# Patient Record
Sex: MALE | Race: WHITE | NOT HISPANIC OR LATINO | Employment: OTHER | URBAN - METROPOLITAN AREA
[De-identification: names, ages, dates, MRNs, and addresses within clinical notes are randomized per-mention and may not be internally consistent; named-entity substitution may affect disease eponyms.]

---

## 2017-01-25 ENCOUNTER — TELEPHONE (OUTPATIENT)
Dept: UROLOGY | Facility: CLINIC | Age: 67
End: 2017-01-25

## 2017-01-30 ENCOUNTER — OFFICE VISIT (OUTPATIENT)
Dept: UROLOGY | Facility: CLINIC | Age: 67
End: 2017-01-30
Payer: MEDICARE

## 2017-01-30 VITALS
BODY MASS INDEX: 40.73 KG/M2 | DIASTOLIC BLOOD PRESSURE: 77 MMHG | WEIGHT: 275 LBS | SYSTOLIC BLOOD PRESSURE: 140 MMHG | HEIGHT: 69 IN | HEART RATE: 61 BPM | TEMPERATURE: 98 F

## 2017-01-30 DIAGNOSIS — R35.81 NOCTURNAL POLYURIA: Primary | ICD-10-CM

## 2017-01-30 PROCEDURE — 81002 URINALYSIS NONAUTO W/O SCOPE: CPT | Mod: PBBFAC,PO | Performed by: UROLOGY

## 2017-01-30 PROCEDURE — 99213 OFFICE O/P EST LOW 20 MIN: CPT | Mod: S$PBB,,, | Performed by: UROLOGY

## 2017-01-30 PROCEDURE — 99999 PR PBB SHADOW E&M-EST. PATIENT-LVL III: CPT | Mod: PBBFAC,,, | Performed by: UROLOGY

## 2017-01-30 PROCEDURE — 99213 OFFICE O/P EST LOW 20 MIN: CPT | Mod: PBBFAC,PO | Performed by: UROLOGY

## 2017-01-30 RX ORDER — CARVEDILOL 12.5 MG/1
12.5 TABLET ORAL 2 TIMES DAILY
COMMUNITY
End: 2018-09-24 | Stop reason: SDUPTHER

## 2017-01-30 RX ORDER — FUROSEMIDE 40 MG/1
40 TABLET ORAL 2 TIMES DAILY
COMMUNITY
End: 2018-06-04

## 2017-01-30 RX ORDER — AMLODIPINE BESYLATE 5 MG/1
5 TABLET ORAL DAILY
COMMUNITY
End: 2018-06-04 | Stop reason: SDUPTHER

## 2017-01-30 RX ORDER — DUTASTERIDE 0.5 MG/1
0.5 CAPSULE, LIQUID FILLED ORAL NIGHTLY
COMMUNITY
End: 2017-01-30 | Stop reason: ALTCHOICE

## 2017-01-30 NOTE — MR AVS SNAPSHOT
[facility] [place] - Urology   [address]  [place] [address]  Phone: [phone]                  Srinivasa Tovarjaden   2017 3:00 PM   Office Visit    Description:  Male : 1950   Provider:  Lupillo Orlando MD   Department:  [facility] [place] - Urology           Reason for Visit     Annual Exam     Nocturia                To Do List           Goals (5 Years of Data)     None      [facility] On [facility]     [facility] On Call Nurse Care Line -  Assistance  Registered nurses in the [facility] On Call Center provide clinical advisement, health education, appointment booking, and other advisory services.  Call for this free service at [phone].             Medications           Message regarding Medications     Verify the changes and/or additions to your medication regime listed below are the same as discussed with your clinician today.  If any of these changes or additions are incorrect, please notify your healthcare provider.        STOP taking these medications     atenolol (TENORMIN) 25 MG tablet Take 25 mg by mouth 2 (two) times daily.     azelastine (ASTELIN) 137 mcg nasal spray 1 spray by Nasal route 2 (two) times daily as needed for Rhinitis.    hydrochlorothiazide (HYDRODIURIL) 25 MG tablet Take 25 mg by mouth once daily.     NAPROXEN SODIUM (ALEVE ORAL) Take 1 tablet by mouth daily as needed.           Verify that the below list of medications is an accurate representation of the medications you are currently taking.  If none reported, the list may be blank. If incorrect, please contact your healthcare provider. Carry this list with you in case of emergency.           Current Medications     amlodipine (NORVASC) 5 MG tablet Take 5 mg by mouth once daily.    aspirin (ECOTRIN) 81 MG EC tablet Take 81 mg by mouth once daily.    carvedilol (COREG) 12.5 MG tablet Take 12.5 mg by mouth 2 (two) times daily.    clemastine (TAVIST) 2.68 mg Tab 2.68 mg 2 (two) times daily.     cloNIDine 0.3 mg/24  "hr td ptwk (CATAPRES) 0.3 mg/24 hr Place 1 patch onto the skin every 7 days.     dutasteride (AVODART) 0.5 mg capsule Take 0.5 mg by mouth every evening.    finasteride (PROSCAR) 5 mg tablet TAKE 1 TABLET BY MOUTH ONCE DAILY    furosemide (LASIX) 40 MG tablet Take 40 mg by mouth 2 (two) times daily.    hydrALAZINE (APRESOLINE) 50 MG tablet Take 25 mg by mouth 4 (four) times daily.     losartan (COZAAR) 50 MG tablet Take 50 mg by mouth 2 (two) times daily.     potassium bicarbonate (KLOR-CON/EF) disintegrating tablet Take 25 mEq by mouth once daily.           Clinical Reference Information           Vital Signs - Last Recorded  Most recent update: 1/30/2017  2:57 PM by Chacha Menchaca MA    BP Pulse Temp Ht Wt BMI    (!) 140/77 (BP Location: Left arm, Patient Position: Sitting, BP Method: Automatic) 61 98.1 °F (36.7 °C) (Oral) 5' 9" (1.753 m) 124.7 kg (275 lb) 40.61 kg/m2      Blood Pressure          Most Recent Value    BP  (!)  140/77      Allergies as of 1/30/2017     No Known Allergies      Immunizations Administered on Date of Encounter - 1/30/2017     None      "

## 2017-01-30 NOTE — PROGRESS NOTES
OFFICE NOTE    CHIEF COMPLAINT:  Nocturnal polyuria.    Mr. 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    The patient referred that he is urinating with a good flow, good control, has a   good steady flow and he feels that he empties the bladder satisfactory.  Despite   of this, the patient still has urinary frequency and nocturia every 30 to 60   minutes and every time that he urinates, he urinates approximately 2 to 300 mL.    This has been going on for years and he refers that the previous procedures   have not helped him in the amount of urine that he urinates.    Today, we measured the postvoid residual urine and this was found to be at 11   mL.    My impression is that this patient is having nocturnal polyuria most likely   diabetes insipidus and I suggest that we obtained a consultation with   Endocrinology to determine if that is the case.  The specific gravity of Mr. White today is about 1005.  I would like to obtain an opinion of an   endocrinologist to see if this is secondary to an antidiuretic hormone   dysfunction or not.  He agreed to proceed with that.  I suggest him that he   should start a diary with the date, time and amount urinated.  In that way, when   he consults the endocrinologists, he will have all the information handy.  From   the urological point of view, I think we do not have anything else to offer to   Mr. White and I would like to see what the endocrinologists have to say about   his condition    I spent approximately 40 minutes with Mr. White today and all the time was   spent on counseling and he left the office in satisfactory condition.      EOR/PN  dd: 01/30/2017 17:14:25 (CST)  td: 01/31/2017 02:10:01 (CST)  Doc ID   #[id number]  Job ID #[id number]    CC:

## 2017-01-31 LAB
BILIRUB SERPL-MCNC: ABNORMAL MG/DL
BLOOD URINE, POC: ABNORMAL
COLOR, POC UA: ABNORMAL
GLUCOSE UR QL STRIP: ABNORMAL
KETONES UR QL STRIP: ABNORMAL
LEUKOCYTE ESTERASE URINE, POC: ABNORMAL
NITRITE, POC UA: ABNORMAL
PH, POC UA: 6
PROTEIN, POC: ABNORMAL
SPECIFIC GRAVITY, POC UA: 1
UROBILINOGEN, POC UA: ABNORMAL

## 2017-02-03 ENCOUNTER — TELEPHONE (OUTPATIENT)
Dept: ENDOCRINOLOGY | Facility: CLINIC | Age: 67
End: 2017-02-03

## 2017-02-03 NOTE — TELEPHONE ENCOUNTER
----- Message from Daryn Chanel sent at 2/2/2017  3:32 PM CST -----  Contact: pt  Pt is calling for an appt to est care and get a check up  Call Back#[phone]  Thanks

## 2017-02-03 NOTE — TELEPHONE ENCOUNTER
----- Message from Daryn Chanel sent at 2/2/2017  3:32 PM CST -----  Contact: pt  Pt is calling for an appt to est care and get a check up  Call Back#[phone]  Thanks

## 2018-01-05 ENCOUNTER — LAB VISIT (OUTPATIENT)
Dept: LAB | Facility: HOSPITAL | Age: 68
End: 2018-01-05
Attending: INTERNAL MEDICINE
Payer: MEDICARE

## 2018-01-05 ENCOUNTER — OFFICE VISIT (OUTPATIENT)
Dept: ENDOCRINOLOGY | Facility: CLINIC | Age: 68
End: 2018-01-05
Payer: MEDICARE

## 2018-01-05 VITALS
DIASTOLIC BLOOD PRESSURE: 69 MMHG | HEIGHT: 69 IN | BODY MASS INDEX: 38.69 KG/M2 | WEIGHT: 261.25 LBS | RESPIRATION RATE: 18 BRPM | SYSTOLIC BLOOD PRESSURE: 128 MMHG | HEART RATE: 63 BPM

## 2018-01-05 DIAGNOSIS — N40.1 BPH WITH OBSTRUCTION/LOWER URINARY TRACT SYMPTOMS: ICD-10-CM

## 2018-01-05 DIAGNOSIS — E66.9 OBESITY, UNSPECIFIED CLASSIFICATION, UNSPECIFIED OBESITY TYPE, UNSPECIFIED WHETHER SERIOUS COMORBIDITY PRESENT: ICD-10-CM

## 2018-01-05 DIAGNOSIS — R35.89 FREQUENCY OF URINATION AND POLYURIA: ICD-10-CM

## 2018-01-05 DIAGNOSIS — E88.810 DYSMETABOLIC SYNDROME: ICD-10-CM

## 2018-01-05 DIAGNOSIS — N20.0 NEPHROLITHIASIS: ICD-10-CM

## 2018-01-05 DIAGNOSIS — R35.0 FREQUENCY OF URINATION AND POLYURIA: Primary | ICD-10-CM

## 2018-01-05 DIAGNOSIS — E55.9 HYPOVITAMINOSIS D: ICD-10-CM

## 2018-01-05 DIAGNOSIS — R35.0 FREQUENCY OF URINATION AND POLYURIA: ICD-10-CM

## 2018-01-05 DIAGNOSIS — I10 ESSENTIAL HYPERTENSION: ICD-10-CM

## 2018-01-05 DIAGNOSIS — R73.9 HYPERGLYCEMIA: ICD-10-CM

## 2018-01-05 DIAGNOSIS — Z72.820 SLEEP DEPRIVATION: ICD-10-CM

## 2018-01-05 DIAGNOSIS — N13.8 BPH WITH OBSTRUCTION/LOWER URINARY TRACT SYMPTOMS: ICD-10-CM

## 2018-01-05 DIAGNOSIS — R35.89 FREQUENCY OF URINATION AND POLYURIA: Primary | ICD-10-CM

## 2018-01-05 LAB
25(OH)D3+25(OH)D2 SERPL-MCNC: 22 NG/ML
ALBUMIN SERPL BCP-MCNC: 3.5 G/DL
ALP SERPL-CCNC: 70 U/L
ALT SERPL W/O P-5'-P-CCNC: 20 U/L
ANION GAP SERPL CALC-SCNC: 8 MMOL/L
AST SERPL-CCNC: 17 U/L
BILIRUB SERPL-MCNC: 0.6 MG/DL
BNP SERPL-MCNC: 76 PG/ML
BUN SERPL-MCNC: 9 MG/DL
CA-I BLDV-SCNC: 1.2 MMOL/L
CALCIUM SERPL-MCNC: 9.3 MG/DL
CHLORIDE SERPL-SCNC: 101 MMOL/L
CO2 SERPL-SCNC: 34 MMOL/L
CREAT SERPL-MCNC: 1 MG/DL
EST. GFR  (AFRICAN AMERICAN): >60 ML/MIN/1.73 M^2
EST. GFR  (NON AFRICAN AMERICAN): >60 ML/MIN/1.73 M^2
ESTIMATED AVG GLUCOSE: 114 MG/DL
GLUCOSE SERPL-MCNC: 147 MG/DL
HBA1C MFR BLD HPLC: 5.6 %
MAGNESIUM SERPL-MCNC: 1.9 MG/DL
OSMOLALITY SERPL: 303 MOSM/KG
PHOSPHATE SERPL-MCNC: 2.6 MG/DL
POTASSIUM SERPL-SCNC: 3.3 MMOL/L
PROT SERPL-MCNC: 7.3 G/DL
PTH-INTACT SERPL-MCNC: 52 PG/ML
SODIUM SERPL-SCNC: 143 MMOL/L
URATE SERPL-MCNC: 5.4 MG/DL

## 2018-01-05 PROCEDURE — 83930 ASSAY OF BLOOD OSMOLALITY: CPT

## 2018-01-05 PROCEDURE — 99213 OFFICE O/P EST LOW 20 MIN: CPT | Mod: PBBFAC,PO | Performed by: INTERNAL MEDICINE

## 2018-01-05 PROCEDURE — 82330 ASSAY OF CALCIUM: CPT

## 2018-01-05 PROCEDURE — 83036 HEMOGLOBIN GLYCOSYLATED A1C: CPT

## 2018-01-05 PROCEDURE — 99999 PR PBB SHADOW E&M-EST. PATIENT-LVL III: CPT | Mod: PBBFAC,,, | Performed by: INTERNAL MEDICINE

## 2018-01-05 PROCEDURE — 84588 ASSAY OF VASOPRESSIN: CPT

## 2018-01-05 PROCEDURE — 99204 OFFICE O/P NEW MOD 45 MIN: CPT | Mod: S$PBB,,, | Performed by: INTERNAL MEDICINE

## 2018-01-05 PROCEDURE — 84100 ASSAY OF PHOSPHORUS: CPT

## 2018-01-05 PROCEDURE — 80053 COMPREHEN METABOLIC PANEL: CPT

## 2018-01-05 PROCEDURE — 82306 VITAMIN D 25 HYDROXY: CPT

## 2018-01-05 PROCEDURE — 83880 ASSAY OF NATRIURETIC PEPTIDE: CPT

## 2018-01-05 PROCEDURE — 84550 ASSAY OF BLOOD/URIC ACID: CPT

## 2018-01-05 PROCEDURE — 82088 ASSAY OF ALDOSTERONE: CPT

## 2018-01-05 PROCEDURE — 36415 COLL VENOUS BLD VENIPUNCTURE: CPT | Mod: PO

## 2018-01-05 PROCEDURE — 83970 ASSAY OF PARATHORMONE: CPT

## 2018-01-05 PROCEDURE — 83735 ASSAY OF MAGNESIUM: CPT

## 2018-01-05 RX ORDER — MULTIVIT WITH MINERALS/HERBS
1 TABLET ORAL DAILY
COMMUNITY
End: 2018-06-04

## 2018-01-05 RX ORDER — NYSTATIN 100000 [USP'U]/ML
SUSPENSION ORAL
COMMUNITY
Start: 2018-01-04 | End: 2018-06-04

## 2018-01-05 NOTE — PROGRESS NOTES
Subjective:      Patient ID: Srinivasa White is a 67 y.o. male.    Chief Complaint:      67 yr old gentleman seen for initial care visit on account of polyuria    History of Present Illness    Patient is a 67 yr old gentleman seen for initial care visit today on account of polyuria. He does have a background history of stable nephrolithiasis which was non obstructive at his last imagine (USS of retroperitoneum from 07/15) and essential hypertension. He has been having nocturia and daytime frequency. He also has BPH which has been managed with prostatectomy with marked improvement in urinary stream and flow. Patient is however on high dose lasix 40mg BID!!! This was started initially as an antihypertensive ~ 10 yrs ago.  Patient baseline [place] score is 13. Patient has never had sleep study.  Patient indicates that he has nocturia; apparently wakes to urinate ~ every hour..  Patients nephrologist is Dr Rickey Gregg for reasons that are unclear.  He denies any prior history of head injury and he indicates that with the recent change in the timing and total dose of his lasix, his nocturia has significantly reduced as has his total daily urinary output. In addition, with the use of compressive stockings his frequency and volume of urination has also diminshed           Review of Systems   Constitutional: Positive for fatigue (mild and closely related to how much quality sleep he gets.). Negative for activity change, diaphoresis, fever and unexpected weight change.   HENT: Negative for facial swelling, sore throat, trouble swallowing and voice change.    Eyes: Negative for photophobia and visual disturbance.   Respiratory: Negative for cough, shortness of breath and wheezing.    Cardiovascular: Negative for chest pain, palpitations and leg swelling.   Gastrointestinal: Negative for abdominal distention, abdominal pain, constipation, diarrhea, nausea and vomiting.   Endocrine: Positive for polyuria (Chronic). Negative  "for heat intolerance, polydipsia and polyphagia.   Genitourinary: Positive for frequency (reducing since change to lasix dose). Negative for difficulty urinating, dysuria, flank pain, hematuria and urgency.   Musculoskeletal: Negative for arthralgias, back pain, gait problem, joint swelling, myalgias and neck pain.   Skin: Negative for color change, pallor and rash.   Neurological: Negative for dizziness, tremors, seizures, syncope, light-headedness, numbness and headaches.   Hematological: Does not bruise/bleed easily.   Psychiatric/Behavioral: Positive for sleep disturbance (Snoring ++ fragmented sleep due to nocturia and frequency.). Negative for agitation, confusion and dysphoric mood. The patient is not nervous/anxious and is not hyperactive.        Objective: /69   Pulse 63   Resp 18   Ht 5' 9" (1.753 m)   Wt 118.5 kg (261 lb 3.9 oz)   BMI 38.58 kg/m²        Physical Exam   Constitutional: He is oriented to person, place, and time. Vital signs are normal. He appears well-developed and well-nourished.  Non-toxic appearance. No distress.   Pleasant elderly gentleman. Not pale, anicteric and afebrile. Mildly dehydrated. Not in any acute distress.   HENT:   Head: Normocephalic and atraumatic. Head is without right periorbital erythema and without left periorbital erythema. Hair is normal.   Mouth/Throat: No oropharyngeal exudate.   Mallampati grade 3 with very narrow fauces and tonsils close to abutting in the mid line. No nuchal AN.   Eyes: Conjunctivae, EOM and lids are normal. Pupils are equal, round, and reactive to light. No scleral icterus.   Neck: Trachea normal, normal range of motion, full passive range of motion without pain and phonation normal. Neck supple. No JVD present. No tracheal tenderness present. Carotid bruit is not present. No tracheal deviation present. No thyroid mass and no thyromegaly present.   Cardiovascular: Normal rate, regular rhythm, normal heart sounds and normal pulses. "  Exam reveals no gallop.    No murmur heard.  Pulmonary/Chest: Effort normal and breath sounds normal. No respiratory distress. He has no decreased breath sounds. He has no wheezes. He has no rales.   Abdominal: Soft. Bowel sounds are normal. He exhibits no distension and no mass. There is no hepatosplenomegaly. There is no tenderness.   Obese anterior abdominal wall.   Musculoskeletal: Normal range of motion. He exhibits no edema or tenderness.   No pedal edema but has compression stockings on.   Lymphadenopathy:     He has no cervical adenopathy.   Neurological: He is alert and oriented to person, place, and time. He has normal strength and normal reflexes. He displays no tremor and normal reflexes. No cranial nerve deficit or sensory deficit. He exhibits normal muscle tone. Gait normal.   Skin: Skin is warm, dry and intact. No bruising, no ecchymosis, no petechiae and no rash noted. He is not diaphoretic. No cyanosis or erythema. Nails show no clubbing.   Has a few facial and neck skin tags.   Psychiatric: He has a normal mood and affect. His speech is normal and behavior is normal. Judgment and thought content normal. His mood appears not anxious. Cognition and memory are normal. He does not exhibit a depressed mood.   Vitals reviewed.      Lab Review:     No recent labs for review in the [facility] data repository.    Assessment:     1. Frequency of urination and polyuria  Calcium, ionized    Comprehensive metabolic panel    Calcium, urine, random    Phosphorus, urine, random    Chloride, urine, timed    Sodium, urine, random    Sodium, urine, timed    Chloride, urine, random    Brain natriuretic peptide    Arginine vasopressin hormone    Osmolality    Osmolality, urine   2. BPH with obstruction/lower urinary tract symptoms  Chloride, urine, timed    Sodium, urine, timed    Calcium, Timed Urine    Phosphorus, urine, random    Creatinine, urine, timed   3. Essential hypertension  Hemoglobin A1c    Comprehensive  metabolic panel    Vitamin D    PTH, intact    Aldosterone    Renin    Magnesium    Phosphorus    Microalbumin/creatinine urine ratio    Urinalysis   4. Obesity, unspecified classification, unspecified obesity type, unspecified whether serious comorbidity present  Hemoglobin A1c    Vitamin D   5. Dysmetabolic syndrome  Hemoglobin A1c   6. Nephrolithiasis  Uric acid   7. Hypovitaminosis D  Vitamin D   8. Hyperglycemia  Hemoglobin A1c     Regarding syndrome of polyuria; this appears to be driven by long term high dose lasix use which may now be associated with a degree of diuretic nephropathy or nephrogenic DI. Patients clinical presentation is no consistent with central DI.  To obtain labs including 24 hr urine as noted above while on lasix and then to stop lasix and ~ 2 weeks later to repeat 24 hr urine profile. Will compare the volume and electrolyte balance on and off lasix which appears to be the etiologic factor here.  Regarding hypertension; BP well controlled at the moment. No change to present antihypertensives. Patient to continue serial tracking of ambulatory BP trends and if while of lasix this is noticed to increase may then add on an additional antihypertensive. For now to continue rest of anthypertensives as before.  Regarding BPH; ongoing management as per Urology service.  Regarding sleep deprivation; patient will need to obtain sleep study to exclude possible coexisting OSAS.  Regarding Obesity and possible dysmetabolic syndrome; to obtain screening HBA1c to exclude any associated dysglycemia.      Plan:       FFup in ~ 3mths

## 2018-01-06 DIAGNOSIS — E87.6 HYPOKALEMIA: Primary | ICD-10-CM

## 2018-01-06 PROBLEM — E55.9 HYPOVITAMINOSIS D: Status: ACTIVE | Noted: 2018-01-06

## 2018-01-08 LAB — ALDOST SERPL-MCNC: 30.3 NG/DL

## 2018-01-10 ENCOUNTER — LAB VISIT (OUTPATIENT)
Dept: LAB | Facility: HOSPITAL | Age: 68
End: 2018-01-10
Attending: INTERNAL MEDICINE
Payer: MEDICARE

## 2018-01-10 ENCOUNTER — DOCUMENTATION ONLY (OUTPATIENT)
Dept: ENDOCRINOLOGY | Facility: CLINIC | Age: 68
End: 2018-01-10

## 2018-01-10 DIAGNOSIS — R35.0 FREQUENCY OF URINATION AND POLYURIA: ICD-10-CM

## 2018-01-10 DIAGNOSIS — R35.89 FREQUENCY OF URINATION AND POLYURIA: ICD-10-CM

## 2018-01-10 PROBLEM — E26.9 HYPERALDOSTERONISM: Status: ACTIVE | Noted: 2018-01-10

## 2018-01-10 LAB — RENIN PLAS-CCNC: 0.8 NG/ML/H

## 2018-01-10 PROCEDURE — 84300 ASSAY OF URINE SODIUM: CPT

## 2018-01-10 PROCEDURE — 82436 ASSAY OF URINE CHLORIDE: CPT

## 2018-01-10 NOTE — PROGRESS NOTES
Patients initial review shows biochemical features suggestive of primary hyperaldosteronism though his current BP was normal (on antihypertensives). We will plan to repeat this at his follow visit and if the features remain the same I will then arrange for him to have a salt suppression test.

## 2018-01-11 LAB
CHLORIDE 24H UR-SRATE: 8 MMOL/HR
CHLORIDE UR-SCNC: 45 MMOL/L
CHLORIDE, TIMED UR (MMOL/SPEC): 194 MMOL/SPEC
SODIUM 24H UR-SRATE: 9.5 MMOL/HR
SODIUM UR-SCNC: 53 MMOL/L
SODIUM URINE (MMOL/SPEC): 228 MMOL/SPEC
URINE COLLECTION DURATION: 24 HR
URINE COLLECTION DURATION: 24 HR
URINE VOLUME: 4300 ML
URINE VOLUME: 4300 ML
VASOPRESSIN SERPL-MCNC: <0.5 PG/ML (ref 0–6.9)

## 2018-01-25 ENCOUNTER — LAB VISIT (OUTPATIENT)
Dept: LAB | Facility: HOSPITAL | Age: 68
End: 2018-01-25
Attending: INTERNAL MEDICINE
Payer: MEDICARE

## 2018-01-25 DIAGNOSIS — N40.1 BPH WITH OBSTRUCTION/LOWER URINARY TRACT SYMPTOMS: ICD-10-CM

## 2018-01-25 DIAGNOSIS — N13.8 BPH WITH OBSTRUCTION/LOWER URINARY TRACT SYMPTOMS: ICD-10-CM

## 2018-01-25 PROCEDURE — 84300 ASSAY OF URINE SODIUM: CPT

## 2018-01-25 PROCEDURE — 82340 ASSAY OF CALCIUM IN URINE: CPT

## 2018-01-25 PROCEDURE — 82570 ASSAY OF URINE CREATININE: CPT

## 2018-01-25 PROCEDURE — 82436 ASSAY OF URINE CHLORIDE: CPT

## 2018-01-26 LAB
CALCIUM 24H UR-MRATE: 14 MG/HR
CALCIUM UR-MCNC: 11.5 MG/DL
CALCIUM URINE (MG/SPEC): 345 MG/SPEC
CHLORIDE 24H UR-SRATE: 4 MMOL/HR
CHLORIDE UR-SCNC: 31 MMOL/L
CHLORIDE, TIMED UR (MMOL/SPEC): 93 MMOL/SPEC
CREAT 24H UR-MRATE: 75 MG/HR
CREAT UR-MCNC: 60 MG/DL
CREATININE, URINE (MG/SPEC): 1800 MG/SPEC
SODIUM 24H UR-SRATE: 4.6 MMOL/HR
SODIUM UR-SCNC: 37 MMOL/L
SODIUM URINE (MMOL/SPEC): 111 MMOL/SPEC
URINE COLLECTION DURATION: 24 HR
URINE VOLUME: 3000 ML

## 2018-02-19 DIAGNOSIS — E87.6 HYPOKALEMIA: ICD-10-CM

## 2018-02-19 DIAGNOSIS — N18.2 CHRONIC KIDNEY DISEASE, STAGE II (MILD): Primary | ICD-10-CM

## 2018-02-19 DIAGNOSIS — R80.9 PROTEINURIA: ICD-10-CM

## 2018-02-19 DIAGNOSIS — I12.9 HYPERTENSION, RENAL DISEASE, STAGE 1-4 OR UNSPECIFIED CHRONIC KIDNEY DISEASE: ICD-10-CM

## 2018-02-19 DIAGNOSIS — E55.9 VITAMIN D DEFICIENCY: ICD-10-CM

## 2018-02-22 ENCOUNTER — HOSPITAL ENCOUNTER (OUTPATIENT)
Dept: RADIOLOGY | Facility: HOSPITAL | Age: 68
Discharge: HOME | End: 2018-02-22
Attending: INTERNAL MEDICINE
Payer: MEDICARE

## 2018-02-22 DIAGNOSIS — E87.6 HYPOKALEMIA: ICD-10-CM

## 2018-02-22 DIAGNOSIS — N18.2 CHRONIC KIDNEY DISEASE, STAGE II (MILD): ICD-10-CM

## 2018-02-22 DIAGNOSIS — I12.9 HYPERTENSION, RENAL DISEASE, STAGE 1-4 OR UNSPECIFIED CHRONIC KIDNEY DISEASE: ICD-10-CM

## 2018-02-22 DIAGNOSIS — E55.9 VITAMIN D DEFICIENCY: ICD-10-CM

## 2018-02-22 DIAGNOSIS — R80.9 PROTEINURIA: ICD-10-CM

## 2018-02-22 PROCEDURE — 70450 CT HEAD/BRAIN W/O DYE: CPT | Mod: 26,,, | Performed by: RADIOLOGY

## 2018-02-22 PROCEDURE — 70450 CT HEAD/BRAIN W/O DYE: CPT | Mod: TC

## 2018-02-23 ENCOUNTER — DOCUMENTATION ONLY (OUTPATIENT)
Dept: ENDOCRINOLOGY | Facility: CLINIC | Age: 68
End: 2018-02-23

## 2018-02-23 NOTE — PROGRESS NOTES
I have reviewed the ambulatory BP records that the patient sent in for review for the period of 11/17 till date and the mean BP and [place] profile is at the desired goals so no further dose adjustment to his present antihypertensive regimen is needed at the moment.

## 2018-02-26 ENCOUNTER — TELEPHONE (OUTPATIENT)
Dept: ENDOCRINOLOGY | Facility: CLINIC | Age: 68
End: 2018-02-26

## 2018-02-26 NOTE — TELEPHONE ENCOUNTER
----- Message from David Banks MD sent at 2/23/2018 11:00 AM CST -----  Regarding: ambulatory BP records  Kindly inform Mr White that I have reviewed his recent BP records that he sent in. Thank him for his diligence in doing the home BP checks consistently and also keeping written records of the results. Upon review the current profile is essentially at the desired target goals and so no dose changes to his present antihypertensives is indicated at this time.    David Banks MD

## 2018-06-04 ENCOUNTER — OFFICE VISIT (OUTPATIENT)
Dept: ENDOCRINOLOGY | Facility: CLINIC | Age: 68
End: 2018-06-04
Payer: MEDICARE

## 2018-06-04 ENCOUNTER — LAB VISIT (OUTPATIENT)
Dept: LAB | Facility: HOSPITAL | Age: 68
End: 2018-06-04
Attending: INTERNAL MEDICINE
Payer: MEDICARE

## 2018-06-04 VITALS
HEIGHT: 69 IN | WEIGHT: 265.63 LBS | DIASTOLIC BLOOD PRESSURE: 73 MMHG | RESPIRATION RATE: 18 BRPM | SYSTOLIC BLOOD PRESSURE: 149 MMHG | HEART RATE: 53 BPM | BODY MASS INDEX: 39.34 KG/M2

## 2018-06-04 DIAGNOSIS — Z72.820 SLEEP DEPRIVATION: ICD-10-CM

## 2018-06-04 DIAGNOSIS — N13.8 BPH WITH OBSTRUCTION/LOWER URINARY TRACT SYMPTOMS: ICD-10-CM

## 2018-06-04 DIAGNOSIS — E29.1 HYPOGONADISM IN MALE: ICD-10-CM

## 2018-06-04 DIAGNOSIS — N40.0 BENIGN PROSTATIC HYPERPLASIA, UNSPECIFIED WHETHER LOWER URINARY TRACT SYMPTOMS PRESENT: ICD-10-CM

## 2018-06-04 DIAGNOSIS — E55.9 HYPOVITAMINOSIS D: ICD-10-CM

## 2018-06-04 DIAGNOSIS — I10 ESSENTIAL HYPERTENSION: ICD-10-CM

## 2018-06-04 DIAGNOSIS — E66.9 OBESITY, UNSPECIFIED CLASSIFICATION, UNSPECIFIED OBESITY TYPE, UNSPECIFIED WHETHER SERIOUS COMORBIDITY PRESENT: ICD-10-CM

## 2018-06-04 DIAGNOSIS — N20.0 NEPHROLITHIASIS: ICD-10-CM

## 2018-06-04 DIAGNOSIS — N40.1 BPH WITH OBSTRUCTION/LOWER URINARY TRACT SYMPTOMS: ICD-10-CM

## 2018-06-04 DIAGNOSIS — E26.9 HYPERALDOSTERONISM: Primary | ICD-10-CM

## 2018-06-04 DIAGNOSIS — E26.9 HYPERALDOSTERONISM: ICD-10-CM

## 2018-06-04 DIAGNOSIS — E88.810 DYSMETABOLIC SYNDROME: ICD-10-CM

## 2018-06-04 LAB
25(OH)D3+25(OH)D2 SERPL-MCNC: 39 NG/ML
ALBUMIN SERPL BCP-MCNC: 3.8 G/DL
ALP SERPL-CCNC: 70 U/L
ALT SERPL W/O P-5'-P-CCNC: 17 U/L
ANION GAP SERPL CALC-SCNC: 8 MMOL/L
AST SERPL-CCNC: 16 U/L
BILIRUB SERPL-MCNC: 0.5 MG/DL
BUN SERPL-MCNC: 12 MG/DL
CA-I BLDV-SCNC: 1.23 MMOL/L
CALCIUM SERPL-MCNC: 9.4 MG/DL
CHLORIDE SERPL-SCNC: 103 MMOL/L
CO2 SERPL-SCNC: 30 MMOL/L
CREAT SERPL-MCNC: 0.9 MG/DL
DHEA-S SERPL-MCNC: 72.6 UG/DL
EST. GFR  (AFRICAN AMERICAN): >60 ML/MIN/1.73 M^2
EST. GFR  (NON AFRICAN AMERICAN): >60 ML/MIN/1.73 M^2
FSH SERPL-ACNC: 8.1 MIU/ML
GLUCOSE SERPL-MCNC: 84 MG/DL
LH SERPL-ACNC: 3.1 MIU/ML
MAGNESIUM SERPL-MCNC: 2 MG/DL
PHOSPHATE SERPL-MCNC: 3.1 MG/DL
POTASSIUM SERPL-SCNC: 3 MMOL/L
PROT SERPL-MCNC: 7.3 G/DL
SODIUM SERPL-SCNC: 141 MMOL/L

## 2018-06-04 PROCEDURE — 99999 PR PBB SHADOW E&M-EST. PATIENT-LVL III: CPT | Mod: PBBFAC,,, | Performed by: INTERNAL MEDICINE

## 2018-06-04 PROCEDURE — 82626 DEHYDROEPIANDROSTERONE: CPT

## 2018-06-04 PROCEDURE — 82672 ASSAY OF ESTROGEN: CPT

## 2018-06-04 PROCEDURE — 82040 ASSAY OF SERUM ALBUMIN: CPT

## 2018-06-04 PROCEDURE — 84300 ASSAY OF URINE SODIUM: CPT

## 2018-06-04 PROCEDURE — 82627 DEHYDROEPIANDROSTERONE: CPT

## 2018-06-04 PROCEDURE — 82088 ASSAY OF ALDOSTERONE: CPT | Mod: 91

## 2018-06-04 PROCEDURE — 82088 ASSAY OF ALDOSTERONE: CPT

## 2018-06-04 PROCEDURE — 36415 COLL VENOUS BLD VENIPUNCTURE: CPT | Mod: PO

## 2018-06-04 PROCEDURE — 80053 COMPREHEN METABOLIC PANEL: CPT

## 2018-06-04 PROCEDURE — 99213 OFFICE O/P EST LOW 20 MIN: CPT | Mod: PBBFAC,PO | Performed by: INTERNAL MEDICINE

## 2018-06-04 PROCEDURE — 84154 ASSAY OF PSA FREE: CPT

## 2018-06-04 PROCEDURE — 84403 ASSAY OF TOTAL TESTOSTERONE: CPT

## 2018-06-04 PROCEDURE — 99214 OFFICE O/P EST MOD 30 MIN: CPT | Mod: S$PBB,,, | Performed by: INTERNAL MEDICINE

## 2018-06-04 PROCEDURE — 83002 ASSAY OF GONADOTROPIN (LH): CPT

## 2018-06-04 PROCEDURE — 82330 ASSAY OF CALCIUM: CPT

## 2018-06-04 PROCEDURE — 84100 ASSAY OF PHOSPHORUS: CPT

## 2018-06-04 PROCEDURE — 82306 VITAMIN D 25 HYDROXY: CPT

## 2018-06-04 PROCEDURE — 83970 ASSAY OF PARATHORMONE: CPT

## 2018-06-04 PROCEDURE — 83735 ASSAY OF MAGNESIUM: CPT

## 2018-06-04 PROCEDURE — 83001 ASSAY OF GONADOTROPIN (FSH): CPT

## 2018-06-04 PROCEDURE — 82570 ASSAY OF URINE CREATININE: CPT

## 2018-06-04 RX ORDER — AZELASTINE 1 MG/ML
1 SPRAY, METERED NASAL 2 TIMES DAILY
COMMUNITY
End: 2022-01-10 | Stop reason: ALTCHOICE

## 2018-06-04 RX ORDER — AMLODIPINE BESYLATE 5 MG/1
10 TABLET ORAL DAILY
Qty: 90 TABLET | Refills: 3 | Status: SHIPPED | OUTPATIENT
Start: 2018-06-04 | End: 2018-09-20 | Stop reason: SDUPTHER

## 2018-06-04 RX ORDER — BENZONATATE 100 MG/1
CAPSULE ORAL
COMMUNITY
End: 2022-01-23

## 2018-06-04 RX ORDER — GLUCOSAMINE/CHONDRO SU A 500-400 MG
1 TABLET ORAL 2 TIMES DAILY
COMMUNITY

## 2018-06-04 NOTE — PROGRESS NOTES
Subjective:      Patient ID: Srinivasa White is a 67 y.o. male.    Chief Complaint:      67 yr old gentleman seen in [facility] today on accounf of polyuria and biochemical features suggestive of hyperaldosteronism.    History of Present Illness    Patient is a 67 yr old gentleman seen in [facility] visit today on account of polyuria. He does have a background history of stable nephrolithiasis which was non obstructive at his last imagine (USS of retroperitoneum from 07/15) and essential hypertension. He has been having nocturia and daytime frequency. He also has BPH which has been managed with prostatectomy with marked improvement in urinary stream and flow. Patient is however on high dose lasix 40mg BID!!! This was started initially as an antihypertensive ~ 10 yrs ago.  Patient since stopping the lasix has continued to have have polyuria though the overall volume has reduced signiifcantly    Patient baseline Erieville score is 13. Patient has never had sleep study.  Patient indicates that he has nocturia; apparently wakes to urinate ~ every hour..  Patients nephrologist is Dr Rickey Gregg.  He denies any prior history of head injury and he indicates that with the recent change in the timing and total dose of his lasix, his nocturia has significantly reduced as has his total daily urinary output. In addition, with the use of compressive stockings his frequency and volume of urination has also diminshed     Patients initial lab review shows biochemical features suggestive of primary hyperaldosteronism though his current BP was normal (on antihypertensives).  Review of additional outside labs from 02/18 showed extensive rheumatologic screening labs which were all normal other than basic URSZULA screen. Testosterone levels from 03/17 were also reduced (170)    Review of Systems   Constitutional: Positive for fatigue (mild and closely related to how much quality sleep he gets.). Negative for activity change, diaphoresis, fever and  "unexpected weight change.   HENT: Negative for facial swelling, sore throat, trouble swallowing and voice change.    Eyes: Negative for photophobia and visual disturbance.   Respiratory: Negative for cough, shortness of breath and wheezing.    Cardiovascular: Negative for chest pain, palpitations and leg swelling.   Gastrointestinal: Negative for abdominal distention, abdominal pain, constipation, diarrhea, nausea and vomiting.   Endocrine: Positive for polyuria (Chronic). Negative for heat intolerance, polydipsia and polyphagia.   Genitourinary: Positive for frequency (reducing since change to lasix dose). Negative for difficulty urinating, dysuria, flank pain, hematuria and urgency.   Musculoskeletal: Negative for arthralgias, back pain, gait problem, joint swelling and myalgias.   Skin: Negative for color change, pallor and rash.   Neurological: Negative for dizziness, tremors, seizures, syncope, light-headedness, numbness and headaches.   Hematological: Does not bruise/bleed easily.   Psychiatric/Behavioral: Positive for sleep disturbance (Snoring ++ fragmented sleep due to nocturia and frequency.). Negative for agitation, confusion and dysphoric mood. The patient is not nervous/anxious and is not hyperactive.        Objective: BP (!) 149/73   Pulse (!) 53   Resp 18   Ht 5' 9" (1.753 m)   Wt 120.5 kg (265 lb 10.5 oz)   BMI 39.23 kg/m²  Body surface area is 2.42 meters squared.         Physical Exam   Constitutional: He is oriented to person, place, and time. He appears well-developed and well-nourished. No distress.   Pleasant elderly gentleman. Not pale, anicteric, afebrile,  Well hydrated. Not in any acute distress.   HENT:   Head: Normocephalic and atraumatic.   Nose: Nose normal.   Eyes: Conjunctivae and EOM are normal. Pupils are equal, round, and reactive to light. No scleral icterus.   Neck: Normal range of motion. Neck supple. No JVD present.   Cardiovascular: Normal rate, regular rhythm and normal " heart sounds.    Pulmonary/Chest: Effort normal and breath sounds normal. No respiratory distress. He has no wheezes.   Abdominal: There is no tenderness.   Obese anterior abdominal wall.   Musculoskeletal: Normal range of motion. He exhibits no edema.   Neurological: He is alert and oriented to person, place, and time.   Skin: Skin is warm and dry. No rash noted. He is not diaphoretic. No erythema. No pallor.   Psychiatric: He has a normal mood and affect. His behavior is normal. Judgment and thought content normal.   Vitals reviewed.      Lab Review:     Results for CHASITY NOBLE (MRN [id number]) as of 6/4/2018 14:54   Ref. Range 1/25/2018 16:14 2/22/2018 12:50 2/22/2018 13:02   WBC Latest Ref Range: 3.90 - 12.70 K/uL  6.30    RBC Latest Ref Range: 4.60 - 6.20 M/uL  4.94    Hemoglobin Latest Ref Range: 14.0 - 18.0 g/dL  13.8 (L)    Hematocrit Latest Ref Range: 40.0 - 54.0 %  41.4    MCV Latest Ref Range: 82 - 98 fL  84    MCH Latest Ref Range: 27.0 - 31.0 pg  28.0    MCHC Latest Ref Range: 32.0 - 36.0 g/dL  33.4    RDW Latest Ref Range: 11.5 - 14.5 %  14.6 (H)    Platelets Latest Ref Range: 150 - 350 K/uL  212    MPV Latest Ref Range: 9.2 - 12.9 fL  8.0 (L)    Gran% Latest Ref Range: 38.0 - 73.0 %  39.1    Gran # (ANC) Latest Ref Range: 1.8 - 7.7 K/uL  2.5    Lymph% Latest Ref Range: 18.0 - 48.0 %  42.8    Lymph # Latest Ref Range: 1.0 - 4.8 K/uL  2.7    Mono% Latest Ref Range: 4.0 - 15.0 %  12.9    Mono # Latest Ref Range: 0.3 - 1.0 K/uL  0.8    Eosinophil% Latest Ref Range: 0.0 - 8.0 %  4.9    Eos # Latest Ref Range: 0.0 - 0.5 K/uL  0.3    Basophil% Latest Ref Range: 0.0 - 1.9 %  0.3    Baso # Latest Ref Range: 0.00 - 0.20 K/uL  0.00    Sodium Latest Ref Range: 136 - 145 mmol/L  140    Potassium Latest Ref Range: 3.5 - 5.1 mmol/L  3.6    Chloride Latest Ref Range: 95 - 110 mmol/L  103    CO2 Latest Ref Range: 23 - 29 mmol/L  28    Anion Gap Latest Ref Range: 8 - 16 mmol/L  9    BUN, Bld Latest Ref Range: 8 -  23 mg/dL  13    Creatinine Latest Ref Range: 0.5 - 1.4 mg/dL  1.0    eGFR if non African American Latest Ref Range: >60 mL/min/1.73 m^2  >60    eGFR if  Latest Ref Range: >60 mL/min/1.73 m^2  >60    Glucose Latest Ref Range: 70 - 110 mg/dL  104    Calcium Latest Ref Range: 8.7 - 10.5 mg/dL  8.9    Phosphorus Latest Ref Range: 2.7 - 4.5 mg/dL  2.8    Magnesium Latest Ref Range: 1.6 - 2.6 mg/dL  1.9    Albumin Latest Ref Range: 3.5 - 5.2 g/dL  3.3 (L)    Uric Acid Latest Ref Range: 3.4 - 7.0 mg/dL  5.1    Specimen UA Unknown  Urine, Unspecified    Color, UA Latest Ref Range: Yellow, Straw, Tricia   Tricia    pH, UA Latest Ref Range: 5.0 - 8.0   6.0    Specific Gravity, UA Latest Ref Range: 1.005 - 1.030   1.020    Appearance, UA Latest Ref Range: Clear   Clear    Protein, UA Latest Ref Range: Negative   2+ (A)    Glucose, UA Latest Ref Range: Negative   Negative    Ketones, UA Latest Ref Range: Negative   1+ (A)    Occult Blood UA Latest Ref Range: Negative   Negative    Nitrite, UA Latest Ref Range: Negative   Positive (A)    Urobilinogen, UA Latest Ref Range: <2.0 EU/dL  2.0-3.0 (A)    Bilirubin (UA) Latest Ref Range: Negative   1+ (A)    Leukocytes, UA Latest Ref Range: Negative   Trace (A)    RBC, UA Latest Ref Range: 0 - 4 /hpf  1    WBC, UA Latest Ref Range: 0 - 5 /hpf  4    Bacteria, UA Latest Ref Range: None-Occ /hpf  Occasional    Squam Epithel, UA Latest Units: /hpf  2    Hyaline Casts, UA Latest Ref Range: 0-1/lpf /lpf  15 (A)    Granular Casts, UA Latest Ref Range: None /lpf  1 (A)    Amorphous, UA Latest Ref Range: None-Moderate   Occasional    Microscopic Comment Unknown  SEE COMMENT    Prot/Creat Ratio, Ur Latest Ref Range: 0.00 - 0.20   Unable to calculate    Protein, Urine Random Latest Ref Range: 0 - 15 mg/dL  79 (H)    Creatinine, Ur (mg/spec) Latest Units: mg/Spec 1800.0     CT HEAD WITHOUT CONTRAST Unknown   Rpt   Creatinine, Timed Urine Latest Ref Range: 40.0 - 75.0 mg/Hr 75.0      Creatinine, Random Ur Latest Ref Range: 23.0 - 375.0 mg/dL  >450.0 (H)    CREATININE, URINE (SEND OUT) Latest Ref Range: 23.0 - 375.0 mg/dL 60.0     Differential Method Unknown  Automated    Urine Collection Duration Latest Units: Hr 24         Assessment:     1. Hyperaldosteronism  Aldosterone    Renin    Comprehensive metabolic panel    Aldosterone, urine, 24 hour    Creatinine, urine, timed 24 Hours    Sodium, urine, timed 24 Hours   2. Essential hypertension  Aldosterone    Renin    Comprehensive metabolic panel    Aldosterone, urine, 24 hour    Creatinine, urine, timed 24 Hours    Sodium, urine, timed 24 Hours   3. Nephrolithiasis     4. Obesity, unspecified classification, unspecified obesity type, unspecified whether serious comorbidity present     5. Hypovitaminosis D  Magnesium    Phosphorus    Vitamin D    Calcium, ionized    PTH, intact   6. Dysmetabolic syndrome  DHEA    DHEA-sulfate   7. Sleep deprivation     8. BPH with obstruction/lower urinary tract symptoms          Regarding syndrome of polyuria; this appears to be driven by long term high dose lasix use which may now be associated with a degree of diuretic nephropathy or nephrogenic DI. Patients clinical presentation is not consistent with central DI.  This may be the result of nephrogenic DI from long term lasix induced nephropathy or could be related to primary hyperaldo.  Will repeat 24 hr urine collection and rest of detailed labs above.   Regarding hypertension; Mild BP elevation today; to increase amlodipine to 10mg Qd and continue rest of antihypertensives as before.. Patient to continue serial tracking of ambulatory BP trends and if while of lasix this is noticed to increase may then add on an additional antihypertensive. For now to continue rest of anthypertensives as before.  Regarding BPH; ongoing management as per Urology service.  Regarding sleep deprivation; patient will need to obtain sleep study to exclude possible coexisting  OSAS.  Regarding Obesity and possible dysmetabolic syndrome; to obtain screening HBA1c to exclude any associated dysglycemia.  Regarding possible hypogonadism; to recheck androgen profile and if consistent with hypogonadism will commence androgen repletion therapy with topical (patch) testostereone.    Plan:       FFup in ~ 2mths.

## 2018-06-05 DIAGNOSIS — E87.6 HYPOKALEMIA: ICD-10-CM

## 2018-06-05 LAB
PROSTATE SPECIFIC ANTIGEN, TOTAL: 0.62 NG/ML
PSA FREE MFR SERPL: 66.13 %
PSA FREE SERPL-MCNC: 0.41 NG/ML
PTH-INTACT SERPL-MCNC: 56 PG/ML

## 2018-06-06 LAB
ALDOST SERPL-MCNC: 18.1 NG/DL
ESTROGEN SERPL-MCNC: 97 PG/ML

## 2018-06-07 ENCOUNTER — DOCUMENTATION ONLY (OUTPATIENT)
Dept: ENDOCRINOLOGY | Facility: CLINIC | Age: 68
End: 2018-06-07

## 2018-06-07 LAB — RENIN PLAS-CCNC: 0.7 NG/ML/H

## 2018-06-07 NOTE — PROGRESS NOTES
Patients repeat guille/rin ratio remains >20. Will await results of 24 hr urine aldosterone and sodium and will then consider to obtain salt suppression test for confirmation of primary hyperaldo diagnosis.

## 2018-06-08 LAB
ALBUMIN SERPL-MCNC: 4.1 G/DL (ref 3.6–5.1)
ANDROSTANOLONE SERPL-MCNC: 268 PG/ML (ref 112–955)
CREAT 24H UR-MRATE: 85.6 MG/HR
CREAT UR-MCNC: 53 MG/DL
CREATININE, URINE (MG/SPEC): 2053.8 MG/SPEC
DHEA SERPL-MCNC: 0.39 NG/ML (ref 0.63–4.7)
SHBG SERPL-SCNC: 26 NMOL/L (ref 22–77)
SODIUM 24H UR-SRATE: 8.4 MMOL/HR
SODIUM UR-SCNC: 52 MMOL/L
SODIUM URINE (MMOL/SPEC): 202 MMOL/SPEC
TESTOST FREE SERPL-MCNC: 23.9 PG/ML (ref 6–73)
TESTOST SERPL-MCNC: 160 NG/DL (ref 250–1100)
TESTOSTERONE.FREE+WB SERPL-MCNC: 45.1 NG/DL (ref 15–150)
URINE COLLECTION DURATION: 24 HR
URINE COLLECTION DURATION: 24 HR
URINE VOLUME: 3875 ML
URINE VOLUME: 3875 ML

## 2018-06-09 DIAGNOSIS — N25.1: ICD-10-CM

## 2018-06-09 DIAGNOSIS — R35.89 POLYURIA: Primary | ICD-10-CM

## 2018-06-09 RX ORDER — DESMOPRESSIN ACETATE 0.1 MG/1
100 TABLET ORAL 2 TIMES DAILY
Qty: 180 TABLET | Refills: 3 | Status: SHIPPED | OUTPATIENT
Start: 2018-06-09 | End: 2018-09-20 | Stop reason: SDUPTHER

## 2018-06-13 ENCOUNTER — DOCUMENTATION ONLY (OUTPATIENT)
Dept: ENDOCRINOLOGY | Facility: CLINIC | Age: 68
End: 2018-06-13

## 2018-06-13 LAB
ALDOST 24H UR-MRATE: 24.5 UG/D (ref 1.2–28.1)
COLLECT DURATION TIME SPEC: 24 HR
CREAT 24H UR-MRATE: 2131 MG/D (ref 800–2100)
CREAT UR-MCNC: 55 MG/DL
SPECIMEN VOL ?TM UR: 3875 ML

## 2018-06-13 NOTE — PROGRESS NOTES
Patients 24 hr urine for aldosterone is borderline elevated. I will consider obtaining a repeat of this test in the setting or oral salt loading depending on his clinical response to desmopressin.

## 2018-06-14 ENCOUNTER — LAB VISIT (OUTPATIENT)
Dept: LAB | Facility: HOSPITAL | Age: 68
End: 2018-06-14
Attending: INTERNAL MEDICINE
Payer: MEDICARE

## 2018-06-14 DIAGNOSIS — R35.89 POLYURIA: ICD-10-CM

## 2018-06-14 LAB — OSMOLALITY UR: 527 MOSM/KG

## 2018-06-14 PROCEDURE — 83935 ASSAY OF URINE OSMOLALITY: CPT

## 2018-06-22 ENCOUNTER — HOSPITAL ENCOUNTER (OUTPATIENT)
Dept: RADIOLOGY | Facility: HOSPITAL | Age: 68
Discharge: HOME | End: 2018-06-22
Attending: INTERNAL MEDICINE
Payer: MEDICARE

## 2018-06-22 DIAGNOSIS — N25.1: ICD-10-CM

## 2018-06-22 DIAGNOSIS — R35.89 POLYURIA: ICD-10-CM

## 2018-06-22 PROCEDURE — 25500020 PHARM REV CODE 255: Performed by: INTERNAL MEDICINE

## 2018-06-22 PROCEDURE — 70553 MRI BRAIN STEM W/O & W/DYE: CPT | Mod: TC

## 2018-06-22 PROCEDURE — 70553 MRI BRAIN STEM W/O & W/DYE: CPT | Mod: 26,,, | Performed by: RADIOLOGY

## 2018-06-22 PROCEDURE — A9585 GADOBUTROL INJECTION: HCPCS | Performed by: INTERNAL MEDICINE

## 2018-06-22 RX ORDER — GADOBUTROL 604.72 MG/ML
5 INJECTION INTRAVENOUS
Status: COMPLETED | OUTPATIENT
Start: 2018-06-22 | End: 2018-06-22

## 2018-06-22 RX ADMIN — GADOBUTROL 5 ML: 604.72 INJECTION INTRAVENOUS at 11:06

## 2018-09-14 ENCOUNTER — LAB VISIT (OUTPATIENT)
Dept: LAB | Facility: HOSPITAL | Age: 68
End: 2018-09-14
Attending: INTERNAL MEDICINE
Payer: MEDICARE

## 2018-09-14 ENCOUNTER — OFFICE VISIT (OUTPATIENT)
Dept: ENDOCRINOLOGY | Facility: CLINIC | Age: 68
End: 2018-09-14
Payer: MEDICARE

## 2018-09-14 VITALS
SYSTOLIC BLOOD PRESSURE: 131 MMHG | HEIGHT: 69 IN | HEART RATE: 65 BPM | RESPIRATION RATE: 16 BRPM | TEMPERATURE: 99 F | WEIGHT: 265.88 LBS | BODY MASS INDEX: 39.38 KG/M2 | DIASTOLIC BLOOD PRESSURE: 68 MMHG

## 2018-09-14 DIAGNOSIS — I10 ESSENTIAL HYPERTENSION: ICD-10-CM

## 2018-09-14 DIAGNOSIS — N20.0 NEPHROLITHIASIS: ICD-10-CM

## 2018-09-14 DIAGNOSIS — N25.1: ICD-10-CM

## 2018-09-14 DIAGNOSIS — E66.9 OBESITY, UNSPECIFIED CLASSIFICATION, UNSPECIFIED OBESITY TYPE, UNSPECIFIED WHETHER SERIOUS COMORBIDITY PRESENT: ICD-10-CM

## 2018-09-14 DIAGNOSIS — E26.9 HYPERALDOSTERONISM: ICD-10-CM

## 2018-09-14 DIAGNOSIS — N40.1 BPH WITH OBSTRUCTION/LOWER URINARY TRACT SYMPTOMS: ICD-10-CM

## 2018-09-14 DIAGNOSIS — E55.9 HYPOVITAMINOSIS D: ICD-10-CM

## 2018-09-14 DIAGNOSIS — N13.8 BPH WITH OBSTRUCTION/LOWER URINARY TRACT SYMPTOMS: ICD-10-CM

## 2018-09-14 DIAGNOSIS — N25.1: Primary | ICD-10-CM

## 2018-09-14 DIAGNOSIS — R35.89 POLYURIA: ICD-10-CM

## 2018-09-14 DIAGNOSIS — E88.810 DYSMETABOLIC SYNDROME: ICD-10-CM

## 2018-09-14 LAB
ALBUMIN SERPL BCP-MCNC: 3.7 G/DL
ALP SERPL-CCNC: 76 U/L
ALT SERPL W/O P-5'-P-CCNC: 13 U/L
ANION GAP SERPL CALC-SCNC: 8 MMOL/L
AST SERPL-CCNC: 13 U/L
BILIRUB SERPL-MCNC: 0.5 MG/DL
BUN SERPL-MCNC: 12 MG/DL
CALCIUM SERPL-MCNC: 9.2 MG/DL
CHLORIDE SERPL-SCNC: 106 MMOL/L
CO2 SERPL-SCNC: 28 MMOL/L
GLUCOSE SERPL-MCNC: 92 MG/DL
OSMOLALITY SERPL: 296 MOSM/KG
POTASSIUM SERPL-SCNC: 3.7 MMOL/L
PROT SERPL-MCNC: 7.3 G/DL
SODIUM SERPL-SCNC: 142 MMOL/L

## 2018-09-14 PROCEDURE — 83930 ASSAY OF BLOOD OSMOLALITY: CPT

## 2018-09-14 PROCEDURE — 80053 COMPREHEN METABOLIC PANEL: CPT

## 2018-09-14 PROCEDURE — 99214 OFFICE O/P EST MOD 30 MIN: CPT | Mod: PBBFAC,PO | Performed by: INTERNAL MEDICINE

## 2018-09-14 PROCEDURE — 99214 OFFICE O/P EST MOD 30 MIN: CPT | Mod: S$PBB,,, | Performed by: INTERNAL MEDICINE

## 2018-09-14 PROCEDURE — 99999 PR PBB SHADOW E&M-EST. PATIENT-LVL IV: CPT | Mod: PBBFAC,,, | Performed by: INTERNAL MEDICINE

## 2018-09-14 PROCEDURE — 82088 ASSAY OF ALDOSTERONE: CPT

## 2018-09-14 NOTE — PROGRESS NOTES
Subjective:      Patient ID: Srinivasa White is a 68 y.o. male.    Chief Complaint:  nephrogenic diabetes insipidus    68 yr old gentleman seen in [facility] today on account of polyuria presumed to be due to partial nephrogenic DI and biochemical features suggestive of hyperaldosteronism.        History of Present Illness    Patient is a 68 yr old gentleman seen in [facility] visit today on account of polyuria. He does have a background history of stable nephrolithiasis which was non obstructive at his last imagine (USS of retroperitoneum from 07/15) and essential hypertension. He has been having nocturia and daytime frequency. He also has BPH which has been managed with prostatectomy with marked improvement in urinary stream and flow. Patient is however on high dose lasix 40mg BID!!! This was started initially as an antihypertensive ~ 10 yrs ago.  Patient since stopping the lasix has continued to have have polyuria though the overall volume has reduced signiifcantly     Patient baseline Mansfield score is 13. Patient has never had sleep study.  Patient indicates that he has nocturia; apparently wakes to urinate ~ every hour..  Patients nephrologist is Dr Rickey Gregg.  He denies any prior history of head injury and he indicates that with the recent change in the timing and total dose of his lasix, his nocturia has significantly reduced as has his total daily urinary output. In addition, with the use of compressive stockings his frequency and volume of urination has also diminshed      Patients initial lab review shows biochemical features suggestive of primary hyperaldosteronism though his current BP was normal (on antihypertensives).  Review of additional outside labs from 02/18 showed extensive rheumatologic screening labs which were all normal other than basic URSZULA screen. Testosterone levels from 03/17 were also reduced (170).    Patient had a recent fall at home ~ 3 weeks ago which involved a left side injury.  He  "continues to have some headaches ffing the fall.    Patients continues to have significant nocturia but his daily urine amount has overall reduced.         Review of Systems   Constitutional: Positive for fatigue (improved.). Negative for activity change, diaphoresis, fever and unexpected weight change.   HENT: Negative for facial swelling, sore throat, trouble swallowing and voice change.    Eyes: Negative for photophobia and visual disturbance.   Respiratory: Negative for cough, shortness of breath and wheezing.    Cardiovascular: Negative for chest pain, palpitations and leg swelling.   Gastrointestinal: Negative for abdominal distention, abdominal pain, constipation, diarrhea, nausea and vomiting.   Endocrine: Positive for polyuria (Chronic). Negative for heat intolerance, polydipsia and polyphagia.   Genitourinary: Positive for frequency (reducing since change to lasix dose). Negative for difficulty urinating, dysuria, flank pain, hematuria and urgency.   Musculoskeletal: Negative for arthralgias, back pain, gait problem, joint swelling and myalgias.   Skin: Negative for color change, pallor and rash.   Neurological: Negative for dizziness, tremors, seizures, syncope, light-headedness, numbness and headaches.   Hematological: Does not bruise/bleed easily.   Psychiatric/Behavioral: Positive for sleep disturbance (Snoring ++ fragmented sleep due to nocturia and frequency.). Negative for agitation, confusion and dysphoric mood. The patient is not nervous/anxious and is not hyperactive.        Objective: /68 (BP Location: Right arm, Patient Position: Sitting, BP Method: Large (Automatic))   Pulse 65   Temp 98.5 °F (36.9 °C) (Oral)   Resp 16   Ht 5' 9" (1.753 m)   Wt 120.6 kg (265 lb 14 oz)   BMI 39.26 kg/m²  Body surface area is 2.42 meters squared.   18" neck circ, 49" waist circ, 52" hip circ         Physical Exam   Constitutional: He is oriented to person, place, and time. He appears well-developed " and well-nourished. No distress.   Pleasant elderly gentleman. Not pale, anicteric, afebrile,   Mildly dehydrated. Not in any acute distress.   HENT:   Head: Normocephalic and atraumatic.   Nose: Nose normal.   Eyes: Conjunctivae and EOM are normal. Pupils are equal, round, and reactive to light. No scleral icterus.   Neck: Normal range of motion. Neck supple. No JVD present.   Cardiovascular: Normal rate, regular rhythm and normal heart sounds.   Pulmonary/Chest: Effort normal and breath sounds normal. No stridor. No respiratory distress. He has no wheezes.   Abdominal: There is no tenderness.   Obese anterior abdominal wall.   Musculoskeletal: Normal range of motion. He exhibits no edema.   Neurological: He is alert and oriented to person, place, and time.   Skin: Skin is warm and dry. No rash noted. He is not diaphoretic. No erythema. No pallor.   Psychiatric: He has a normal mood and affect. His behavior is normal. Judgment and thought content normal.   Vitals reviewed.      Lab Review:     Results for CHASITY NOBLE (MRN [id number]) as of 9/14/2018 15:55   Ref. Range 6/4/2018 15:38 6/4/2018 15:50 6/14/2018 10:55   Sodium Latest Ref Range: 136 - 145 mmol/L  141    Potassium Latest Ref Range: 3.5 - 5.1 mmol/L  3.0 (L)    Chloride Latest Ref Range: 95 - 110 mmol/L  103    CO2 Latest Ref Range: 23 - 29 mmol/L  30 (H)    Anion Gap Latest Ref Range: 8 - 16 mmol/L  8    BUN, Bld Latest Ref Range: 8 - 23 mg/dL  12    Creatinine Latest Ref Range: 0.5 - 1.4 mg/dL  0.9    eGFR if non African American Latest Ref Range: >60 mL/min/1.73 m^2  >60.0    eGFR if African American Latest Ref Range: >60 mL/min/1.73 m^2  >60.0    Glucose Latest Ref Range: 70 - 110 mg/dL  84    Calcium Latest Ref Range: 8.7 - 10.5 mg/dL  9.4    Calcium, Ion Latest Ref Range: 1.06 - 1.42 mmol/L  1.23    Phosphorus Latest Ref Range: 2.7 - 4.5 mg/dL  3.1    Magnesium Latest Ref Range: 1.6 - 2.6 mg/dL  2.0    Alkaline Phosphatase Latest Ref Range: 55 -  135 U/L  70    Total Protein Latest Ref Range: 6.0 - 8.4 g/dL  7.3    Albumin Latest Ref Range: 3.5 - 5.2 g/dL  3.8    Total Bilirubin Latest Ref Range: 0.1 - 1.0 mg/dL  0.5    AST Latest Ref Range: 10 - 40 U/L  16    ALT Latest Ref Range: 10 - 44 U/L  17    Vit D, 25-Hydroxy Latest Ref Range: 30 - 96 ng/mL  39    Aldosterone Latest Units: ng/dL  18.1    PTH Latest Ref Range: 9.0 - 77.0 pg/mL  56.0    PSA Total Latest Ref Range: 0.00 - 4.00 ng/mL  0.62    PSA, Free Latest Ref Range: 0.01 - 1.50 ng/mL  0.41    PSA, Free Pct Latest Ref Range: Not established %  66.13    DHEA Latest Ref Range: 0.630 - 4.700 ng/mL  0.391 (L)    DHEA-SO4 Latest Ref Range: 228.5 - 283.6 ug/dL  72.6 (L)    Dihydrotestosterone Latest Ref Range: 112 - 955 pg/mL  268    Estrogen Latest Units: pg/mL  97    FSH Latest Ref Range: 0.95 - 11.95 mIU/mL  8.10    LH Latest Ref Range: 0.6 - 12.1 mIU/mL  3.1    Testosterone Latest Ref Range: 250 - 1100 ng/dL  160 (L)    Testosterone, Free Latest Ref Range: 6.0 - 73.0 pg/mL  23.9    Testosterone Testosterone Latest Ref Range: 15.0 - 150.0 ng/dL  45.1    Sex Hormone Binding Globulin Latest Ref Range: 22 - 77 nmol/L  26    Albumin Latest Ref Range: 3.6 - 5.1 g/dL  4.1    Sodium, Timed Ur Latest Ref Range: 2.0 - 9.0 mmol/Hr 8.4     NA, UR mmol/spec Latest Units: mmol/Spec 202     Osmolality, Ur Latest Ref Range: 50 - 1200 mOsm/kg   527   Renin Activity Latest Units: ng/mL/h  0.7    Sodium, Urine Latest Ref Range: 20 - 250 mmol/L 52     Urine Collection Duration Latest Units: Hr 24         Assessment:     1. Acquired nephrogenic diabetes insipidus  Osmolality, urine    Osmolality    Chloride, urine, random    Sodium, urine, random    Comprehensive metabolic panel    CT Head W Wo Contrast   2. Hyperaldosteronism  Aldosterone    Renin   3. Dysmetabolic syndrome     4. Obesity, unspecified classification, unspecified obesity type, unspecified whether serious comorbidity present     5. Hypovitaminosis D     6.  Nephrolithiasis  Creatinine, serum   7. Essential hypertension  Comprehensive metabolic panel    Creatinine, serum   8. BPH with obstruction/lower urinary tract symptoms          Regarding syndrome of polyuria; This persists though somewhat improved since starting desmopressin. This appears to be due to  nephrogenic DI. Patients clinical presentation is not consistent with central DI.  This may be the result of nephrogenic DI from long term lasix induced nephropathy or could be related to primary hyperaldo.  Will repeat 24 hr urine collection and rest of detailed labs above to compare to values prior to starting desmopressin and based on that will decide on need to adjust Desmopressin dose. Will contact patients nephrologist; Dr Gregg to discuss treatment plan once we get his update lab results; office #; [phone].  Regarding hypertension; Presently well controlled; to increase amlodipine to 10mg Qd and continue rest of antihypertensives as before.. Patient to continue serial tracking of ambulatory BP trends and if while of lasix this is noticed to increase may then add on an additional antihypertensive. For now to continue rest of anthypertensives as before.  Regarding BPH; ongoing management as per Urology service.  Regarding sleep deprivation; patient will need to obtain sleep study to exclude possible coexisting OSAS.  Regarding Obesity and possible dysmetabolic syndrome; to obtain screening HBA1c to exclude any associated dysglycemia.  Regarding possible hypogonadism; Most recent androgen panel was normal.  Regarding post traumatic headaches; to obtain brani CT to exclude any traumatic related SOL.        Plan:       FFup in ~ 4mths

## 2018-09-17 LAB — ALDOST SERPL-MCNC: 36.3 NG/DL

## 2018-09-19 ENCOUNTER — TELEPHONE (OUTPATIENT)
Dept: ENDOCRINOLOGY | Facility: CLINIC | Age: 68
End: 2018-09-19

## 2018-09-19 NOTE — TELEPHONE ENCOUNTER
----- Message from Amanda Edmond sent at 9/19/2018 11:51 AM CDT -----  Good morning, I have this pt on my schedule for Friday and we need STAT Creatinine Serum lab orders placed in Epic so we can schedule them for before their scan. Thank you:)

## 2018-09-20 DIAGNOSIS — R35.89 POLYURIA: ICD-10-CM

## 2018-09-20 DIAGNOSIS — N25.1: ICD-10-CM

## 2018-09-20 DIAGNOSIS — E87.6 HYPOKALEMIA: ICD-10-CM

## 2018-09-20 DIAGNOSIS — E26.9 HYPERALDOSTERONISM: ICD-10-CM

## 2018-09-20 DIAGNOSIS — I15.9 SECONDARY HYPERTENSION: Primary | ICD-10-CM

## 2018-09-20 LAB — RENIN PLAS-CCNC: <0.6 NG/ML/H

## 2018-09-20 RX ORDER — DESMOPRESSIN ACETATE 0.1 MG/1
100 TABLET ORAL 2 TIMES DAILY
Qty: 180 TABLET | Refills: 3 | Status: SHIPPED | OUTPATIENT
Start: 2018-09-20 | End: 2022-07-20

## 2018-09-20 RX ORDER — AMLODIPINE BESYLATE 5 MG/1
10 TABLET ORAL DAILY
Qty: 90 TABLET | Refills: 3 | Status: SHIPPED | OUTPATIENT
Start: 2018-09-20 | End: 2019-03-19 | Stop reason: SDUPTHER

## 2018-09-21 ENCOUNTER — HOSPITAL ENCOUNTER (OUTPATIENT)
Dept: RADIOLOGY | Facility: HOSPITAL | Age: 68
Discharge: HOME | End: 2018-09-21
Attending: INTERNAL MEDICINE
Payer: MEDICARE

## 2018-09-21 DIAGNOSIS — N25.1: ICD-10-CM

## 2018-09-21 PROCEDURE — 70450 CT HEAD/BRAIN W/O DYE: CPT | Mod: 26,,, | Performed by: RADIOLOGY

## 2018-09-21 PROCEDURE — 70450 CT HEAD/BRAIN W/O DYE: CPT | Mod: TC

## 2018-09-21 RX ORDER — SODIUM CHLORIDE 9 MG/ML
INJECTION, SOLUTION INTRAVENOUS
Status: DISCONTINUED
Start: 2018-09-21 | End: 2018-09-21 | Stop reason: WASHOUT

## 2018-09-24 RX ORDER — CARVEDILOL 12.5 MG/1
12.5 TABLET ORAL 2 TIMES DAILY
Status: CANCELLED | OUTPATIENT
Start: 2018-09-24

## 2018-09-24 RX ORDER — CARVEDILOL 12.5 MG/1
12.5 TABLET ORAL 2 TIMES DAILY
Qty: 180 TABLET | Refills: 3 | Status: SHIPPED | OUTPATIENT
Start: 2018-09-24 | End: 2019-07-22 | Stop reason: SDUPTHER

## 2018-10-04 ENCOUNTER — TELEPHONE (OUTPATIENT)
Dept: ENDOCRINOLOGY | Facility: CLINIC | Age: 68
End: 2018-10-04

## 2018-10-04 NOTE — TELEPHONE ENCOUNTER
----- Message from Yessica Hernandez sent at 10/4/2018  8:04 AM CDT -----  Contact: self  Patient need to speak to nurse regarding appointment for tomorrow 10/5    Please call to advice [phone] (home)       Patient states he not sure if he was informed to fast

## 2018-10-04 NOTE — TELEPHONE ENCOUNTER
Advised he would need to speak with Radiology for instructions other that 4 hour fast and arrive 2 hours prior for prep

## 2018-10-05 ENCOUNTER — TELEPHONE (OUTPATIENT)
Dept: ENDOCRINOLOGY | Facility: CLINIC | Age: 68
End: 2018-10-05

## 2018-10-05 ENCOUNTER — HOSPITAL ENCOUNTER (OUTPATIENT)
Dept: RADIOLOGY | Facility: HOSPITAL | Age: 68
Discharge: HOME | End: 2018-10-05
Attending: INTERNAL MEDICINE
Payer: MEDICARE

## 2018-10-05 DIAGNOSIS — E26.9 HYPERALDOSTERONISM: Primary | ICD-10-CM

## 2018-10-05 DIAGNOSIS — E26.9 HYPERALDOSTERONISM: ICD-10-CM

## 2018-10-05 DIAGNOSIS — I15.9 SECONDARY HYPERTENSION: ICD-10-CM

## 2018-10-05 PROBLEM — E27.9 ADRENAL NODULE: Status: ACTIVE | Noted: 2018-10-05

## 2018-10-05 PROBLEM — E27.8 ADRENAL NODULE: Status: ACTIVE | Noted: 2018-10-05

## 2018-10-05 PROCEDURE — 74178 CT ABD&PLV WO CNTR FLWD CNTR: CPT | Mod: TC

## 2018-10-05 PROCEDURE — 74178 CT ABD&PLV WO CNTR FLWD CNTR: CPT | Mod: 26,,, | Performed by: RADIOLOGY

## 2018-10-05 PROCEDURE — 25500020 PHARM REV CODE 255

## 2018-10-05 RX ORDER — EPLERENONE 25 MG/1
25 TABLET, FILM COATED ORAL DAILY
Qty: 90 TABLET | Refills: 3 | Status: SHIPPED | OUTPATIENT
Start: 2018-10-05 | End: 2019-07-22 | Stop reason: SDUPTHER

## 2018-10-05 RX ORDER — SODIUM CHLORIDE 9 MG/ML
INJECTION, SOLUTION INTRAVENOUS
Status: DISPENSED
Start: 2018-10-05 | End: 2018-10-05

## 2018-10-05 RX ADMIN — IOHEXOL 30 ML: 350 INJECTION, SOLUTION INTRAVENOUS at 08:10

## 2018-10-05 RX ADMIN — IOHEXOL 100 ML: 350 INJECTION, SOLUTION INTRAVENOUS at 08:10

## 2018-10-05 NOTE — TELEPHONE ENCOUNTER
----- Message from Danielle Monroe sent at 10/5/2018  2:33 PM CDT -----  Please call pt at [phone] to discuss a new blood pressure medication being called in / he would like to verify directions

## 2018-10-05 NOTE — TELEPHONE ENCOUNTER
Gave patient test results from CT. Advised that the new medication was given to him by dr to reduce the effects of the aldosterone excess). This could also improve his excess urine production. Patient verbalized understanding.

## 2018-10-11 ENCOUNTER — APPOINTMENT (OUTPATIENT)
Dept: LAB | Facility: HOSPITAL | Age: 68
End: 2018-10-11
Attending: INTERNAL MEDICINE
Payer: MEDICARE

## 2018-10-16 ENCOUNTER — TELEPHONE (OUTPATIENT)
Dept: ENDOCRINOLOGY | Facility: CLINIC | Age: 68
End: 2018-10-16

## 2018-10-16 NOTE — TELEPHONE ENCOUNTER
Read message as written. Patient has many questions. Wants to know what  Dr means that urine was under 3 liters when measurements on container were over 5000 and he filled it to the rim. Please call.

## 2018-10-16 NOTE — TELEPHONE ENCOUNTER
----- Message from David Banks MD sent at 10/12/2018 12:55 PM CDT -----  Kindly inform Mr White that his urine output continues to improve. Compared to 4 months ago hsi 24 hour urine production is now under 3 liters and is now in the normal range.  Thanks    David Banks MD

## 2019-03-11 ENCOUNTER — DOCUMENTATION ONLY (OUTPATIENT)
Dept: ENDOCRINOLOGY | Facility: CLINIC | Age: 69
End: 2019-03-11

## 2019-03-11 NOTE — PROGRESS NOTES
I have reviewed the lab results for the patient obtained at [facility] ( via [facility]). These labs were obtained 03/04/19.  The details of these results are scanned in the media tab section;   Amylase/ lipase 23 (l)  CRP levels; 0.6 (n)  Total testosterone; 187 (l) prolactin; 18.5 (4-15.2), FSH/LH; 8.3/4.2   25 OH vit D; 27 (l)  TFTs; TSH; 0.009 FT3; 3.4, FT4; 1.41 (all N)  Folate; high  C peptide; 9.1, Insulin; 83.9  WBC count; HCT; 43.8, WBC; 8.05, Plt; 194. Rest of CBC; normal  Chem panel; agap; 18, BUN/cr; 8/0.7, glc; 132. ALP; 74, AST/ALT; 19/14, GGT; 14. Rest of chem panel is normal.   Rest of LFTs essentially normal.  Uric acid; 4.3, CRP; 0.6 (marginally  Inc) PO4; 3.4, mag; 2.1 (n)  Iron; 76,  (n) rest of iron kinetics ; n,   HBA1c; 6.6, cortisol; 7.5   Lipid profile; Tot Chol; 176, trig; 89, HDL; 42, LDL; 116.    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 Will discuss these findings with the patient at his next up visit.

## 2019-03-19 RX ORDER — AMLODIPINE BESYLATE 5 MG/1
TABLET ORAL
Qty: 90 TABLET | Refills: 3 | Status: SHIPPED | OUTPATIENT
Start: 2019-03-19 | End: 2019-07-22 | Stop reason: SDUPTHER

## 2019-07-22 DIAGNOSIS — E26.9 HYPERALDOSTERONISM: ICD-10-CM

## 2019-07-22 DIAGNOSIS — I15.9 SECONDARY HYPERTENSION: ICD-10-CM

## 2019-07-22 RX ORDER — HYDRALAZINE HYDROCHLORIDE 50 MG/1
TABLET, FILM COATED ORAL
Qty: 360 TABLET | Refills: 3 | Status: SHIPPED | OUTPATIENT
Start: 2019-07-22 | End: 2022-01-10 | Stop reason: ALTCHOICE

## 2019-07-22 RX ORDER — AMLODIPINE BESYLATE 5 MG/1
TABLET ORAL
Qty: 90 TABLET | Refills: 3 | Status: SHIPPED | OUTPATIENT
Start: 2019-07-22 | End: 2022-01-10 | Stop reason: ALTCHOICE

## 2019-07-22 RX ORDER — CARVEDILOL 12.5 MG/1
TABLET ORAL
Qty: 180 TABLET | Refills: 3 | Status: SHIPPED | OUTPATIENT
Start: 2019-07-22 | End: 2020-09-06

## 2019-07-22 RX ORDER — EPLERENONE 25 MG/1
TABLET, FILM COATED ORAL
Qty: 90 TABLET | Refills: 3 | Status: SHIPPED | OUTPATIENT
Start: 2019-07-22 | End: 2022-01-10 | Stop reason: ALTCHOICE

## 2019-09-13 RX ORDER — AMLODIPINE BESYLATE 5 MG/1
TABLET ORAL
Qty: 90 TABLET | Refills: 3 | Status: SHIPPED | OUTPATIENT
Start: 2019-09-13 | End: 2022-01-10 | Stop reason: ALTCHOICE

## 2019-11-26 DIAGNOSIS — E87.6 HYPOKALEMIA: ICD-10-CM

## 2020-01-03 ENCOUNTER — TELEPHONE (OUTPATIENT)
Dept: UROLOGY | Facility: CLINIC | Age: 70
End: 2020-01-03

## 2020-01-03 NOTE — TELEPHONE ENCOUNTER
Pt just needs to reschedule from 01/17/2020 due to dr. Orlando not being in office that day. LVM to return call.

## 2020-01-03 NOTE — TELEPHONE ENCOUNTER
----- Message from Melissa Haddad sent at 1/3/2020  9:10 AM CST -----  Contact: Patient  Type:  Patient Returning Call    Who Called:  Patient  Who Left Message for Patient:  Nicky  Does the patient know what this is regarding?:  yeah  Best Call Back Number:  [phone]  Additional Information:

## 2020-01-29 ENCOUNTER — LAB VISIT (OUTPATIENT)
Dept: LAB | Facility: HOSPITAL | Age: 70
End: 2020-01-29
Attending: UROLOGY
Payer: MEDICARE

## 2020-01-29 ENCOUNTER — OFFICE VISIT (OUTPATIENT)
Dept: UROLOGY | Facility: CLINIC | Age: 70
End: 2020-01-29
Payer: MEDICARE

## 2020-01-29 VITALS
SYSTOLIC BLOOD PRESSURE: 141 MMHG | BODY MASS INDEX: 38.44 KG/M2 | TEMPERATURE: 99 F | DIASTOLIC BLOOD PRESSURE: 67 MMHG | WEIGHT: 260.31 LBS | HEART RATE: 59 BPM

## 2020-01-29 DIAGNOSIS — R35.0 FREQUENCY OF URINATION AND POLYURIA: Primary | ICD-10-CM

## 2020-01-29 DIAGNOSIS — R35.0 FREQUENCY OF URINATION AND POLYURIA: ICD-10-CM

## 2020-01-29 DIAGNOSIS — R35.89 FREQUENCY OF URINATION AND POLYURIA: Primary | ICD-10-CM

## 2020-01-29 DIAGNOSIS — R35.89 FREQUENCY OF URINATION AND POLYURIA: ICD-10-CM

## 2020-01-29 LAB
BILIRUB SERPL-MCNC: NEGATIVE MG/DL
BLOOD URINE, POC: NEGATIVE
COLOR, POC UA: ABNORMAL
COMPLEXED PSA SERPL-MCNC: 0.69 NG/ML (ref 0–4)
GLUCOSE UR QL STRIP: NEGATIVE
KETONES UR QL STRIP: NEGATIVE
LEUKOCYTE ESTERASE URINE, POC: NEGATIVE
NITRITE, POC UA: NEGATIVE
PH, POC UA: 6
PROTEIN, POC: ABNORMAL
SPECIFIC GRAVITY, POC UA: 1.02
UROBILINOGEN, POC UA: NEGATIVE

## 2020-01-29 PROCEDURE — 99214 PR OFFICE/OUTPT VISIT, EST, LEVL IV, 30-39 MIN: ICD-10-PCS | Mod: S$PBB,,, | Performed by: UROLOGY

## 2020-01-29 PROCEDURE — 1159F MED LIST DOCD IN RCRD: CPT | Mod: ,,, | Performed by: UROLOGY

## 2020-01-29 PROCEDURE — 36415 COLL VENOUS BLD VENIPUNCTURE: CPT

## 2020-01-29 PROCEDURE — 1159F PR MEDICATION LIST DOCUMENTED IN MEDICAL RECORD: ICD-10-PCS | Mod: ,,, | Performed by: UROLOGY

## 2020-01-29 PROCEDURE — 99999 PR PBB SHADOW E&M-EST. PATIENT-LVL II: ICD-10-PCS | Mod: PBBFAC,,, | Performed by: UROLOGY

## 2020-01-29 PROCEDURE — 99212 OFFICE O/P EST SF 10 MIN: CPT | Mod: PBBFAC | Performed by: UROLOGY

## 2020-01-29 PROCEDURE — 1125F PR PAIN SEVERITY QUANTIFIED, PAIN PRESENT: ICD-10-PCS | Mod: ,,, | Performed by: UROLOGY

## 2020-01-29 PROCEDURE — 99999 PR PBB SHADOW E&M-EST. PATIENT-LVL II: CPT | Mod: PBBFAC,,, | Performed by: UROLOGY

## 2020-01-29 PROCEDURE — 1125F AMNT PAIN NOTED PAIN PRSNT: CPT | Mod: ,,, | Performed by: UROLOGY

## 2020-01-29 PROCEDURE — 84153 ASSAY OF PSA TOTAL: CPT

## 2020-01-29 PROCEDURE — 81002 URINALYSIS NONAUTO W/O SCOPE: CPT | Mod: PBBFAC | Performed by: UROLOGY

## 2020-01-29 PROCEDURE — 99214 OFFICE O/P EST MOD 30 MIN: CPT | Mod: S$PBB,,, | Performed by: UROLOGY

## 2020-01-29 RX ORDER — MAGNESIUM 250 MG
TABLET ORAL 2 TIMES DAILY
COMMUNITY
End: 2022-01-23

## 2020-01-29 RX ORDER — VITAMIN B COMPLEX
1 CAPSULE ORAL DAILY
COMMUNITY

## 2020-01-29 RX ORDER — IRBESARTAN 75 MG/1
75 TABLET ORAL NIGHTLY
COMMUNITY
End: 2022-01-10 | Stop reason: ALTCHOICE

## 2020-01-29 RX ORDER — OMEPRAZOLE 40 MG/1
40 CAPSULE, DELAYED RELEASE ORAL DAILY
COMMUNITY

## 2020-01-29 NOTE — LETTER
January 29, 2020      Renny Douglas MD  [address]  [facility] [place] [address]           [facility]  [address] [place] [address]  Phone: [phone]  Fax: [phone]          Patient: Srinivasa White   MR Number: [id number]   YOB: 1950   Date of Visit: 1/29/2020       Dear Dr. Renny Douglas:    Thank you for referring Srinivasa White to me for evaluation. Attached you will find relevant portions of my assessment and plan of care.    If you have questions, please do not hesitate to call me. I look forward to following Srinivasa White along with you.    Sincerely,    Lupillo Orlando MD    Enclosure  CC:  No Recipients    If you would like to receive this communication electronically, please contact [email or web address] or [phone] to request more information on [facility] [employer] access.    For providers and/or their staff who would like to refer a patient to [facility], please contact us through our one-stop-shop provider referral line, [facility], at [phone].    If you feel you have received this communication in error or would no longer like to receive these types of communications, please e-mail [email or web address]

## 2020-01-29 NOTE — PROGRESS NOTES
Subjective:       Patient ID: Srinivasa White is a 69 y.o. male.    Chief Complaint:   BPH with lower urinary tract symptoms  HPI   Mr. White is a 69-year-old male who has history of diabetes insipida under treatment with Desmopresin.  Is to be noted also that he underwent a green light laser prostatectomy on 10/20/2015 for obstructive symptoms due to BPH.  Last visit with us was in January 2017 and he refers that since then is doing better with his a nocturnal polyuria.  He refers that have an adequate urinary flow and he feels like he can't empty the bladder satisfactory.  His past  history is essentially negative except for the above procedure and symptom relief.  At the present time the patient referred that have nocturia x3 locations time for no dysuria no hematuria the flow is adequate and he feels that he empties the bladder satisfactory.  He also referred that during the day he urinates every hour hour and a half.  Last year he had an episode of urinary tract infection with hematuria that was treated properly with resolution of his hematuria.    The family history is negative for prostate cancer.  The past medical and surgical history the current medications and allergies are well documented in the medical record and all these were reviewed by me during this visit.  He is under no  medications.  Review of Systems   Constitutional: Negative for activity change and appetite change.   HENT: Negative.    Eyes: Negative for discharge.   Respiratory: Negative for cough and shortness of breath.    Cardiovascular: Negative for chest pain and palpitations.   Gastrointestinal: Negative for abdominal distention, abdominal pain, constipation and vomiting.   Genitourinary: Positive for frequency. Negative for discharge, dysuria, flank pain, hematuria, testicular pain and urgency.   Musculoskeletal: Negative for arthralgias.   Skin: Negative for rash.   Neurological: Negative for dizziness.    Psychiatric/Behavioral: The patient is not nervous/anxious.            Objective:      Physical Exam   Constitutional: He appears well-developed and well-nourished.   Morbid obese males 260 Lb. Counseled.   HENT:   Head: Normocephalic.   Eyes: Pupils are equal, round, and reactive to light.   Neck: Normal range of motion.   Cardiovascular: Normal rate.    Pulmonary/Chest: Effort normal.   Abdominal: Soft. He exhibits no distension and no mass. There is no tenderness.   Genitourinary: Rectum normal, prostate normal and penis normal. Rectal exam shows no external hemorrhoid, no mass and no tenderness. Prostate is not enlarged and not tender. Right testis shows no mass and no tenderness. Left testis shows no mass and no tenderness. No discharge found.                 Musculoskeletal: Normal range of motion.   Neurological: He is alert.   Skin: Skin is warm.     Psychiatric: He has a normal mood and affect.       Assessment:       1. Frequency of urination and polyuria        Plan:       Frequency of urination and polyuria  -     POCT URINE DIPSTICK WITHOUT MICROSCOPE  -     Prostate Specific Antigen, Diagnostic; Future; Expected date: 01/29/2020     I offered the patient on anticholinergic type medicine to increase his bladder capacity but he refuses it.  He refers that he is taking too many medications as it is and he will prefer not to take anything at this point.  He is going to be informed of the results of the PSA.  Plan to return to clinic in 1 year

## 2021-02-12 DIAGNOSIS — I10 ESSENTIAL (PRIMARY) HYPERTENSION: Primary | ICD-10-CM

## 2021-02-12 DIAGNOSIS — R60.0 LOCALIZED EDEMA: ICD-10-CM

## 2021-02-12 DIAGNOSIS — I77.810 THORACIC AORTIC ECTASIA: ICD-10-CM

## 2021-02-12 DIAGNOSIS — E11.65 TYPE 2 DIABETES MELLITUS WITH HYPERGLYCEMIA: ICD-10-CM

## 2021-02-12 DIAGNOSIS — R94.31 ABNORMAL ELECTROCARDIOGRAM: ICD-10-CM

## 2021-02-12 DIAGNOSIS — E78.2 MIXED HYPERLIPIDEMIA: ICD-10-CM

## 2021-02-19 ENCOUNTER — HOSPITAL ENCOUNTER (OUTPATIENT)
Dept: RADIOLOGY | Facility: HOSPITAL | Age: 71
Discharge: HOME | End: 2021-02-19
Attending: SPECIALIST
Payer: MEDICARE

## 2021-02-19 DIAGNOSIS — R94.31 ABNORMAL ELECTROCARDIOGRAM: ICD-10-CM

## 2021-02-19 DIAGNOSIS — I10 ESSENTIAL (PRIMARY) HYPERTENSION: ICD-10-CM

## 2021-02-19 DIAGNOSIS — E78.2 MIXED HYPERLIPIDEMIA: ICD-10-CM

## 2021-02-19 DIAGNOSIS — R60.0 LOCALIZED EDEMA: ICD-10-CM

## 2021-02-19 DIAGNOSIS — I77.810 THORACIC AORTIC ECTASIA: ICD-10-CM

## 2021-02-19 DIAGNOSIS — E11.65 TYPE 2 DIABETES MELLITUS WITH HYPERGLYCEMIA: ICD-10-CM

## 2021-02-19 LAB
CREAT SERPL-MCNC: 0.8 MG/DL (ref 0.5–1.4)
SAMPLE: NORMAL

## 2021-02-19 PROCEDURE — 82565 ASSAY OF CREATININE: CPT | Mod: PO

## 2021-02-19 PROCEDURE — 25500020 PHARM REV CODE 255: Mod: PO | Performed by: SPECIALIST

## 2021-02-19 PROCEDURE — 71275 CT ANGIOGRAPHY CHEST: CPT | Mod: TC,PO

## 2021-02-19 RX ADMIN — IOHEXOL 100 ML: 350 INJECTION, SOLUTION INTRAVENOUS at 09:02

## 2021-03-02 ENCOUNTER — OFFICE VISIT (OUTPATIENT)
Dept: UROLOGY | Facility: CLINIC | Age: 71
End: 2021-03-02
Payer: MEDICARE

## 2021-03-02 VITALS
OXYGEN SATURATION: 96 % | DIASTOLIC BLOOD PRESSURE: 71 MMHG | WEIGHT: 270 LBS | TEMPERATURE: 98 F | HEIGHT: 69 IN | HEART RATE: 58 BPM | SYSTOLIC BLOOD PRESSURE: 141 MMHG | RESPIRATION RATE: 16 BRPM | BODY MASS INDEX: 39.99 KG/M2

## 2021-03-02 DIAGNOSIS — I10 ESSENTIAL HYPERTENSION: ICD-10-CM

## 2021-03-02 DIAGNOSIS — E66.9 OBESITY, UNSPECIFIED CLASSIFICATION, UNSPECIFIED OBESITY TYPE, UNSPECIFIED WHETHER SERIOUS COMORBIDITY PRESENT: ICD-10-CM

## 2021-03-02 DIAGNOSIS — E88.810 DYSMETABOLIC SYNDROME: ICD-10-CM

## 2021-03-02 DIAGNOSIS — N20.0 NEPHROLITHIASIS: ICD-10-CM

## 2021-03-02 DIAGNOSIS — N40.0 BPH WITHOUT OBSTRUCTION/LOWER URINARY TRACT SYMPTOMS: Primary | ICD-10-CM

## 2021-03-02 DIAGNOSIS — N25.1: ICD-10-CM

## 2021-03-02 LAB
BILIRUB SERPL-MCNC: NORMAL MG/DL
BLOOD URINE, POC: NORMAL
CLARITY, POC UA: CLEAR
COLOR, POC UA: YELLOW
GLUCOSE UR QL STRIP: NORMAL
KETONES UR QL STRIP: NORMAL
LEUKOCYTE ESTERASE URINE, POC: NORMAL
NITRITE, POC UA: NORMAL
PH, POC UA: 7.5
PROTEIN, POC: NORMAL
SPECIFIC GRAVITY, POC UA: 1.01
UROBILINOGEN, POC UA: 0.2

## 2021-03-02 PROCEDURE — 99213 PR OFFICE/OUTPT VISIT, EST, LEVL III, 20-29 MIN: ICD-10-PCS | Mod: S$PBB,,, | Performed by: STUDENT IN AN ORGANIZED HEALTH CARE EDUCATION/TRAINING PROGRAM

## 2021-03-02 PROCEDURE — 99213 OFFICE O/P EST LOW 20 MIN: CPT | Mod: S$PBB,,, | Performed by: STUDENT IN AN ORGANIZED HEALTH CARE EDUCATION/TRAINING PROGRAM

## 2021-03-02 PROCEDURE — 99213 OFFICE O/P EST LOW 20 MIN: CPT | Mod: PBBFAC | Performed by: STUDENT IN AN ORGANIZED HEALTH CARE EDUCATION/TRAINING PROGRAM

## 2021-03-02 PROCEDURE — 81002 URINALYSIS NONAUTO W/O SCOPE: CPT | Mod: PBBFAC | Performed by: STUDENT IN AN ORGANIZED HEALTH CARE EDUCATION/TRAINING PROGRAM

## 2021-03-02 PROCEDURE — 99999 PR PBB SHADOW E&M-EST. PATIENT-LVL III: ICD-10-PCS | Mod: PBBFAC,,, | Performed by: STUDENT IN AN ORGANIZED HEALTH CARE EDUCATION/TRAINING PROGRAM

## 2021-03-02 PROCEDURE — 99999 PR PBB SHADOW E&M-EST. PATIENT-LVL III: CPT | Mod: PBBFAC,,, | Performed by: STUDENT IN AN ORGANIZED HEALTH CARE EDUCATION/TRAINING PROGRAM

## 2021-03-02 RX ORDER — ASCORBIC ACID, TOCOPHERYL ACID SUCCINATE, THIAMINE, RIBOFLAVIN, NIACINAMIDE, PYRIDOXINE, FOLIC ACID, COBALAMIN, BIOTIN, PANTOTHENIC ACID, ZINC, SELENIUM 100; 1.5; 1.7; 20; 25; 3; 1; 300; 10; 15; 30; 7 MG/1; MG/1; MG/1; MG/1; MG/1; MG/1; MG/1; UG/1; MG/1; MG/1; [IU]/1; UG/1
TABLET, COATED ORAL
COMMUNITY
End: 2023-04-27

## 2021-03-02 RX ORDER — B-COMPLEX WITH VITAMIN C
1 TABLET ORAL DAILY
COMMUNITY
End: 2023-04-27 | Stop reason: ALTCHOICE

## 2021-03-02 RX ORDER — ATORVASTATIN CALCIUM 20 MG/1
20 TABLET, FILM COATED ORAL DAILY
COMMUNITY

## 2021-10-01 RX ORDER — CALCIUM CITRATE MALATE/VIT D3 250 MG-2.5
1 TABLET ORAL
COMMUNITY

## 2021-10-01 RX ORDER — PROPRANOLOL HYDROCHLORIDE 60 MG/1
TABLET ORAL
COMMUNITY
End: 2022-01-10 | Stop reason: ALTCHOICE

## 2021-10-01 RX ORDER — NAPROXEN 500 MG/1
TABLET ORAL
COMMUNITY
End: 2023-04-27

## 2021-10-01 RX ORDER — PANCRELIPASE LIPASE, PANCRELIPASE AMYLASE, AND PANCRELIPASE PROTEASE 21000; 83900; 54700 [USP'U]/1; [USP'U]/1; [USP'U]/1
CAPSULE, DELAYED RELEASE ORAL
COMMUNITY
End: 2023-04-27

## 2021-10-01 RX ORDER — DUTASTERIDE AND TAMSULOSIN HYDROCHLORIDE CAPSULES .5; .4 MG/1; MG/1
CAPSULE ORAL
COMMUNITY
End: 2022-01-10 | Stop reason: ALTCHOICE

## 2021-10-01 RX ORDER — FUROSEMIDE 40 MG/4ML
SOLUTION ORAL
COMMUNITY
End: 2022-01-10 | Stop reason: ALTCHOICE

## 2021-10-04 ENCOUNTER — OFFICE VISIT (OUTPATIENT)
Dept: PULMONOLOGY | Facility: CLINIC | Age: 71
End: 2021-10-04
Payer: MEDICARE

## 2021-10-04 VITALS
BODY MASS INDEX: 38.54 KG/M2 | WEIGHT: 261 LBS | OXYGEN SATURATION: 98 % | DIASTOLIC BLOOD PRESSURE: 60 MMHG | HEART RATE: 56 BPM | SYSTOLIC BLOOD PRESSURE: 144 MMHG

## 2021-10-04 DIAGNOSIS — R06.09 CHRONIC DYSPNEA: ICD-10-CM

## 2021-10-04 DIAGNOSIS — I50.9 CONGESTIVE HEART FAILURE, UNSPECIFIED HF CHRONICITY, UNSPECIFIED HEART FAILURE TYPE: ICD-10-CM

## 2021-10-04 DIAGNOSIS — E66.9 OBESITY, UNSPECIFIED CLASSIFICATION, UNSPECIFIED OBESITY TYPE, UNSPECIFIED WHETHER SERIOUS COMORBIDITY PRESENT: ICD-10-CM

## 2021-10-04 DIAGNOSIS — R06.00 DYSPNEA, UNSPECIFIED TYPE: ICD-10-CM

## 2021-10-04 DIAGNOSIS — J96.11 CHRONIC RESPIRATORY FAILURE WITH HYPOXIA: Primary | ICD-10-CM

## 2021-10-04 PROCEDURE — 99204 PR OFFICE/OUTPT VISIT, NEW, LEVL IV, 45-59 MIN: ICD-10-PCS | Mod: S$GLB,,, | Performed by: INTERNAL MEDICINE

## 2021-10-04 PROCEDURE — 99204 OFFICE O/P NEW MOD 45 MIN: CPT | Mod: S$GLB,,, | Performed by: INTERNAL MEDICINE

## 2021-10-04 RX ORDER — IPRATROPIUM BROMIDE 0.5 MG/2.5ML
500 SOLUTION RESPIRATORY (INHALATION) 4 TIMES DAILY
COMMUNITY
End: 2022-07-20 | Stop reason: ALTCHOICE

## 2021-10-19 ENCOUNTER — HOSPITAL ENCOUNTER (OUTPATIENT)
Dept: PULMONOLOGY | Facility: HOSPITAL | Age: 71
Discharge: HOME | End: 2021-10-19
Attending: INTERNAL MEDICINE
Payer: MEDICARE

## 2021-10-19 ENCOUNTER — HOSPITAL ENCOUNTER (OUTPATIENT)
Dept: RADIOLOGY | Facility: HOSPITAL | Age: 71
Discharge: HOME | End: 2021-10-19
Attending: INTERNAL MEDICINE
Payer: MEDICARE

## 2021-10-19 VITALS — BODY MASS INDEX: 38.66 KG/M2 | HEIGHT: 69 IN | WEIGHT: 261 LBS

## 2021-10-19 DIAGNOSIS — J96.11 CHRONIC RESPIRATORY FAILURE WITH HYPOXIA: ICD-10-CM

## 2021-10-19 DIAGNOSIS — R06.00 DYSPNEA, UNSPECIFIED TYPE: ICD-10-CM

## 2021-10-19 PROCEDURE — 71046 X-RAY EXAM CHEST 2 VIEWS: CPT | Mod: TC

## 2021-10-19 PROCEDURE — 94060 EVALUATION OF WHEEZING: CPT

## 2021-10-19 PROCEDURE — 94010 BREATHING CAPACITY TEST: CPT | Mod: XB

## 2021-10-19 PROCEDURE — 94727 GAS DIL/WSHOT DETER LNG VOL: CPT

## 2021-10-19 PROCEDURE — 94618 PULMONARY STRESS TESTING: CPT

## 2021-10-19 PROCEDURE — 94729 DIFFUSING CAPACITY: CPT

## 2021-11-01 ENCOUNTER — OFFICE VISIT (OUTPATIENT)
Dept: PULMONOLOGY | Facility: CLINIC | Age: 71
End: 2021-11-01
Payer: MEDICARE

## 2021-11-01 VITALS
SYSTOLIC BLOOD PRESSURE: 150 MMHG | HEART RATE: 78 BPM | WEIGHT: 243 LBS | DIASTOLIC BLOOD PRESSURE: 82 MMHG | BODY MASS INDEX: 35.88 KG/M2 | OXYGEN SATURATION: 96 %

## 2021-11-01 DIAGNOSIS — R06.00 DYSPNEA, UNSPECIFIED TYPE: ICD-10-CM

## 2021-11-01 DIAGNOSIS — J96.11 CHRONIC RESPIRATORY FAILURE WITH HYPOXIA: Primary | ICD-10-CM

## 2021-11-01 DIAGNOSIS — E66.9 OBESITY, UNSPECIFIED CLASSIFICATION, UNSPECIFIED OBESITY TYPE, UNSPECIFIED WHETHER SERIOUS COMORBIDITY PRESENT: ICD-10-CM

## 2021-11-01 DIAGNOSIS — I50.9 CONGESTIVE HEART FAILURE, UNSPECIFIED HF CHRONICITY, UNSPECIFIED HEART FAILURE TYPE: ICD-10-CM

## 2021-11-01 PROCEDURE — G0008 ADMIN INFLUENZA VIRUS VAC: HCPCS | Mod: S$GLB,,, | Performed by: INTERNAL MEDICINE

## 2021-11-01 PROCEDURE — G0008 FLU VACCINE - QUADRIVALENT - HIGH DOSE (65+) PRESERVATIVE FREE IM: ICD-10-PCS | Mod: S$GLB,,, | Performed by: INTERNAL MEDICINE

## 2021-11-01 PROCEDURE — 90662 IIV NO PRSV INCREASED AG IM: CPT | Mod: S$GLB,,, | Performed by: INTERNAL MEDICINE

## 2021-11-01 PROCEDURE — 99214 PR OFFICE/OUTPT VISIT, EST, LEVL IV, 30-39 MIN: ICD-10-PCS | Mod: 25,S$GLB,, | Performed by: INTERNAL MEDICINE

## 2021-11-01 PROCEDURE — 99214 OFFICE O/P EST MOD 30 MIN: CPT | Mod: 25,S$GLB,, | Performed by: INTERNAL MEDICINE

## 2021-11-01 PROCEDURE — 90662 FLU VACCINE - QUADRIVALENT - HIGH DOSE (65+) PRESERVATIVE FREE IM: ICD-10-PCS | Mod: S$GLB,,, | Performed by: INTERNAL MEDICINE

## 2021-11-01 RX ORDER — AZITHROMYCIN 250 MG/1
TABLET, FILM COATED ORAL
COMMUNITY
Start: 2021-07-19 | End: 2022-01-10 | Stop reason: ALTCHOICE

## 2021-11-01 RX ORDER — CHOLECALCIFEROL (VITAMIN D3) 25 MCG
1000 TABLET ORAL
COMMUNITY
End: 2023-04-27

## 2021-11-01 RX ORDER — MAGNESIUM GLUCONATE 27.5 (500)
500 TABLET ORAL
COMMUNITY
End: 2023-04-27

## 2021-11-01 RX ORDER — IPRATROPIUM BROMIDE AND ALBUTEROL SULFATE 2.5; .5 MG/3ML; MG/3ML
SOLUTION RESPIRATORY (INHALATION) EVERY 4 HOURS PRN
COMMUNITY
Start: 2021-07-20 | End: 2022-07-20 | Stop reason: ALTCHOICE

## 2021-11-01 RX ORDER — TAMSULOSIN HYDROCHLORIDE 0.4 MG/1
1 CAPSULE ORAL NIGHTLY
COMMUNITY
Start: 2021-09-19

## 2021-11-01 RX ORDER — DIAZEPAM 2 MG/1
2 TABLET ORAL DAILY PRN
COMMUNITY
Start: 2021-08-11 | End: 2022-01-10 | Stop reason: ALTCHOICE

## 2021-11-01 RX ORDER — MINERAL OIL
180 ENEMA (ML) RECTAL
COMMUNITY
End: 2022-07-20 | Stop reason: ALTCHOICE

## 2021-11-01 RX ORDER — INDAPAMIDE 2.5 MG/1
2.5 TABLET ORAL DAILY
COMMUNITY
Start: 2021-10-28

## 2021-11-01 RX ORDER — METHYLPREDNISOLONE 4 MG/1
TABLET ORAL
COMMUNITY
Start: 2021-08-03 | End: 2022-01-10 | Stop reason: ALTCHOICE

## 2021-11-01 RX ORDER — CIPROFLOXACIN AND DEXAMETHASONE 3; 1 MG/ML; MG/ML
SUSPENSION/ DROPS AURICULAR (OTIC)
COMMUNITY
Start: 2021-06-03 | End: 2022-01-10 | Stop reason: ALTCHOICE

## 2021-11-01 RX ORDER — SPIRONOLACTONE 25 MG/1
25 TABLET ORAL DAILY
COMMUNITY
Start: 2021-10-28

## 2021-11-01 RX ORDER — CIPROFLOXACIN 500 MG/1
500 TABLET ORAL 2 TIMES DAILY
Status: ON HOLD | COMMUNITY
Start: 2021-10-27 | End: 2022-01-19 | Stop reason: HOSPADM

## 2021-11-01 RX ORDER — AMOXICILLIN AND CLAVULANATE POTASSIUM 875; 125 MG/1; MG/1
1 TABLET, FILM COATED ORAL 2 TIMES DAILY
COMMUNITY
Start: 2021-06-03 | End: 2022-01-10 | Stop reason: ALTCHOICE

## 2021-11-09 DIAGNOSIS — R31.9 HEMATURIA, UNSPECIFIED: Primary | ICD-10-CM

## 2021-11-12 ENCOUNTER — HOSPITAL ENCOUNTER (OUTPATIENT)
Dept: RADIOLOGY | Facility: HOSPITAL | Age: 71
Discharge: HOME | End: 2021-11-12
Attending: FAMILY MEDICINE
Payer: MEDICARE

## 2021-11-12 DIAGNOSIS — R31.9 HEMATURIA, UNSPECIFIED: ICD-10-CM

## 2021-11-12 PROCEDURE — 25500020 PHARM REV CODE 255

## 2021-11-12 PROCEDURE — 74400 UROGRAPHY IV +-KUB TOMOG: CPT | Mod: TC

## 2021-11-12 RX ADMIN — IOHEXOL 100 ML: 300 INJECTION, SOLUTION INTRAVENOUS at 10:11

## 2021-12-31 ENCOUNTER — OFFICE VISIT (OUTPATIENT)
Dept: URGENT CARE | Facility: CLINIC | Age: 71
End: 2021-12-31
Payer: MEDICARE

## 2021-12-31 VITALS
RESPIRATION RATE: 18 BRPM | BODY MASS INDEX: 35.99 KG/M2 | TEMPERATURE: 98 F | HEART RATE: 52 BPM | SYSTOLIC BLOOD PRESSURE: 157 MMHG | OXYGEN SATURATION: 95 % | DIASTOLIC BLOOD PRESSURE: 72 MMHG | HEIGHT: 69 IN | WEIGHT: 243 LBS

## 2021-12-31 DIAGNOSIS — Z20.822 LAB TEST NEGATIVE FOR COVID-19 VIRUS: ICD-10-CM

## 2021-12-31 DIAGNOSIS — Z20.822 CLOSE EXPOSURE TO COVID-19 VIRUS: Primary | ICD-10-CM

## 2021-12-31 LAB
CTP QC/QA: YES
SARS-COV-2 AG RESP QL IA.RAPID: NEGATIVE

## 2021-12-31 PROCEDURE — 99203 OFFICE O/P NEW LOW 30 MIN: CPT | Mod: S$GLB,,, | Performed by: STUDENT IN AN ORGANIZED HEALTH CARE EDUCATION/TRAINING PROGRAM

## 2021-12-31 PROCEDURE — 99203 PR OFFICE/OUTPT VISIT, NEW, LEVL III, 30-44 MIN: ICD-10-PCS | Mod: S$GLB,,, | Performed by: STUDENT IN AN ORGANIZED HEALTH CARE EDUCATION/TRAINING PROGRAM

## 2021-12-31 PROCEDURE — 87811 SARS CORONAVIRUS 2 ANTIGEN POCT, MANUAL READ: ICD-10-PCS | Mod: S$GLB,,, | Performed by: STUDENT IN AN ORGANIZED HEALTH CARE EDUCATION/TRAINING PROGRAM

## 2021-12-31 PROCEDURE — 87811 SARS-COV-2 COVID19 W/OPTIC: CPT | Mod: S$GLB,,, | Performed by: STUDENT IN AN ORGANIZED HEALTH CARE EDUCATION/TRAINING PROGRAM

## 2022-01-09 NOTE — PROGRESS NOTES
[facility] Note    PCP: Josee Blankenship MD    Chief Complaint: BPH    SUBJECTIVE:       History of Present Illness:  Srinivasa White is a 71 y.o. male who presents to clinic for BPH. He is Established  to our clinic.     Patient had episode of gross hematuria approximately 6 months ago for which a IVP was ordered. This showed possible bilateral renal pelvis dilation, however it appears this may be extra renal pelvices and visualization is difficult secondary to his very large stool burden. Kidneys appear to drain well.     He is without complaints today. No recent hematuria. No dysuria.     UA today negative   PVR 0 cc    3/1/21  Previous patient of Dr. Orlando. Underwent laser TURP in 10/2015 and noted improvement in his symptoms following this.   Has some dribbling. Rarely has a weak stream. Feels like he empties well. No significant frequency or urgency. No incontinence.   No hematuria. Mild dysuria with orange juice.   Not on any bladder medications.     Also has hx of diabetes insipida for which he was being treated with Desmopressin by his nephrology however has stopped this. Since he got COVID in October, he has had to sleep in a recliner and since then has not had as much of an issue with nocturia.     Hx of stones many years ago.     UA today: negative for blood, leuks, nitrite   PVR today: 0 cc    Last urine culture: no documented UTIs  Last creatinine: 0.8 (9/21/18)  Last PSA: 0.69 (1/29/20)    Family  hx: no malignancy   Hx of HTN, obesity    Past medical, family, and social history reviewed as documented in chart with pertinent positive medical, family, and social history detailed in HPI.    Review of patient's allergies indicates:   Allergen Reactions    Fvfecqib-qtwcwctsjcb-fvzabhipe      Per pt, causes blood in urine.        Past Medical History:   Diagnosis Date    Arthritis     BPH (benign prostatic hyperplasia)     H/O colonoscopy 2005    Polyps removed     "Hypertension      Past Surgical History:   Procedure Laterality Date    APPENDECTOMY  1974    LASER OF PROSTATE W/ GREEN LIGHT PVP  10/20/15    left leg Schrap medat  1970    TONSILLECTOMY  1958    WISDOM TOOTH EXTRACTION  1964     History reviewed. No pertinent family history.  Social History     Tobacco Use    Smoking status: Former Smoker     Types: Cigarettes    Smokeless tobacco: Never Used   Substance Use Topics    Alcohol use: Yes     Alcohol/week: 0.0 standard drinks     Comment: occasional beer or wine monthly    Drug use: No        Review of Systems   Genitourinary: Positive for nocturia. Negative for difficulty urinating, dysuria, frequency, hematuria, scrotal swelling, testicular pain and urgency.       OBJECTIVE:     Anticoagulation: aspirin 81 mg    Estimated body mass index is 37.02 kg/m² as calculated from the following:    Height as of this encounter: 5' 9" (1.753 m).    Weight as of this encounter: 113.7 kg (250 lb 10.6 oz).    Vital Signs (Most Recent)  Pulse: 61 (01/10/22 1347)  Resp: 18 (01/10/22 1347)  BP: (!) 159/76 (01/10/22 1347)    Physical Exam  Constitutional:       General: He is not in acute distress.     Appearance: Normal appearance. He is obese. He is not ill-appearing.   HENT:      Head: Normocephalic and atraumatic.   Eyes:      General: No scleral icterus.  Cardiovascular:      Rate and Rhythm: Normal rate and regular rhythm.   Pulmonary:      Effort: Pulmonary effort is normal. No respiratory distress.   Abdominal:      General: There is no distension.   Skin:     Coloration: Skin is not jaundiced.   Neurological:      General: No focal deficit present.      Mental Status: He is alert and oriented to person, place, and time.   Psychiatric:         Mood and Affect: Mood normal.         Behavior: Behavior normal.         Thought Content: Thought content normal.         BMP  Lab Results   Component Value Date     09/14/2018    K 3.7 09/14/2018     09/14/2018    " CO2 28 09/14/2018    BUN 12 09/14/2018    CREATININE 0.8 09/21/2018    CALCIUM 9.2 09/14/2018    ANIONGAP 8 09/14/2018    ESTGFRAFRICA >60 09/21/2018    EGFRNONAA >60 09/21/2018       Lab Results   Component Value Date    WBC 6.30 02/22/2018    HGB 13.8 (L) 02/22/2018    HCT 41.4 02/22/2018    MCV 84 02/22/2018     02/22/2018       Lab Results   Component Value Date    PSADIAG 0.69 01/29/2020    PSATOTAL 0.62 06/04/2018    PSAFREE 0.41 06/04/2018       Imaging:  CT abd/pelvis 10/5/18:  1. Left adrenal myelolipoma.  2. Splenic hypodense lesion, possibly a hemangioma but overall indeterminate.  3. Prostatomegaly.  Correlate with PSA.  4. Coronary artery disease.    ASSESSMENT     1. Gross hematuria    2. BPH without obstruction/lower urinary tract symptoms    3. Essential hypertension    4. Obesity, unspecified classification, unspecified obesity type, unspecified whether serious comorbidity present    5. Pre-op testing        PLAN:     - Gross hematuria workup to include CT Urogram, urine cytology and cystoscopy  - Cysto scheduled for 1/19    Lise Parra MD

## 2022-01-09 NOTE — H&P (VIEW-ONLY)
[facility] Note    PCP: Josee Blankenship MD    Chief Complaint: BPH    SUBJECTIVE:       History of Present Illness:  Srinivasa White is a 71 y.o. male who presents to clinic for BPH. He is Established  to our clinic.     Patient had episode of gross hematuria approximately 6 months ago for which a IVP was ordered. This showed possible bilateral renal pelvis dilation, however it appears this may be extra renal pelvices and visualization is difficult secondary to his very large stool burden. Kidneys appear to drain well.     He is without complaints today. No recent hematuria. No dysuria.     UA today negative   PVR 0 cc    3/1/21  Previous patient of Dr. Orlando. Underwent laser TURP in 10/2015 and noted improvement in his symptoms following this.   Has some dribbling. Rarely has a weak stream. Feels like he empties well. No significant frequency or urgency. No incontinence.   No hematuria. Mild dysuria with orange juice.   Not on any bladder medications.     Also has hx of diabetes insipida for which he was being treated with Desmopressin by his nephrology however has stopped this. Since he got COVID in October, he has had to sleep in a recliner and since then has not had as much of an issue with nocturia.     Hx of stones many years ago.     UA today: negative for blood, leuks, nitrite   PVR today: 0 cc    Last urine culture: no documented UTIs  Last creatinine: 0.8 (9/21/18)  Last PSA: 0.69 (1/29/20)    Family  hx: no malignancy   Hx of HTN, obesity    Past medical, family, and social history reviewed as documented in chart with pertinent positive medical, family, and social history detailed in HPI.    Review of patient's allergies indicates:   Allergen Reactions    Ilhzlfxm-ogmedqwinjf-dwvztcfod      Per pt, causes blood in urine.        Past Medical History:   Diagnosis Date    Arthritis     BPH (benign prostatic hyperplasia)     H/O colonoscopy 2005    Polyps removed     "Hypertension      Past Surgical History:   Procedure Laterality Date    APPENDECTOMY  1974    LASER OF PROSTATE W/ GREEN LIGHT PVP  10/20/15    left leg Schrap medat  1970    TONSILLECTOMY  1958    WISDOM TOOTH EXTRACTION  1964     History reviewed. No pertinent family history.  Social History     Tobacco Use    Smoking status: Former Smoker     Types: Cigarettes    Smokeless tobacco: Never Used   Substance Use Topics    Alcohol use: Yes     Alcohol/week: 0.0 standard drinks     Comment: occasional beer or wine monthly    Drug use: No        Review of Systems   Genitourinary: Positive for nocturia. Negative for difficulty urinating, dysuria, frequency, hematuria, scrotal swelling, testicular pain and urgency.       OBJECTIVE:     Anticoagulation: aspirin 81 mg    Estimated body mass index is 37.02 kg/m² as calculated from the following:    Height as of this encounter: 5' 9" (1.753 m).    Weight as of this encounter: 113.7 kg (250 lb 10.6 oz).    Vital Signs (Most Recent)  Pulse: 61 (01/10/22 1347)  Resp: 18 (01/10/22 1347)  BP: (!) 159/76 (01/10/22 1347)    Physical Exam  Constitutional:       General: He is not in acute distress.     Appearance: Normal appearance. He is obese. He is not ill-appearing.   HENT:      Head: Normocephalic and atraumatic.   Eyes:      General: No scleral icterus.  Cardiovascular:      Rate and Rhythm: Normal rate and regular rhythm.   Pulmonary:      Effort: Pulmonary effort is normal. No respiratory distress.   Abdominal:      General: There is no distension.   Skin:     Coloration: Skin is not jaundiced.   Neurological:      General: No focal deficit present.      Mental Status: He is alert and oriented to person, place, and time.   Psychiatric:         Mood and Affect: Mood normal.         Behavior: Behavior normal.         Thought Content: Thought content normal.         BMP  Lab Results   Component Value Date     09/14/2018    K 3.7 09/14/2018     09/14/2018    " CO2 28 09/14/2018    BUN 12 09/14/2018    CREATININE 0.8 09/21/2018    CALCIUM 9.2 09/14/2018    ANIONGAP 8 09/14/2018    ESTGFRAFRICA >60 09/21/2018    EGFRNONAA >60 09/21/2018       Lab Results   Component Value Date    WBC 6.30 02/22/2018    HGB 13.8 (L) 02/22/2018    HCT 41.4 02/22/2018    MCV 84 02/22/2018     02/22/2018       Lab Results   Component Value Date    PSADIAG 0.69 01/29/2020    PSATOTAL 0.62 06/04/2018    PSAFREE 0.41 06/04/2018       Imaging:  CT abd/pelvis 10/5/18:  1. Left adrenal myelolipoma.  2. Splenic hypodense lesion, possibly a hemangioma but overall indeterminate.  3. Prostatomegaly.  Correlate with PSA.  4. Coronary artery disease.    ASSESSMENT     1. Gross hematuria    2. BPH without obstruction/lower urinary tract symptoms    3. Essential hypertension    4. Obesity, unspecified classification, unspecified obesity type, unspecified whether serious comorbidity present    5. Pre-op testing        PLAN:     - Gross hematuria workup to include CT Urogram, urine cytology and cystoscopy  - Cysto scheduled for 1/19    Lise Parra MD

## 2022-01-10 ENCOUNTER — OFFICE VISIT (OUTPATIENT)
Dept: UROLOGY | Facility: CLINIC | Age: 72
End: 2022-01-10
Payer: MEDICARE

## 2022-01-10 VITALS
HEIGHT: 69 IN | RESPIRATION RATE: 18 BRPM | BODY MASS INDEX: 37.13 KG/M2 | SYSTOLIC BLOOD PRESSURE: 159 MMHG | DIASTOLIC BLOOD PRESSURE: 76 MMHG | WEIGHT: 250.69 LBS | HEART RATE: 61 BPM

## 2022-01-10 DIAGNOSIS — N40.0 BPH WITHOUT OBSTRUCTION/LOWER URINARY TRACT SYMPTOMS: ICD-10-CM

## 2022-01-10 DIAGNOSIS — R31.0 GROSS HEMATURIA: Primary | ICD-10-CM

## 2022-01-10 DIAGNOSIS — Z01.818 PRE-OP TESTING: ICD-10-CM

## 2022-01-10 DIAGNOSIS — I10 ESSENTIAL HYPERTENSION: ICD-10-CM

## 2022-01-10 DIAGNOSIS — E66.9 OBESITY, UNSPECIFIED CLASSIFICATION, UNSPECIFIED OBESITY TYPE, UNSPECIFIED WHETHER SERIOUS COMORBIDITY PRESENT: ICD-10-CM

## 2022-01-10 LAB
BILIRUB SERPL-MCNC: NORMAL MG/DL
BLOOD URINE, POC: NORMAL
CLARITY, POC UA: CLEAR
COLOR, POC UA: YELLOW
GLUCOSE UR QL STRIP: NORMAL
KETONES UR QL STRIP: NORMAL
LEUKOCYTE ESTERASE URINE, POC: NORMAL
NITRITE, POC UA: NORMAL
PH, POC UA: 6
POC RESIDUAL URINE VOLUME: 0 ML (ref 0–100)
PROTEIN, POC: NORMAL
SPECIFIC GRAVITY, POC UA: 1.02
UROBILINOGEN, POC UA: 0.2

## 2022-01-10 PROCEDURE — 88112 CYTOPATH CELL ENHANCE TECH: CPT | Mod: 26,,, | Performed by: PATHOLOGY

## 2022-01-10 PROCEDURE — 81002 URINALYSIS NONAUTO W/O SCOPE: CPT | Mod: PBBFAC,PN | Performed by: STUDENT IN AN ORGANIZED HEALTH CARE EDUCATION/TRAINING PROGRAM

## 2022-01-10 PROCEDURE — 99999 PR PBB SHADOW E&M-EST. PATIENT-LVL V: ICD-10-PCS | Mod: PBBFAC,,, | Performed by: STUDENT IN AN ORGANIZED HEALTH CARE EDUCATION/TRAINING PROGRAM

## 2022-01-10 PROCEDURE — 99214 OFFICE O/P EST MOD 30 MIN: CPT | Mod: S$PBB,,, | Performed by: STUDENT IN AN ORGANIZED HEALTH CARE EDUCATION/TRAINING PROGRAM

## 2022-01-10 PROCEDURE — 88112 PR  CYTOPATH, CELL ENHANCE TECH: ICD-10-PCS | Mod: 26,,, | Performed by: PATHOLOGY

## 2022-01-10 PROCEDURE — 88112 CYTOPATH CELL ENHANCE TECH: CPT | Performed by: PATHOLOGY

## 2022-01-10 PROCEDURE — 99215 OFFICE O/P EST HI 40 MIN: CPT | Mod: PBBFAC,PN | Performed by: STUDENT IN AN ORGANIZED HEALTH CARE EDUCATION/TRAINING PROGRAM

## 2022-01-10 PROCEDURE — 99999 PR PBB SHADOW E&M-EST. PATIENT-LVL V: CPT | Mod: PBBFAC,,, | Performed by: STUDENT IN AN ORGANIZED HEALTH CARE EDUCATION/TRAINING PROGRAM

## 2022-01-10 PROCEDURE — 99214 PR OFFICE/OUTPT VISIT, EST, LEVL IV, 30-39 MIN: ICD-10-PCS | Mod: S$PBB,,, | Performed by: STUDENT IN AN ORGANIZED HEALTH CARE EDUCATION/TRAINING PROGRAM

## 2022-01-10 PROCEDURE — 51798 US URINE CAPACITY MEASURE: CPT | Mod: PBBFAC,PN | Performed by: STUDENT IN AN ORGANIZED HEALTH CARE EDUCATION/TRAINING PROGRAM

## 2022-01-10 RX ORDER — IRBESARTAN 300 MG/1
300 TABLET ORAL DAILY
COMMUNITY
Start: 2021-11-21 | End: 2022-01-23

## 2022-01-10 RX ORDER — CARVEDILOL 6.25 MG/1
TABLET ORAL
COMMUNITY
Start: 2022-01-03 | End: 2022-01-23

## 2022-01-12 LAB
FINAL PATHOLOGIC DIAGNOSIS: ABNORMAL
Lab: ABNORMAL

## 2022-01-13 ENCOUNTER — HOSPITAL ENCOUNTER (OUTPATIENT)
Dept: RADIOLOGY | Facility: HOSPITAL | Age: 72
Discharge: HOME | End: 2022-01-13
Attending: STUDENT IN AN ORGANIZED HEALTH CARE EDUCATION/TRAINING PROGRAM
Payer: MEDICARE

## 2022-01-13 DIAGNOSIS — R31.0 GROSS HEMATURIA: ICD-10-CM

## 2022-01-13 PROCEDURE — 74178 CT ABD&PLV WO CNTR FLWD CNTR: CPT | Mod: 26,,, | Performed by: RADIOLOGY

## 2022-01-13 PROCEDURE — 74178 CT UROGRAM ABD PELVIS W WO: ICD-10-PCS | Mod: 26,,, | Performed by: RADIOLOGY

## 2022-01-13 PROCEDURE — 25500020 PHARM REV CODE 255

## 2022-01-13 PROCEDURE — 74178 CT ABD&PLV WO CNTR FLWD CNTR: CPT | Mod: TC

## 2022-01-13 RX ADMIN — IOHEXOL 125 ML: 350 INJECTION, SOLUTION INTRAVENOUS at 12:01

## 2022-01-16 ENCOUNTER — LAB VISIT (OUTPATIENT)
Dept: PRIMARY CARE CLINIC | Facility: CLINIC | Age: 72
End: 2022-01-16
Payer: MEDICARE

## 2022-01-16 DIAGNOSIS — Z01.818 PRE-OP TESTING: ICD-10-CM

## 2022-01-16 PROCEDURE — U0003 INFECTIOUS AGENT DETECTION BY NUCLEIC ACID (DNA OR RNA); SEVERE ACUTE RESPIRATORY SYNDROME CORONAVIRUS 2 (SARS-COV-2) (CORONAVIRUS DISEASE [COVID-19]), AMPLIFIED PROBE TECHNIQUE, MAKING USE OF HIGH THROUGHPUT TECHNOLOGIES AS DESCRIBED BY CMS-2020-01-R: HCPCS | Performed by: STUDENT IN AN ORGANIZED HEALTH CARE EDUCATION/TRAINING PROGRAM

## 2022-01-16 PROCEDURE — U0005 INFEC AGEN DETEC AMPLI PROBE: HCPCS | Performed by: STUDENT IN AN ORGANIZED HEALTH CARE EDUCATION/TRAINING PROGRAM

## 2022-01-17 LAB
SARS-COV-2 RNA RESP QL NAA+PROBE: NOT DETECTED
SARS-COV-2- CYCLE NUMBER: NORMAL

## 2022-01-19 ENCOUNTER — HOSPITAL ENCOUNTER (OUTPATIENT)
Facility: AMBULARY SURGERY CENTER | Age: 72
Discharge: HOME | End: 2022-01-19
Attending: STUDENT IN AN ORGANIZED HEALTH CARE EDUCATION/TRAINING PROGRAM | Admitting: STUDENT IN AN ORGANIZED HEALTH CARE EDUCATION/TRAINING PROGRAM
Payer: MEDICARE

## 2022-01-19 VITALS
TEMPERATURE: 98 F | DIASTOLIC BLOOD PRESSURE: 70 MMHG | WEIGHT: 250.69 LBS | RESPIRATION RATE: 18 BRPM | OXYGEN SATURATION: 97 % | HEART RATE: 56 BPM | HEIGHT: 69 IN | BODY MASS INDEX: 37.13 KG/M2 | SYSTOLIC BLOOD PRESSURE: 141 MMHG

## 2022-01-19 DIAGNOSIS — R31.0 GROSS HEMATURIA: Primary | ICD-10-CM

## 2022-01-19 PROCEDURE — 52000 PR CYSTOURETHROSCOPY: ICD-10-PCS | Mod: ,,, | Performed by: STUDENT IN AN ORGANIZED HEALTH CARE EDUCATION/TRAINING PROGRAM

## 2022-01-19 PROCEDURE — 52000 CYSTOURETHROSCOPY: CPT | Mod: ,,, | Performed by: STUDENT IN AN ORGANIZED HEALTH CARE EDUCATION/TRAINING PROGRAM

## 2022-01-19 PROCEDURE — 52000 CYSTOURETHROSCOPY: CPT | Performed by: STUDENT IN AN ORGANIZED HEALTH CARE EDUCATION/TRAINING PROGRAM

## 2022-01-19 RX ORDER — SULFAMETHOXAZOLE AND TRIMETHOPRIM 800; 160 MG/1; MG/1
1 TABLET ORAL 2 TIMES DAILY
Qty: 2 TABLET | Refills: 0 | Status: SHIPPED | OUTPATIENT
Start: 2022-01-19 | End: 2022-01-20

## 2022-01-19 RX ORDER — LIDOCAINE HYDROCHLORIDE 20 MG/ML
JELLY TOPICAL
Status: DISCONTINUED | OUTPATIENT
Start: 2022-01-19 | End: 2022-01-19 | Stop reason: HOSPADM

## 2022-01-19 RX ORDER — FINASTERIDE 5 MG/1
5 TABLET, FILM COATED ORAL DAILY
Qty: 30 TABLET | Refills: 11 | Status: SHIPPED | OUTPATIENT
Start: 2022-01-19 | End: 2023-04-27

## 2022-01-19 RX ORDER — LIDOCAINE HYDROCHLORIDE 20 MG/ML
JELLY TOPICAL
Status: DISCONTINUED
Start: 2022-01-19 | End: 2022-01-19 | Stop reason: HOSPADM

## 2022-01-19 RX ORDER — WATER 1 ML/ML
IRRIGANT IRRIGATION
Status: DISCONTINUED | OUTPATIENT
Start: 2022-01-19 | End: 2022-01-19 | Stop reason: HOSPADM

## 2022-01-19 NOTE — PLAN OF CARE
Patient able to urinate after several attempts. Patient verbalized importance of drinking lots of fluids.

## 2022-01-19 NOTE — INTERVAL H&P NOTE
The patient has been examined and the H&P has been reviewed:    I concur with the findings and no changes have occurred since H&P was written.    Procedure risks, benefits and alternative options discussed and understood by patient/family.    Patient is appropriate for ASC.      There are no hospital problems to display for this patient.

## 2022-01-19 NOTE — DISCHARGE SUMMARY
[facility]  Discharge Note  Short Stay    Admit Date: 1/19/2022    Discharge Date and Time: 01/19/2022 10:08 AM      Attending Physician: Lise Parra MD     Discharge Provider: Lise Parra    Diagnoses:  Active Hospital Problems    Diagnosis  POA    *Gross hematuria [R31.0]  Yes    Frequency of urination and polyuria [R35.0, R35.89]  Yes    BPH without obstruction/lower urinary tract symptoms [N40.0]  Yes      Resolved Hospital Problems   No resolved problems to display.       Discharged Condition: good    Hospital Course: Patient was admitted for cysto and tolerated the procedure well with no complications. The patient was discharged home in good condition on the same day.       Final Diagnoses: Same as principal problem.    Disposition: Home or Self Care    Follow up/Patient Instructions:    Medications:  Reconciled Home Medications:   Current Discharge Medication List      START taking these medications    Details   finasteride (PROSCAR) 5 mg tablet Take 1 tablet (5 mg total) by mouth once daily.  Qty: 30 tablet, Refills: 11      sulfamethoxazole-trimethoprim 800-160mg (BACTRIM DS) 800-160 mg Tab Take 1 tablet by mouth 2 (two) times daily. for 1 day  Qty: 2 tablet, Refills: 0         CONTINUE these medications which have NOT CHANGED    Details   aspirin (ECOTRIN) 81 MG EC tablet Take 81 mg by mouth once daily.      bran/gum/fib/dee dee/psyl/kelp/pec (FIBER 6 ORAL) Take by mouth.      carvediloL (COREG) 6.25 MG tablet Take by mouth.      fexofenadine (ALLEGRA) 180 MG tablet Take 180 mg by mouth.      indapamide (LOZOL) 2.5 MG Tab       irbesartan (AVAPRO) 300 MG tablet Take 300 mg by mouth once daily.      LACTOBACILLUS ACIDOPHILUS (ACIDOPHILUS ORAL) Take by mouth.      lipase-protease-amylase (PANCREAZE) 21,000-54,700- 83,900 unit CpDR       spironolactone (ALDACTONE) 25 MG tablet       albuterol-ipratropium (DUO-NEB) 2.5 mg-0.5 mg/3 mL nebulizer solution Take by nebulization every 4  (four) hours as needed.      atorvastatin (LIPITOR) 20 MG tablet Take 20 mg by mouth once daily.      b complex vitamins capsule Take 1 capsule by mouth once daily.      B-complex with vitamin C (Z-BEC OR EQUIV) tablet Take 1 tablet by mouth once daily.      benzonatate (TESSALON) 100 MG capsule benzonatate 100 mg capsule      calcium citrate malate-vit D3 (OSCAL) 250 mg-2.5 mcg (100 unit) Tab Take 1 tablet by mouth.      cloNIDine 0.3 mg/24 hr td ptwk (CATAPRES) 0.3 mg/24 hr Place 1 patch onto the skin every 7 days.   Refills: 3      COCONUT OIL ORAL Take by mouth.      desmopressin (DDAVP) 0.1 MG Tab Take 1 tablet (100 mcg total) by mouth 2 (two) times daily.  Qty: 180 tablet, Refills: 3    Associated Diagnoses: Polyuria; Acquired nephrogenic diabetes insipidus      folic acid-B kdgh-F-litny-zinc (DIALYVITE 3000) 3-70-15 mg-mcg-mg Tab Take by mouth.      glucosamine-chondroitin 500-400 mg tablet Take 1 tablet by mouth 3 (three) times daily. 1500mg/MSM 1500mg      ipratropium (ATROVENT) 0.02 % nebulizer solution Take 500 mcg by nebulization 4 (four) times daily. Rescue      K-PHOS ORIGINAL 500 mg TbSO TAKE 1 TABLET BY MOUTH TWICE DAILY  Qty: 180 tablet, Refills: 3    Associated Diagnoses: Hypokalemia      Lactobacillus rhamnosus GG (CULTURELLE) 10 billion cell capsule Take 1 capsule by mouth once daily.      magnesium 250 mg Tab Take by mouth 2 (two) times daily.      magnesium gluconate 27.5 mg magne- sium (500 mg) Tab Take 500 mg by mouth.      naproxen (NAPROSYN) 500 MG tablet       omeprazole (PRILOSEC) 40 MG capsule Take 40 mg by mouth once daily.      polycarbophil (FIBERCON) 625 mg tablet Take by mouth.      tamsulosin (FLOMAX) 0.4 mg Cap Take 1 capsule by mouth every evening.      vitamin D (VITAMIN D3) 1000 units Tab Take 1,000 Units by mouth.         STOP taking these medications       ciprofloxacin HCl (CIPRO) 500 MG tablet Comments:   Reason for Stopping:             Discharge Procedure Orders   Call MD  for:  temperature >100.4     Activity as tolerated      Follow-up Information     Lise Parra MD In 6 months.    Specialty: Urology  Why: assess symptoms  Contact information:  [address] [place] [address] DRIVE  [address]  [facility] [address] [phone]                           Lise Parra MD  Urology Department

## 2022-01-19 NOTE — OP NOTE
[facility] [place]  Operative Note    Date: 01/19/2022    Pre-Op Diagnosis:   - Gross hematuria   - BPH    Patient Active Problem List   Diagnosis    BPH without obstruction/lower urinary tract symptoms    Essential hypertension    Frequency of urination and polyuria    Dysmetabolic syndrome    Obesity    Nephrolithiasis    Sleep deprivation    Hypovitaminosis D    Hyperaldosteronism    Acquired nephrogenic diabetes insipidus    Adrenal nodule    Gross hematuria       Post-Op Diagnosis: same    Procedure(s) Performed:   1.  Cystoscopy     Specimen(s): none     Staff Surgeon: Lise Parra MD    Anesthesia: Local anesthesia topical 2% lidocaine gel    Indications: Srinivasa White is a 71 y.o. male with gross hematuria. On CT Urogram he was noted to have a severely enlarged prostate, otherwise no concerning findings. He has minimal urinary complaints and his PVR in clinic was 0 cc.     Findings:   - very enlarged prostate with difficulty navigating into the bladder   - significant bleeding from the prostate noted   - difficult visualization within the bladder secondary to bleeding, even after draining the bladder, however no obvious tumors were seen  - severe trabeculations noted     Estimated Blood Loss: min    Procedure in Detail:  After risks, benefits and possible complications of cystoscopy were explained, the patient elected to undergo the procedure and informed consent was obtained. All questions were answered in the ebto-operative area. The patient was transferred to the cystoscopy suite and placed in the dorsal lithotomy position and prepped and draped in the usual sterile fashion.  Time out was performed.     A flexible cystoscope was introduced into the bladder per urethra. Findings as above.       The patient tolerated the procedure well and was transferred to recovery in stable condition.    Disposition:  The patient will follow up in 6 months. Start Finasteride and continue  Flomax. He has minimal urinary complaints and is emptying well. If hematuria continues we may need to consider outlet procedure.      Lise Parra MD

## 2022-01-20 NOTE — PROGRESS NOTES
"Pt states that he went to [facility] due to urinary retention yesterday, bladder distention was noted by MD, stern inserted, but most urine coming out around catheter and in "bowels." and very bloody.  Pt did not return home with catheter, but states that he is urinating without difficulty today and much clearer.  Message sent to Dr. Parra; pt aware.  "

## 2022-01-23 ENCOUNTER — OFFICE VISIT (OUTPATIENT)
Dept: URGENT CARE | Facility: CLINIC | Age: 72
End: 2022-01-23
Payer: MEDICARE

## 2022-01-23 VITALS
SYSTOLIC BLOOD PRESSURE: 124 MMHG | OXYGEN SATURATION: 94 % | HEART RATE: 87 BPM | BODY MASS INDEX: 36.48 KG/M2 | DIASTOLIC BLOOD PRESSURE: 64 MMHG | WEIGHT: 247 LBS | TEMPERATURE: 98 F

## 2022-01-23 DIAGNOSIS — Z20.822 LAB TEST NEGATIVE FOR COVID-19 VIRUS: ICD-10-CM

## 2022-01-23 DIAGNOSIS — R09.81 SINUS CONGESTION: ICD-10-CM

## 2022-01-23 DIAGNOSIS — J01.10 ACUTE FRONTAL SINUSITIS, RECURRENCE NOT SPECIFIED: Primary | ICD-10-CM

## 2022-01-23 LAB
CTP QC/QA: YES
SARS-COV-2 AG RESP QL IA.RAPID: NEGATIVE

## 2022-01-23 PROCEDURE — 99213 OFFICE O/P EST LOW 20 MIN: CPT | Mod: S$GLB,,, | Performed by: NURSE PRACTITIONER

## 2022-01-23 PROCEDURE — 99213 PR OFFICE/OUTPT VISIT, EST, LEVL III, 20-29 MIN: ICD-10-PCS | Mod: S$GLB,,, | Performed by: NURSE PRACTITIONER

## 2022-01-23 PROCEDURE — 87811 SARS CORONAVIRUS 2 ANTIGEN POCT, MANUAL READ: ICD-10-PCS | Mod: QW,CR,, | Performed by: NURSE PRACTITIONER

## 2022-01-23 PROCEDURE — 87811 SARS-COV-2 COVID19 W/OPTIC: CPT | Mod: QW,CR,, | Performed by: NURSE PRACTITIONER

## 2022-01-23 RX ORDER — METHYLPREDNISOLONE 4 MG/1
TABLET ORAL
Qty: 21 EACH | Refills: 0 | Status: SHIPPED | OUTPATIENT
Start: 2022-01-23 | End: 2022-02-13

## 2022-01-23 RX ORDER — DOXYCYCLINE HYCLATE 100 MG
100 TABLET ORAL 2 TIMES DAILY
Qty: 20 TABLET | Refills: 0 | Status: SHIPPED | OUTPATIENT
Start: 2022-01-23 | End: 2022-02-02

## 2022-01-23 NOTE — PROGRESS NOTES
Subjective:       Patient ID: Srinivasa White is a 71 y.o. male.    Vitals:  weight is 112 kg (247 lb). His temperature is 98.3 °F (36.8 °C). His blood pressure is 124/64 and his pulse is 87. His oxygen saturation is 94% (abnormal).     Chief Complaint: Sinus Problem    Pt presents to clinic with sinus issues.  Nasal congestion, facial pressure, headache which has been ongoing several days.  Began productive cough last night.  Denies recent fever\.    Sinus Problem  This is a new problem. The current episode started in the past 7 days. The problem is unchanged. There has been no fever. He is experiencing no pain. Associated symptoms include congestion, coughing, headaches, sinus pressure and sneezing. Pertinent negatives include no chills, ear pain, neck pain, shortness of breath or sore throat.       Constitution: Negative for chills and fever.   HENT: Positive for congestion, postnasal drip and sinus pressure. Negative for ear pain, ear discharge, sore throat and trouble swallowing.    Neck: Negative for neck pain, neck stiffness and painful lymph nodes.   Cardiovascular: Negative for chest pain and palpitations.   Eyes: Negative for eye itching and eyelid swelling.   Respiratory: Positive for cough. Negative for shortness of breath.    Gastrointestinal: Negative for nausea and vomiting.   Endocrine: cold intolerance and heat intolerance.   Genitourinary: Negative for dysuria and frequency.   Musculoskeletal: Negative for joint pain and muscle ache.   Skin: Negative for color change and rash.   Allergic/Immunologic: Positive for sneezing. Negative for itching.   Neurological: Positive for headaches. Negative for dizziness and altered mental status.   Hematologic/Lymphatic: Negative for swollen lymph nodes and easy bruising/bleeding. Does not bruise/bleed easily.   Psychiatric/Behavioral: Negative for altered mental status and confusion.       Objective:      Physical Exam   Constitutional: He is oriented to  person, place, and time. He appears well-developed and well-nourished. He is cooperative.  Non-toxic appearance. He does not have a sickly appearance. He does not appear ill. No distress.   HENT:   Head: Normocephalic and atraumatic.   Ears:   Right Ear: Hearing, tympanic membrane, external ear and ear canal normal.   Left Ear: Hearing, tympanic membrane, external ear and ear canal normal.   Nose: Nose normal. No mucosal edema, rhinorrhea or nasal deformity. No epistaxis. Right sinus exhibits no maxillary sinus tenderness and no frontal sinus tenderness. Left sinus exhibits no maxillary sinus tenderness and no frontal sinus tenderness.   Mouth/Throat: Uvula is midline, oropharynx is clear and moist and mucous membranes are normal. No trismus in the jaw. Normal dentition. No uvula swelling. No oropharyngeal exudate, posterior oropharyngeal edema or posterior oropharyngeal erythema.   Eyes: Conjunctivae and lids are normal. No scleral icterus.   Neck: Trachea normal and phonation normal. Neck supple. No edema present. No erythema present. No neck rigidity present.   Cardiovascular: Normal rate, regular rhythm, normal heart sounds, intact distal pulses and normal pulses.   Pulmonary/Chest: Effort normal and breath sounds normal. No respiratory distress. He has no decreased breath sounds. He has no rhonchi.   Abdominal: Normal appearance.   Musculoskeletal: Normal range of motion.         General: No deformity or edema. Normal range of motion.   Neurological: no focal deficit. He is alert and oriented to person, place, and time. He exhibits normal muscle tone. Coordination normal.   Skin: Skin is warm, dry, intact, not diaphoretic and not pale.   Psychiatric: He has a normal mood and affect. His speech is normal and behavior is normal. Mood, judgment and thought content normal. Cognition and memory  Nursing note and vitals reviewed.        Assessment:       1. Acute frontal sinusitis, recurrence not specified    2.  Sinus congestion    3. Lab test negative for COVID-19 virus          Plan:         Acute frontal sinusitis, recurrence not specified    Sinus congestion  -     SARS Coronavirus 2 Antigen, POCT Manual Read    Lab test negative for COVID-19 virus    Other orders  -     doxycycline (VIBRA-TABS) 100 MG tablet; Take 1 tablet (100 mg total) by mouth 2 (two) times daily. for 10 days  Dispense: 20 tablet; Refill: 0  -     methylPREDNISolone (MEDROL DOSEPACK) 4 mg tablet; use as directed  Dispense: 21 each; Refill: 0

## 2022-05-16 DIAGNOSIS — E05.20 TOXIC NODULAR GOITER: Primary | ICD-10-CM

## 2022-07-14 ENCOUNTER — HOSPITAL ENCOUNTER (OUTPATIENT)
Dept: RADIOLOGY | Facility: HOSPITAL | Age: 72
Discharge: HOME | End: 2022-07-14
Attending: INTERNAL MEDICINE
Payer: MEDICARE

## 2022-07-14 DIAGNOSIS — E05.20 TOXIC NODULAR GOITER: ICD-10-CM

## 2022-07-14 PROCEDURE — A9516 IODINE I-123 SOD IODIDE MIC: HCPCS

## 2022-07-18 NOTE — PROGRESS NOTES
[facility] Note    PCP: Josee Blankenship MD    Chief Complaint: BPH    SUBJECTIVE:       History of Present Illness:  Srinivasa White is a 72 y.o. male who presents to clinic for BPH. He is Established  to our clinic.     Previous cysto showed enlargement of the prostate with bleeding noted. He was started on Finasteride and has continued Flomax.   CT and cytology were negative.     No further gross hematuria.   Nocturia x 2. Sleeps in a recliner.   No bothersome complaints during the day. He feels as though he empties well.     1/10/22  Patient had episode of gross hematuria approximately 6 months ago for which a IVP was ordered. This showed possible bilateral renal pelvis dilation, however it appears this may be extra renal pelvices and visualization is difficult secondary to his very large stool burden. Kidneys appear to drain well.     He is without complaints today. No recent hematuria. No dysuria.     UA today negative   PVR 0 cc    3/1/21  Previous patient of Dr. Orlando. Underwent laser TURP in 10/2015 and noted improvement in his symptoms following this.   Has some dribbling. Rarely has a weak stream. Feels like he empties well. No significant frequency or urgency. No incontinence.   No hematuria. Mild dysuria with orange juice.   Not on any bladder medications.     Also has hx of diabetes insipida for which he was being treated with Desmopressin by his nephrology however has stopped this. Since he got COVID in October, he has had to sleep in a recliner and since then has not had as much of an issue with nocturia.     Hx of stones many years ago.     UA today: negative for blood, leuks, nitrite   PVR today: 0 cc    Last urine culture: no documented UTIs  Last creatinine: 0.8 (9/21/18)  Last PSA: 0.69 (1/29/20)    Family  hx: no malignancy   Hx of HTN, obesity    Past medical, family, and social history reviewed as documented in chart with pertinent positive medical, family, and  "social history detailed in HPI.    Review of patient's allergies indicates:   Allergen Reactions    Sejznyrn-hofzgpsogsg-olaciqalv      Per pt, causes blood in urine.        Past Medical History:   Diagnosis Date    Arthritis     BPH (benign prostatic hyperplasia)     H/O colonoscopy 2005    Polyps removed    Hypertension      Past Surgical History:   Procedure Laterality Date    APPENDECTOMY  1974    LASER OF PROSTATE W/ GREEN LIGHT PVP  10/20/15    left leg Schrap medat  1970    TONSILLECTOMY  1958    WISDOM TOOTH EXTRACTION  1964     No family history on file.  Social History     Tobacco Use    Smoking status: Former Smoker     Types: Cigarettes    Smokeless tobacco: Never Used   Substance Use Topics    Alcohol use: Yes     Alcohol/week: 0.0 standard drinks     Comment: occasional beer or wine monthly    Drug use: No        Review of Systems   Genitourinary: Positive for nocturia. Negative for difficulty urinating, dysuria, frequency, hematuria, scrotal swelling, testicular pain and urgency.       OBJECTIVE:     Anticoagulation: aspirin 81 mg    Estimated body mass index is 36.46 kg/m² as calculated from the following:    Height as of this encounter: 5' 9" (1.753 m).    Weight as of this encounter: 112 kg (246 lb 14.6 oz).    Vital Signs (Most Recent)  Pulse: 70 (07/20/22 1303)  Resp: 18 (07/20/22 1303)  BP: 121/64 (07/20/22 1303)    Physical Exam  Constitutional:       General: He is not in acute distress.     Appearance: Normal appearance. He is obese. He is not ill-appearing.   HENT:      Head: Normocephalic and atraumatic.   Eyes:      General: No scleral icterus.  Cardiovascular:      Rate and Rhythm: Normal rate and regular rhythm.   Pulmonary:      Effort: Pulmonary effort is normal. No respiratory distress.   Abdominal:      General: There is no distension.   Skin:     Coloration: Skin is not jaundiced.   Neurological:      General: No focal deficit present.      Mental Status: He is alert " and oriented to person, place, and time.   Psychiatric:         Mood and Affect: Mood normal.         Behavior: Behavior normal.         Thought Content: Thought content normal.         BMP  Lab Results   Component Value Date     09/14/2018    K 3.7 09/14/2018     09/14/2018    CO2 28 09/14/2018    BUN 12 09/14/2018    CREATININE 0.8 01/13/2022    CALCIUM 9.2 09/14/2018    ANIONGAP 8 09/14/2018    ESTGFRAFRICA >60 01/13/2022    EGFRNONAA >60 01/13/2022       Lab Results   Component Value Date    WBC 6.30 02/22/2018    HGB 13.8 (L) 02/22/2018    HCT 41.4 02/22/2018    MCV 84 02/22/2018     02/22/2018       Lab Results   Component Value Date    PSADIAG 0.69 01/29/2020    PSATOTAL 0.62 06/04/2018    PSAFREE 0.41 06/04/2018       Imaging:  CT abd/pelvis 10/5/18:  1. Left adrenal myelolipoma.  2. Splenic hypodense lesion, possibly a hemangioma but overall indeterminate.  3. Prostatomegaly.  Correlate with PSA.  4. Coronary artery disease.    ASSESSMENT     1. BPH without obstruction/lower urinary tract symptoms    2. Essential hypertension    3. Obesity, unspecified classification, unspecified obesity type, unspecified whether serious comorbidity present        PLAN:     - Doing well on current regimen  - No further hematuria   - Only complaint is nocturia. Recommended he hold fluid 2 hours prior to bed.   - Instructed to let me know if hematuria returns or LUTS worsen  - Follow up in 1 year     Lise Parra MD     I spent 30 minutes with the patient. Over 50% of the visit was spent in counseling.

## 2022-07-20 ENCOUNTER — OFFICE VISIT (OUTPATIENT)
Dept: UROLOGY | Facility: CLINIC | Age: 72
End: 2022-07-20
Payer: MEDICARE

## 2022-07-20 VITALS
SYSTOLIC BLOOD PRESSURE: 121 MMHG | DIASTOLIC BLOOD PRESSURE: 64 MMHG | HEART RATE: 70 BPM | HEIGHT: 69 IN | WEIGHT: 246.94 LBS | BODY MASS INDEX: 36.57 KG/M2 | RESPIRATION RATE: 18 BRPM

## 2022-07-20 DIAGNOSIS — E66.9 OBESITY, UNSPECIFIED CLASSIFICATION, UNSPECIFIED OBESITY TYPE, UNSPECIFIED WHETHER SERIOUS COMORBIDITY PRESENT: ICD-10-CM

## 2022-07-20 DIAGNOSIS — I10 ESSENTIAL HYPERTENSION: ICD-10-CM

## 2022-07-20 DIAGNOSIS — N40.0 BPH WITHOUT OBSTRUCTION/LOWER URINARY TRACT SYMPTOMS: Primary | ICD-10-CM

## 2022-07-20 PROCEDURE — 99213 OFFICE O/P EST LOW 20 MIN: CPT | Mod: PBBFAC,PN | Performed by: STUDENT IN AN ORGANIZED HEALTH CARE EDUCATION/TRAINING PROGRAM

## 2022-07-20 PROCEDURE — 99999 PR PBB SHADOW E&M-EST. PATIENT-LVL III: ICD-10-PCS | Mod: PBBFAC,,, | Performed by: STUDENT IN AN ORGANIZED HEALTH CARE EDUCATION/TRAINING PROGRAM

## 2022-07-20 PROCEDURE — 99999 PR PBB SHADOW E&M-EST. PATIENT-LVL III: CPT | Mod: PBBFAC,,, | Performed by: STUDENT IN AN ORGANIZED HEALTH CARE EDUCATION/TRAINING PROGRAM

## 2022-07-20 PROCEDURE — 99214 OFFICE O/P EST MOD 30 MIN: CPT | Mod: S$PBB,,, | Performed by: STUDENT IN AN ORGANIZED HEALTH CARE EDUCATION/TRAINING PROGRAM

## 2022-07-20 PROCEDURE — 99214 PR OFFICE/OUTPT VISIT, EST, LEVL IV, 30-39 MIN: ICD-10-PCS | Mod: S$PBB,,, | Performed by: STUDENT IN AN ORGANIZED HEALTH CARE EDUCATION/TRAINING PROGRAM

## 2022-07-20 RX ORDER — LORATADINE 10 MG/1
10 TABLET ORAL
Status: ON HOLD | COMMUNITY
End: 2023-05-23

## 2022-07-20 RX ORDER — DILTIAZEM HYDROCHLORIDE EXTENDED-RELEASE TABLETS 240 MG/1
240 TABLET, EXTENDED RELEASE ORAL DAILY
Status: ON HOLD | COMMUNITY
Start: 2022-07-13 | End: 2023-05-23

## 2022-07-20 RX ORDER — FELODIPINE 5 MG/1
5 TABLET, EXTENDED RELEASE ORAL DAILY
COMMUNITY
Start: 2022-06-06

## 2022-07-20 RX ORDER — OLMESARTAN MEDOXOMIL 40 MG/1
40 TABLET ORAL DAILY
COMMUNITY
Start: 2022-07-14

## 2022-07-21 DIAGNOSIS — E05.20 THYROTOXICOSIS WITH TOXIC MUTINODULAR THYROID GOITER: Primary | ICD-10-CM

## 2022-07-29 ENCOUNTER — HOSPITAL ENCOUNTER (OUTPATIENT)
Dept: RADIOLOGY | Facility: HOSPITAL | Age: 72
Discharge: HOME | End: 2022-07-29
Attending: INTERNAL MEDICINE
Payer: MEDICARE

## 2022-07-29 DIAGNOSIS — E05.20 THYROTOXICOSIS WITH TOXIC MUTINODULAR THYROID GOITER: ICD-10-CM

## 2022-07-29 PROCEDURE — 76536 US EXAM OF HEAD AND NECK: CPT | Mod: TC,PO

## 2022-11-01 ENCOUNTER — OFFICE VISIT (OUTPATIENT)
Dept: PULMONOLOGY | Facility: CLINIC | Age: 72
End: 2022-11-01
Payer: MEDICARE

## 2022-11-01 VITALS
BODY MASS INDEX: 39.13 KG/M2 | SYSTOLIC BLOOD PRESSURE: 140 MMHG | OXYGEN SATURATION: 96 % | HEART RATE: 73 BPM | WEIGHT: 265 LBS | DIASTOLIC BLOOD PRESSURE: 80 MMHG

## 2022-11-01 DIAGNOSIS — E66.9 OBESITY, UNSPECIFIED CLASSIFICATION, UNSPECIFIED OBESITY TYPE, UNSPECIFIED WHETHER SERIOUS COMORBIDITY PRESENT: Primary | ICD-10-CM

## 2022-11-01 DIAGNOSIS — I10 ESSENTIAL HYPERTENSION: ICD-10-CM

## 2022-11-01 DIAGNOSIS — G47.33 OBSTRUCTIVE SLEEP APNEA SYNDROME: ICD-10-CM

## 2022-11-01 PROCEDURE — G0008 ADMIN INFLUENZA VIRUS VAC: HCPCS | Mod: S$GLB,,, | Performed by: INTERNAL MEDICINE

## 2022-11-01 PROCEDURE — G0008 FLU VACCINE - QUADRIVALENT - HIGH DOSE (65+) PRESERVATIVE FREE IM: ICD-10-PCS | Mod: S$GLB,,, | Performed by: INTERNAL MEDICINE

## 2022-11-01 PROCEDURE — 99214 OFFICE O/P EST MOD 30 MIN: CPT | Mod: S$GLB,,, | Performed by: INTERNAL MEDICINE

## 2022-11-01 PROCEDURE — 90662 IIV NO PRSV INCREASED AG IM: CPT | Mod: S$GLB,,, | Performed by: INTERNAL MEDICINE

## 2022-11-01 PROCEDURE — 99214 PR OFFICE/OUTPT VISIT, EST, LEVL IV, 30-39 MIN: ICD-10-PCS | Mod: S$GLB,,, | Performed by: INTERNAL MEDICINE

## 2022-11-01 PROCEDURE — 90662 FLU VACCINE - QUADRIVALENT - HIGH DOSE (65+) PRESERVATIVE FREE IM: ICD-10-PCS | Mod: S$GLB,,, | Performed by: INTERNAL MEDICINE

## 2022-11-01 NOTE — PROGRESS NOTES
[facility]  Pulmonology  Follow-Up Clinic Visit    Subjective   Reason for Referral:    Srinivasa White is a 72 y.o. male referred by Dr. Zamora for evaluation of shortness of breath.    Chief Complaint   Patient presents with    Follow-up     Follow up chronic respiratory failure ; former dr puri patient ; Wants flu shot       10/4/2021:  Mr. Srinivasa White is a 71 year old gentleman with a history of CHF, HTN and prior COVID-19 who was in his usual state of health until several months ago when he developed exertional dyspnea.  3 months ago he was hospitalized with a CHF exacerbation.  His dypnea improved with IV diuretics and he was discharged home on oxygen.  He was referred her by his cardiologist;s NP.  He smoked 2 packs of cigarettes in the 1960's.  He is unaware of any intrinsic pulmonary disease.    He was in his usual state of health until 8 months ago, when he began experiencing dyspnea on exertion.  His symptoms were gradual in onset and stable since.  Symptoms occur while getting dressed.  Associated symptoms include lower extremity edema and orthopnea, but He denies any wheezing, sputum production, or pleurisy.  His symptoms are exacerbated by any exercise and improved with rest, oxygen and diuretics.   His weight has been stable, on diuretics.  His appetite has been unchanged.  The patient is having no constitutional symptoms, denying fever, chills, anorexia, or weight loss..  Work up so far has included CT chest, CXR and Echo.  Prior treatments include diuretics, which has provided some symptom releif.  He is currently prescribed lasix that he takes when he gains weight.  The patient reports adherence to this regimen.    He uses several pillows at night.   He currently is on oxygen at 2 L/min per nasal cannula..  He smoked 2 packs of cigarettes in his life.   He has (difficulty walking 50 yards on flat ground).  He has been hospitalized twice due to his breathing.    11/1/2021:  In pretty  well since our last visit from a respiratory perspective.  He was seen by his nephrologist who switched him from furosemide to indepamide.  He has been taking this and has noticed that his edema is been better.  He had a 6 minute walk test which she completed without oxygen and did not desaturate.  Overall he is relatively free from respiratory symptoms at this time.  He did recently have a urinary tract infection the presented with some cystitis and flank pain.  He is currently taking ciprofloxacin for a total of 10 days.  His symptoms associated with his urinary tract infection improved over the weekend.    11/1/2022 - Here for follow up, Feels he is doing pretty well overall.  He has weaned himself off of O2.  Prior PFT showed mild restriction and normal DLCO.  He has not been tested for sleep apnea - he does snore but denies observed apneas.  He denies EDS.  He has some chronic nasal congestion.  He had Covid in 2020.       Past Medical History:   Diagnosis Date    Arthritis     BPH (benign prostatic hyperplasia)     H/O colonoscopy 2005    Polyps removed    Hypertension      Past Surgical History:   Procedure Laterality Date    APPENDECTOMY  1974    LASER OF PROSTATE W/ GREEN LIGHT PVP  10/20/15    left leg Schrap medat  1970    TONSILLECTOMY  1958    WISDOM TOOTH EXTRACTION  1964     No family history on file.   Social History     Tobacco Use    Smoking status: Former     Types: Cigarettes    Smokeless tobacco: Never   Substance and Sexual Activity    Alcohol use: Yes     Alcohol/week: 0.0 standard drinks     Comment: occasional beer or wine monthly    Drug use: No    Sexual activity: Not on file      Allergies:   Eemhyhku-tmoxlhixufw-epkahledw     Outpatient Medications as of 11/1/2022   Medication    aspirin (ECOTRIN) 81 MG EC tablet    atorvastatin (LIPITOR) 20 MG tablet    b complex vitamins capsule    B-complex with vitamin C (Z-BEC OR EQUIV) tablet    bran/gum/fib/dee dee/psyl/kelp/pec (FIBER 6 ORAL)     calcium citrate malate-vit D3 (OSCAL) 250 mg-2.5 mcg (100 unit) Tab    cloNIDine 0.3 mg/24 hr td ptwk (CATAPRES) 0.3 mg/24 hr    COCONUT OIL ORAL    diltiaZEM HCl (CARDIZEM LA) 240 mg 24 hr tablet    felodipine (PLENDIL) 5 MG 24 hr tablet    finasteride (PROSCAR) 5 mg tablet    folic acid-B dglo-H-fkbhw-zinc (DIALYVITE 3000) 3-70-15 mg-mcg-mg Tab    glucosamine-chondroitin 500-400 mg tablet    indapamide (LOZOL) 2.5 MG Tab    Lactobacillus rhamnosus GG (CULTURELLE) 10 billion cell capsule    lipase-protease-amylase (PANCREAZE) 21,000-54,700- 83,900 unit CpDR    loratadine (CLARITIN) 10 mg tablet    magnesium gluconate 27.5 mg magne- sium (500 mg) Tab    naproxen (NAPROSYN) 500 MG tablet    olmesartan (BENICAR) 40 MG tablet    omeprazole (PRILOSEC) 40 MG capsule    polycarbophil (FIBERCON) 625 mg tablet    spironolactone (ALDACTONE) 25 MG tablet    tamsulosin (FLOMAX) 0.4 mg Cap    vitamin D (VITAMIN D3) 1000 units Tab    K-PHOS ORIGINAL 500 mg TbSO     No current facility-administered medications on file as of 11/1/2022.      Review of Systems   Constitutional:  Positive for weight loss and fatigue. Negative for fever, chills and night sweats.   HENT:  Negative for postnasal drip, rhinorrhea, sinus pressure and congestion.    Eyes:  Negative for redness and itching.   Respiratory:  Positive for cough, shortness of breath, wheezing, orthopnea and dyspnea on extertion.    Cardiovascular:  Positive for leg swelling. Negative for chest pain and palpitations.   Genitourinary:  Negative for difficulty urinating and hematuria.   Endocrine:  Negative for polydipsia, polyphagia and polyuria.    Musculoskeletal:  Negative for gait problem, joint swelling and myalgias.   Skin:  Negative for rash.   Gastrointestinal:  Negative for nausea, vomiting, abdominal pain and acid reflux.   Neurological:  Negative for syncope, weakness and headaches.   Hematological:  Negative for adenopathy. Does not bruise/bleed easily and no  excessive bruising.   Psychiatric/Behavioral:  Negative for confusion and sleep disturbance. The patient is not nervous/anxious.    All other systems reviewed and are negative.   Previous Reports Reviewed:   ER records, historical medical records, lab reports, nursing home notes, office notes, operative reports, radiology reports, referral letter/letters and x-ray reports   The following portions of the patient's history were reviewed and updated as appropriate: allergies, current medications, past family history, past medical history, past social history, past surgical history and problem list.    Objective:      BP (!) 140/80 (BP Location: Left arm, Patient Position: Sitting, BP Method: Medium (Manual))   Pulse 73   Wt 120.2 kg (265 lb)   SpO2 96%   BMI 39.13 kg/m²   Body mass index is 39.13 kg/m².     Physical Exam   Constitutional: He is oriented to person, place, and time. He appears well-developed and well-nourished. No distress.   HENT:   Head: Normocephalic.   Mouth/Throat: Oropharynx is clear and moist. Mallampati Score: III.   Neck: No thyromegaly present.   Cardiovascular: Normal rate, regular rhythm, normal heart sounds and intact distal pulses.   Pulmonary/Chest: Normal expansion, symmetric chest wall expansion and effort normal. He has no wheezes. He has no rhonchi. He has no rales.   Abdominal: Soft. Bowel sounds are normal. He exhibits no distension. There is no abdominal tenderness.   Musculoskeletal:         General: No tenderness, deformity or edema. Normal range of motion.      Cervical back: Normal range of motion and neck supple.   Lymphadenopathy: No supraclavicular adenopathy is present.     He has no cervical adenopathy.     He has no axillary adenopathy.   Neurological: He is alert and oriented to person, place, and time. No cranial nerve deficit.   Skin: Skin is warm and dry. No rash noted.   Psychiatric: He has a normal mood and affect.   Nursing note and vitals reviewed.   Personal  Review of Relevant Diagnostic Studies:  I have personally reviewed and interpreted the following labs/studies/images.  6MWT:  10/21/2021:  Distance:  Predicted:  443.2 m  Actual:  426.72 m  SpO2:  Rest:  96% on RA  Exercise:  97% on RA.      PFTs:  10/21/2021:  FVC:  2.37 959%)  FEV1:  1.92 (64%)  FEV1/FVC:  81  T.91 (71%)  DLCO:  16.48 (67%)  My impression.  No obstruction.  Mild restriction.  Mild diffusion impairment.    CXR:  10/19/2021:  Blunting of the costophrenic angles, right greater than left are consistent with small pleural effusions    BMP:  2021:  140 / 3.7 / 104 / 28 / 13 / 0.74 / 134  D-dimer:  <200 (normal)    NT-proBNP:  2021:  522    CTA chest:  2021:  My impression:  Small filling defect in a subsegmental artery that looks more like a flow void.  I am unconvinced that it represent a clot.  1.  Small focal peripheral filling defect in a subsegmental artery supplying the lateral segment of the right middle lobe likely reflects a chronic pulmonary embolism.  2.  The thoracic aorta is normal caliber with mild atherosclerosis    Assessment:       1. Obesity, unspecified classification, unspecified obesity type, unspecified whether serious comorbidity present    2. Essential hypertension    3. Obstructive sleep apnea syndrome          Plan:     Continue diuretics at the direction of his nephrologist and cardiologist.  O2 has been stopped  Continue BB and antihypertensive.  Will check home sleep study to check for STEPHANIE          Orders Placed This Visit:  No orders of the defined types were placed in this encounter.        Jorgito Kent MD  [facility] Pulmonary/Critical Care  2022

## 2022-11-07 ENCOUNTER — PROCEDURE VISIT (OUTPATIENT)
Dept: SLEEP MEDICINE | Facility: HOSPITAL | Age: 72
End: 2022-11-07
Attending: INTERNAL MEDICINE
Payer: MEDICARE

## 2022-11-07 DIAGNOSIS — G47.33 OBSTRUCTIVE SLEEP APNEA SYNDROME: ICD-10-CM

## 2022-11-07 DIAGNOSIS — E66.9 OBESITY, UNSPECIFIED CLASSIFICATION, UNSPECIFIED OBESITY TYPE, UNSPECIFIED WHETHER SERIOUS COMORBIDITY PRESENT: ICD-10-CM

## 2022-11-07 DIAGNOSIS — I10 ESSENTIAL HYPERTENSION: ICD-10-CM

## 2022-11-07 PROCEDURE — 95806 SLEEP STUDY UNATT&RESP EFFT: CPT

## 2023-02-28 DIAGNOSIS — E04.2 NONTOXIC MULTINODULAR GOITER: Primary | ICD-10-CM

## 2023-03-15 ENCOUNTER — HOSPITAL ENCOUNTER (OUTPATIENT)
Dept: RADIOLOGY | Facility: HOSPITAL | Age: 73
Discharge: HOME | End: 2023-03-15
Attending: INTERNAL MEDICINE
Payer: MEDICARE

## 2023-03-15 DIAGNOSIS — E04.2 NONTOXIC MULTINODULAR GOITER: ICD-10-CM

## 2023-03-15 PROCEDURE — 76536 US EXAM OF HEAD AND NECK: CPT | Mod: TC,PO

## 2023-04-27 ENCOUNTER — TELEPHONE (OUTPATIENT)
Dept: PULMONOLOGY | Facility: CLINIC | Age: 73
End: 2023-04-27

## 2023-04-27 ENCOUNTER — OFFICE VISIT (OUTPATIENT)
Dept: PULMONOLOGY | Facility: CLINIC | Age: 73
End: 2023-04-27
Payer: MEDICARE

## 2023-04-27 ENCOUNTER — HOSPITAL ENCOUNTER (INPATIENT)
Facility: HOSPITAL | Age: 73
LOS: 1 days | Discharge: HOME | DRG: 176 | End: 2023-04-29
Attending: EMERGENCY MEDICINE | Admitting: HOSPITALIST
Payer: MEDICARE

## 2023-04-27 ENCOUNTER — LAB VISIT (OUTPATIENT)
Dept: LAB | Facility: HOSPITAL | Age: 73
DRG: 176 | End: 2023-04-27
Attending: INTERNAL MEDICINE
Payer: MEDICARE

## 2023-04-27 VITALS
HEART RATE: 71 BPM | OXYGEN SATURATION: 96 % | DIASTOLIC BLOOD PRESSURE: 80 MMHG | BODY MASS INDEX: 40.95 KG/M2 | WEIGHT: 277.31 LBS | SYSTOLIC BLOOD PRESSURE: 132 MMHG

## 2023-04-27 DIAGNOSIS — R06.02 SHORTNESS OF BREATH AT REST: ICD-10-CM

## 2023-04-27 DIAGNOSIS — R06.02 SHORTNESS OF BREATH: ICD-10-CM

## 2023-04-27 DIAGNOSIS — E66.9 OBESITY, UNSPECIFIED CLASSIFICATION, UNSPECIFIED OBESITY TYPE, UNSPECIFIED WHETHER SERIOUS COMORBIDITY PRESENT: ICD-10-CM

## 2023-04-27 DIAGNOSIS — E03.9 HYPOTHYROIDISM, UNSPECIFIED TYPE: Primary | ICD-10-CM

## 2023-04-27 DIAGNOSIS — R07.9 CHEST PAIN, UNSPECIFIED TYPE: Primary | ICD-10-CM

## 2023-04-27 DIAGNOSIS — I26.99 PULMONARY EMBOLUS: Primary | ICD-10-CM

## 2023-04-27 DIAGNOSIS — G47.33 OBSTRUCTIVE SLEEP APNEA SYNDROME: ICD-10-CM

## 2023-04-27 DIAGNOSIS — R06.02 SOB (SHORTNESS OF BREATH): ICD-10-CM

## 2023-04-27 DIAGNOSIS — E03.9 HYPOTHYROIDISM, UNSPECIFIED TYPE: ICD-10-CM

## 2023-04-27 LAB
ALBUMIN SERPL BCP-MCNC: 4 G/DL (ref 3.5–5.2)
ALBUMIN SERPL BCP-MCNC: 4 G/DL (ref 3.5–5.2)
ALP SERPL-CCNC: 81 U/L (ref 55–135)
ALP SERPL-CCNC: 81 U/L (ref 55–135)
ALT SERPL W/O P-5'-P-CCNC: 25 U/L (ref 10–44)
ALT SERPL W/O P-5'-P-CCNC: 26 U/L (ref 10–44)
ANION GAP SERPL CALC-SCNC: 6 MMOL/L (ref 8–16)
ANION GAP SERPL CALC-SCNC: 8 MMOL/L (ref 8–16)
AST SERPL-CCNC: 19 U/L (ref 10–40)
AST SERPL-CCNC: 20 U/L (ref 10–40)
BASOPHILS # BLD AUTO: 0.07 K/UL (ref 0–0.2)
BASOPHILS # BLD AUTO: 0.07 K/UL (ref 0–0.2)
BASOPHILS NFR BLD: 0.6 % (ref 0–1.9)
BASOPHILS NFR BLD: 0.7 % (ref 0–1.9)
BILIRUB SERPL-MCNC: 0.5 MG/DL (ref 0.1–1)
BILIRUB SERPL-MCNC: 0.7 MG/DL (ref 0.1–1)
BNP SERPL-MCNC: 24 PG/ML (ref 0–99)
BNP SERPL-MCNC: 7 PG/ML (ref 0–99)
BUN SERPL-MCNC: 18 MG/DL (ref 8–23)
BUN SERPL-MCNC: 20 MG/DL (ref 8–23)
CALCIUM SERPL-MCNC: 9.3 MG/DL (ref 8.7–10.5)
CALCIUM SERPL-MCNC: 9.4 MG/DL (ref 8.7–10.5)
CHLORIDE SERPL-SCNC: 102 MMOL/L (ref 95–110)
CHLORIDE SERPL-SCNC: 104 MMOL/L (ref 95–110)
CO2 SERPL-SCNC: 28 MMOL/L (ref 23–29)
CO2 SERPL-SCNC: 28 MMOL/L (ref 23–29)
CREAT SERPL-MCNC: 1 MG/DL (ref 0.5–1.4)
CREAT SERPL-MCNC: 1.1 MG/DL (ref 0.5–1.4)
D DIMER PPP IA.FEU-MCNC: 1.14 MG/L FEU
DIFFERENTIAL METHOD: ABNORMAL
DIFFERENTIAL METHOD: ABNORMAL
EOSINOPHIL # BLD AUTO: 0.5 K/UL (ref 0–0.5)
EOSINOPHIL # BLD AUTO: 0.5 K/UL (ref 0–0.5)
EOSINOPHIL NFR BLD: 4.5 % (ref 0–8)
EOSINOPHIL NFR BLD: 5 % (ref 0–8)
ERYTHROCYTE [DISTWIDTH] IN BLOOD BY AUTOMATED COUNT: 13.3 % (ref 11.5–14.5)
ERYTHROCYTE [DISTWIDTH] IN BLOOD BY AUTOMATED COUNT: 13.4 % (ref 11.5–14.5)
EST. GFR  (NO RACE VARIABLE): >60 ML/MIN/1.73 M^2
EST. GFR  (NO RACE VARIABLE): >60 ML/MIN/1.73 M^2
GLUCOSE SERPL-MCNC: 117 MG/DL (ref 70–110)
GLUCOSE SERPL-MCNC: 123 MG/DL (ref 70–110)
HCT VFR BLD AUTO: 39.8 % (ref 40–54)
HCT VFR BLD AUTO: 41.6 % (ref 40–54)
HGB BLD-MCNC: 13.1 G/DL (ref 14–18)
HGB BLD-MCNC: 13.5 G/DL (ref 14–18)
IMM GRANULOCYTES # BLD AUTO: 0.07 K/UL (ref 0–0.04)
IMM GRANULOCYTES # BLD AUTO: 0.1 K/UL (ref 0–0.04)
IMM GRANULOCYTES NFR BLD AUTO: 0.6 % (ref 0–0.5)
IMM GRANULOCYTES NFR BLD AUTO: 0.9 % (ref 0–0.5)
LYMPHOCYTES # BLD AUTO: 2.5 K/UL (ref 1–4.8)
LYMPHOCYTES # BLD AUTO: 3.1 K/UL (ref 1–4.8)
LYMPHOCYTES NFR BLD: 23.1 % (ref 18–48)
LYMPHOCYTES NFR BLD: 28.6 % (ref 18–48)
MCH RBC QN AUTO: 28.3 PG (ref 27–31)
MCH RBC QN AUTO: 28.6 PG (ref 27–31)
MCHC RBC AUTO-ENTMCNC: 32.5 G/DL (ref 32–36)
MCHC RBC AUTO-ENTMCNC: 32.9 G/DL (ref 32–36)
MCV RBC AUTO: 87 FL (ref 82–98)
MCV RBC AUTO: 87 FL (ref 82–98)
MONOCYTES # BLD AUTO: 0.8 K/UL (ref 0.3–1)
MONOCYTES # BLD AUTO: 1 K/UL (ref 0.3–1)
MONOCYTES NFR BLD: 7.8 % (ref 4–15)
MONOCYTES NFR BLD: 9.2 % (ref 4–15)
NEUTROPHILS # BLD AUTO: 6.1 K/UL (ref 1.8–7.7)
NEUTROPHILS # BLD AUTO: 6.8 K/UL (ref 1.8–7.7)
NEUTROPHILS NFR BLD: 57 % (ref 38–73)
NEUTROPHILS NFR BLD: 62 % (ref 38–73)
NRBC BLD-RTO: 0 /100 WBC
NRBC BLD-RTO: 0 /100 WBC
PLATELET # BLD AUTO: 221 K/UL (ref 150–450)
PLATELET # BLD AUTO: 226 K/UL (ref 150–450)
PMV BLD AUTO: 10.3 FL (ref 9.2–12.9)
PMV BLD AUTO: 9.2 FL (ref 9.2–12.9)
POTASSIUM SERPL-SCNC: 4.2 MMOL/L (ref 3.5–5.1)
POTASSIUM SERPL-SCNC: 4.3 MMOL/L (ref 3.5–5.1)
PROT SERPL-MCNC: 7.4 G/DL (ref 6–8.4)
PROT SERPL-MCNC: 7.5 G/DL (ref 6–8.4)
RBC # BLD AUTO: 4.58 M/UL (ref 4.6–6.2)
RBC # BLD AUTO: 4.77 M/UL (ref 4.6–6.2)
SARS-COV-2 RDRP RESP QL NAA+PROBE: NEGATIVE
SODIUM SERPL-SCNC: 138 MMOL/L (ref 136–145)
SODIUM SERPL-SCNC: 138 MMOL/L (ref 136–145)
T4 FREE SERPL-MCNC: 1.1 NG/DL (ref 0.71–1.51)
TROPONIN I SERPL HS-MCNC: 6.8 PG/ML (ref 0–14.9)
TSH SERPL DL<=0.005 MIU/L-ACNC: <0.01 UIU/ML (ref 0.34–5.6)
WBC # BLD AUTO: 10.68 K/UL (ref 3.9–12.7)
WBC # BLD AUTO: 11.01 K/UL (ref 3.9–12.7)

## 2023-04-27 PROCEDURE — U0002 COVID-19 LAB TEST NON-CDC: HCPCS

## 2023-04-27 PROCEDURE — 84484 ASSAY OF TROPONIN QUANT: CPT | Performed by: EMERGENCY MEDICINE

## 2023-04-27 PROCEDURE — 96372 THER/PROPH/DIAG INJ SC/IM: CPT | Performed by: INTERNAL MEDICINE

## 2023-04-27 PROCEDURE — 85025 COMPLETE CBC W/AUTO DIFF WBC: CPT

## 2023-04-27 PROCEDURE — G0378 HOSPITAL OBSERVATION PER HR: HCPCS

## 2023-04-27 PROCEDURE — 84439 ASSAY OF FREE THYROXINE: CPT | Performed by: INTERNAL MEDICINE

## 2023-04-27 PROCEDURE — 93005 ELECTROCARDIOGRAM TRACING: CPT | Performed by: INTERNAL MEDICINE

## 2023-04-27 PROCEDURE — 80053 COMPREHEN METABOLIC PANEL: CPT

## 2023-04-27 PROCEDURE — 63600175 PHARM REV CODE 636 W HCPCS: Performed by: INTERNAL MEDICINE

## 2023-04-27 PROCEDURE — 99214 PR OFFICE/OUTPT VISIT, EST, LEVL IV, 30-39 MIN: ICD-10-PCS | Mod: S$GLB,,, | Performed by: INTERNAL MEDICINE

## 2023-04-27 PROCEDURE — 99285 EMERGENCY DEPT VISIT HI MDM: CPT | Mod: 25

## 2023-04-27 PROCEDURE — 80053 COMPREHEN METABOLIC PANEL: CPT | Performed by: INTERNAL MEDICINE

## 2023-04-27 PROCEDURE — 85379 FIBRIN DEGRADATION QUANT: CPT | Performed by: INTERNAL MEDICINE

## 2023-04-27 PROCEDURE — 25000003 PHARM REV CODE 250: Performed by: INTERNAL MEDICINE

## 2023-04-27 PROCEDURE — 93010 EKG 12-LEAD: ICD-10-PCS | Mod: ,,, | Performed by: INTERNAL MEDICINE

## 2023-04-27 PROCEDURE — 85025 COMPLETE CBC W/AUTO DIFF WBC: CPT | Performed by: INTERNAL MEDICINE

## 2023-04-27 PROCEDURE — 99214 OFFICE O/P EST MOD 30 MIN: CPT | Mod: S$GLB,,, | Performed by: INTERNAL MEDICINE

## 2023-04-27 PROCEDURE — 93010 ELECTROCARDIOGRAM REPORT: CPT | Mod: ,,, | Performed by: INTERNAL MEDICINE

## 2023-04-27 PROCEDURE — 36415 COLL VENOUS BLD VENIPUNCTURE: CPT | Performed by: INTERNAL MEDICINE

## 2023-04-27 PROCEDURE — 83880 ASSAY OF NATRIURETIC PEPTIDE: CPT | Performed by: EMERGENCY MEDICINE

## 2023-04-27 PROCEDURE — 93010 ELECTROCARDIOGRAM REPORT: CPT | Mod: 76,,, | Performed by: INTERNAL MEDICINE

## 2023-04-27 PROCEDURE — 83880 ASSAY OF NATRIURETIC PEPTIDE: CPT | Performed by: INTERNAL MEDICINE

## 2023-04-27 PROCEDURE — 25500020 PHARM REV CODE 255: Performed by: EMERGENCY MEDICINE

## 2023-04-27 PROCEDURE — 84443 ASSAY THYROID STIM HORMONE: CPT | Performed by: INTERNAL MEDICINE

## 2023-04-27 RX ORDER — DILTIAZEM HYDROCHLORIDE 240 MG/1
240 CAPSULE, EXTENDED RELEASE ORAL DAILY
Status: DISCONTINUED | OUTPATIENT
Start: 2023-04-28 | End: 2023-04-29 | Stop reason: HOSPADM

## 2023-04-27 RX ORDER — ALBUTEROL SULFATE 0.83 MG/ML
2.5 SOLUTION RESPIRATORY (INHALATION) EVERY 6 HOURS
Status: DISCONTINUED | OUTPATIENT
Start: 2023-04-28 | End: 2023-04-27

## 2023-04-27 RX ORDER — IBUPROFEN 200 MG
16 TABLET ORAL
Status: DISCONTINUED | OUTPATIENT
Start: 2023-04-27 | End: 2023-04-29 | Stop reason: HOSPADM

## 2023-04-27 RX ORDER — ACETAMINOPHEN 500 MG
2 TABLET ORAL EVERY 8 HOURS PRN
COMMUNITY

## 2023-04-27 RX ORDER — ONDANSETRON 2 MG/ML
4 INJECTION INTRAMUSCULAR; INTRAVENOUS EVERY 6 HOURS PRN
Status: DISCONTINUED | OUTPATIENT
Start: 2023-04-27 | End: 2023-04-29 | Stop reason: HOSPADM

## 2023-04-27 RX ORDER — IPRATROPIUM BROMIDE AND ALBUTEROL SULFATE 2.5; .5 MG/3ML; MG/3ML
3 SOLUTION RESPIRATORY (INHALATION) EVERY 4 HOURS PRN
Status: DISCONTINUED | OUTPATIENT
Start: 2023-04-27 | End: 2023-04-29 | Stop reason: HOSPADM

## 2023-04-27 RX ORDER — AMOXICILLIN 250 MG
1 CAPSULE ORAL 2 TIMES DAILY PRN
Status: DISCONTINUED | OUTPATIENT
Start: 2023-04-27 | End: 2023-04-29 | Stop reason: HOSPADM

## 2023-04-27 RX ORDER — TALC
6 POWDER (GRAM) TOPICAL NIGHTLY PRN
Status: DISCONTINUED | OUTPATIENT
Start: 2023-04-27 | End: 2023-04-29 | Stop reason: HOSPADM

## 2023-04-27 RX ORDER — LEVOCETIRIZINE DIHYDROCHLORIDE 5 MG/1
5 TABLET, FILM COATED ORAL
COMMUNITY

## 2023-04-27 RX ORDER — IBUPROFEN 200 MG
24 TABLET ORAL
Status: DISCONTINUED | OUTPATIENT
Start: 2023-04-27 | End: 2023-04-29 | Stop reason: HOSPADM

## 2023-04-27 RX ORDER — FINASTERIDE 5 MG/1
5 TABLET, FILM COATED ORAL DAILY
Status: DISCONTINUED | OUTPATIENT
Start: 2023-04-28 | End: 2023-04-29 | Stop reason: HOSPADM

## 2023-04-27 RX ORDER — SODIUM CHLORIDE 0.9 % (FLUSH) 0.9 %
10 SYRINGE (ML) INJECTION
Status: DISCONTINUED | OUTPATIENT
Start: 2023-04-27 | End: 2023-04-29 | Stop reason: HOSPADM

## 2023-04-27 RX ORDER — ASPIRIN 81 MG/1
81 TABLET ORAL DAILY
Status: DISCONTINUED | OUTPATIENT
Start: 2023-04-28 | End: 2023-04-29 | Stop reason: HOSPADM

## 2023-04-27 RX ORDER — ACETAMINOPHEN 500 MG
1000 TABLET ORAL EVERY 8 HOURS PRN
Status: DISCONTINUED | OUTPATIENT
Start: 2023-04-27 | End: 2023-04-27

## 2023-04-27 RX ORDER — PANTOPRAZOLE SODIUM 40 MG/1
40 TABLET, DELAYED RELEASE ORAL
Status: DISCONTINUED | OUTPATIENT
Start: 2023-04-28 | End: 2023-04-29 | Stop reason: HOSPADM

## 2023-04-27 RX ORDER — IPRATROPIUM BROMIDE AND ALBUTEROL SULFATE 2.5; .5 MG/3ML; MG/3ML
3 SOLUTION RESPIRATORY (INHALATION) EVERY 6 HOURS
Status: DISCONTINUED | OUTPATIENT
Start: 2023-04-28 | End: 2023-04-29 | Stop reason: HOSPADM

## 2023-04-27 RX ORDER — CICLOPIROX OLAMINE 7.7 MG/G
1 CREAM TOPICAL DAILY PRN
COMMUNITY
Start: 2022-12-12

## 2023-04-27 RX ORDER — GLUCAGON 1 MG
1 KIT INJECTION
Status: DISCONTINUED | OUTPATIENT
Start: 2023-04-27 | End: 2023-04-29 | Stop reason: HOSPADM

## 2023-04-27 RX ORDER — MONTELUKAST SODIUM 10 MG/1
10 TABLET ORAL DAILY
Status: DISCONTINUED | OUTPATIENT
Start: 2023-04-28 | End: 2023-04-29 | Stop reason: HOSPADM

## 2023-04-27 RX ORDER — SPIRONOLACTONE 25 MG/1
25 TABLET ORAL DAILY
Status: DISCONTINUED | OUTPATIENT
Start: 2023-04-28 | End: 2023-04-29 | Stop reason: HOSPADM

## 2023-04-27 RX ORDER — NALOXONE HCL 0.4 MG/ML
0.02 VIAL (ML) INJECTION
Status: DISCONTINUED | OUTPATIENT
Start: 2023-04-27 | End: 2023-04-29 | Stop reason: HOSPADM

## 2023-04-27 RX ORDER — SOD SULF/POT CHLORIDE/MAG SULF 1.479 G
TABLET ORAL
COMMUNITY
Start: 2022-12-22 | End: 2023-04-27 | Stop reason: ALTCHOICE

## 2023-04-27 RX ORDER — IPRATROPIUM BROMIDE 0.5 MG/2.5ML
0.5 SOLUTION RESPIRATORY (INHALATION) EVERY 6 HOURS
Status: DISCONTINUED | OUTPATIENT
Start: 2023-04-28 | End: 2023-04-27

## 2023-04-27 RX ORDER — CLONIDINE HYDROCHLORIDE 0.1 MG/1
TABLET ORAL
COMMUNITY
Start: 2022-12-02 | End: 2023-04-27

## 2023-04-27 RX ORDER — BENZONATATE 100 MG/1
100 CAPSULE ORAL 3 TIMES DAILY PRN
COMMUNITY
Start: 2023-04-19

## 2023-04-27 RX ORDER — SIMETHICONE 80 MG
1 TABLET,CHEWABLE ORAL 4 TIMES DAILY PRN
Status: DISCONTINUED | OUTPATIENT
Start: 2023-04-27 | End: 2023-04-29 | Stop reason: HOSPADM

## 2023-04-27 RX ORDER — BUTYROSPERMUM PARKII(SHEA BUTTER), SIMMONDSIA CHINENSIS (JOJOBA) SEED OIL, ALOE BARBADENSIS LEAF EXTRACT .01; 1; 3.5 G/100G; G/100G; G/100G
1 LIQUID TOPICAL
COMMUNITY
Start: 2022-11-22

## 2023-04-27 RX ORDER — POLYETHYLENE GLYCOL 3350 17 G/17G
17 POWDER, FOR SOLUTION ORAL 2 TIMES DAILY PRN
Status: DISCONTINUED | OUTPATIENT
Start: 2023-04-27 | End: 2023-04-29 | Stop reason: HOSPADM

## 2023-04-27 RX ORDER — CLONIDINE 0.3 MG/24H
1 PATCH, EXTENDED RELEASE TRANSDERMAL
Status: DISCONTINUED | OUTPATIENT
Start: 2023-04-28 | End: 2023-04-29 | Stop reason: HOSPADM

## 2023-04-27 RX ORDER — ENOXAPARIN SODIUM 100 MG/ML
1 INJECTION SUBCUTANEOUS
Status: DISCONTINUED | OUTPATIENT
Start: 2023-04-27 | End: 2023-04-29 | Stop reason: HOSPADM

## 2023-04-27 RX ORDER — LOSARTAN POTASSIUM 50 MG/1
50 TABLET ORAL DAILY
Status: DISCONTINUED | OUTPATIENT
Start: 2023-04-28 | End: 2023-04-29 | Stop reason: HOSPADM

## 2023-04-27 RX ORDER — ACETAMINOPHEN 325 MG/1
650 TABLET ORAL EVERY 8 HOURS PRN
Status: DISCONTINUED | OUTPATIENT
Start: 2023-04-27 | End: 2023-04-29 | Stop reason: HOSPADM

## 2023-04-27 RX ORDER — AZITHROMYCIN 250 MG/1
250 TABLET, FILM COATED ORAL
COMMUNITY
Start: 2023-04-19 | End: 2023-04-27 | Stop reason: ALTCHOICE

## 2023-04-27 RX ORDER — TAMSULOSIN HYDROCHLORIDE 0.4 MG/1
1 CAPSULE ORAL NIGHTLY
Status: DISCONTINUED | OUTPATIENT
Start: 2023-04-27 | End: 2023-04-29 | Stop reason: HOSPADM

## 2023-04-27 RX ORDER — MINERAL OIL
ENEMA (ML) RECTAL
Status: ON HOLD | COMMUNITY
End: 2023-05-23

## 2023-04-27 RX ORDER — ATORVASTATIN CALCIUM 20 MG/1
20 TABLET, FILM COATED ORAL DAILY
Status: DISCONTINUED | OUTPATIENT
Start: 2023-04-28 | End: 2023-04-29 | Stop reason: HOSPADM

## 2023-04-27 RX ADMIN — IOHEXOL 100 ML: 350 INJECTION, SOLUTION INTRAVENOUS at 05:04

## 2023-04-27 RX ADMIN — TAMSULOSIN HYDROCHLORIDE 0.4 MG: 0.4 CAPSULE ORAL at 10:04

## 2023-04-27 RX ADMIN — ENOXAPARIN SODIUM 120 MG: 60 INJECTION SUBCUTANEOUS at 10:04

## 2023-04-27 NOTE — PROGRESS NOTES
[facility]  Pulmonology  Follow-Up Clinic Visit    Subjective   Reason for Referral:    Srinivasa White is a 72 y.o. male referred by Dr. Zamora for evaluation of shortness of breath.    Chief Complaint   Patient presents with    Shortness of Breath     Follow up gets shot of breath often when he excerts himself for the past couple weeks ; Started in October 2020 when he had covid gets a lot of phlem ; Still has phlem coughing up       10/4/2021:  Mr. Srinivasa White is a 71 year old gentleman with a history of CHF, HTN and prior COVID-19 who was in his usual state of health until several months ago when he developed exertional dyspnea.  3 months ago he was hospitalized with a CHF exacerbation.  His dypnea improved with IV diuretics and he was discharged home on oxygen.  He was referred her by his cardiologist;s NP.  He smoked 2 packs of cigarettes in the 1960's.  He is unaware of any intrinsic pulmonary disease.    He was in his usual state of health until 8 months ago, when he began experiencing dyspnea on exertion.  His symptoms were gradual in onset and stable since.  Symptoms occur while getting dressed.  Associated symptoms include lower extremity edema and orthopnea, but He denies any wheezing, sputum production, or pleurisy.  His symptoms are exacerbated by any exercise and improved with rest, oxygen and diuretics.   His weight has been stable, on diuretics.  His appetite has been unchanged.  The patient is having no constitutional symptoms, denying fever, chills, anorexia, or weight loss..  Work up so far has included CT chest, CXR and Echo.  Prior treatments include diuretics, which has provided some symptom releif.  He is currently prescribed lasix that he takes when he gains weight.  The patient reports adherence to this regimen.    He uses several pillows at night.   He currently is on oxygen at 2 L/min per nasal cannula..  He smoked 2 packs of cigarettes in his life.   He has (difficulty  walking 50 yards on flat ground).  He has been hospitalized twice due to his breathing.    11/1/2021:  In pretty well since our last visit from a respiratory perspective.  He was seen by his nephrologist who switched him from furosemide to indepamide.  He has been taking this and has noticed that his edema is been better.  He had a 6 minute walk test which she completed without oxygen and did not desaturate.  Overall he is relatively free from respiratory symptoms at this time.  He did recently have a urinary tract infection the presented with some cystitis and flank pain.  He is currently taking ciprofloxacin for a total of 10 days.  His symptoms associated with his urinary tract infection improved over the weekend.    11/1/2022 - Here for follow up, Feels he is doing pretty well overall.  He has weaned himself off of O2.  Prior PFT showed mild restriction and normal DLCO.  He has not been tested for sleep apnea - he does snore but denies observed apneas.  He denies EDS.  He has some chronic nasal congestion.  He had Covid in 2020.      4/27/2023 - Here for follow up, he reports that over the last 3 weeks has noted increased exertional dyspnea and reports drops in sats to the 70's when walking.  Weight is up 13 # since last visit.  He has some cough with thick white secretions and intermittent wheezing.  No f,c,s, or CP.  His home sleep study showed mild STEPHANIE (DAMIEN - 8) and lowest sat 88%.     Past Medical History:   Diagnosis Date    Arthritis     BPH (benign prostatic hyperplasia)     H/O colonoscopy 2005    Polyps removed    Hypertension      Past Surgical History:   Procedure Laterality Date    APPENDECTOMY  1974    LASER OF PROSTATE W/ GREEN LIGHT PVP  10/20/15    left leg Schrap medat  1970    TONSILLECTOMY  1958    WISDOM TOOTH EXTRACTION  1964     No family history on file.   Social History     Tobacco Use    Smoking status: Former     Types: Cigarettes    Smokeless tobacco: Never   Substance and Sexual  Activity    Alcohol use: Yes     Alcohol/week: 0.0 standard drinks     Comment: occasional beer or wine monthly    Drug use: No    Sexual activity: Not on file      Allergies:   Xlzcghqx-ktohrmxsqfw-deqbwqhet     Outpatient Medications as of 4/27/2023   Medication    aspirin (ECOTRIN) 81 MG EC tablet    atorvastatin (LIPITOR) 20 MG tablet    azithromycin (Z-KAYLEN) 250 MG tablet    b complex vitamins capsule    B-complex with vitamin C (Z-BEC OR EQUIV) tablet    benzonatate (TESSALON) 100 MG capsule    bran/gum/fib/dee dee/psyl/kelp/pec (FIBER 6 ORAL)    calcium citrate malate-vit D3 (OSCAL) 250 mg-2.5 mcg (100 unit) Tab    ciclopirox (LOPROX) 0.77 % Crea    cloNIDine (CATAPRES) 0.1 MG tablet    cloNIDine 0.3 mg/24 hr td ptwk (CATAPRES) 0.3 mg/24 hr    COCONUT OIL ORAL    diltiaZEM HCl (CARDIZEM LA) 240 mg 24 hr tablet    felodipine (PLENDIL) 5 MG 24 hr tablet    folic acid-B hxcv-F-thiad-zinc (DIALYVITE 3000) 3-70-15 mg-mcg-mg Tab    glucosamine-chondroitin 500-400 mg tablet    indapamide (LOZOL) 2.5 MG Tab    Lactobacillus rhamnosus GG (CULTURELLE) 10 billion cell capsule    levocetirizine (XYZAL) 5 MG tablet    lipase-protease-amylase (PANCREAZE) 21,000-54,700- 83,900 unit CpDR    loratadine (CLARITIN) 10 mg tablet    magnesium citrate 100 mg Cap    magnesium gluconate 27.5 mg magne- sium (500 mg) Tab    naproxen (NAPROSYN) 500 MG tablet    olmesartan (BENICAR) 40 MG tablet    omeprazole (PRILOSEC) 40 MG capsule    polycarbophil (FIBERCON) 625 mg tablet    spironolactone (ALDACTONE) 25 MG tablet    SUTAB 1.479-0.188- 0.225 gram tablet    tamsulosin (FLOMAX) 0.4 mg Cap    vitamin D (VITAMIN D3) 1000 units Tab    finasteride (PROSCAR) 5 mg tablet    K-PHOS ORIGINAL 500 mg TbSO     No current facility-administered medications on file as of 4/27/2023.      Review of Systems   Constitutional:  Positive for weight loss and fatigue. Negative for fever, chills and night sweats.   HENT:  Negative for postnasal drip, rhinorrhea,  sinus pressure and congestion.    Eyes:  Negative for redness and itching.   Respiratory:  Positive for cough, shortness of breath, wheezing, orthopnea and dyspnea on extertion.    Cardiovascular:  Positive for leg swelling. Negative for chest pain and palpitations.   Genitourinary:  Negative for difficulty urinating and hematuria.   Endocrine:  Negative for polydipsia, polyphagia and polyuria.    Musculoskeletal:  Negative for gait problem, joint swelling and myalgias.   Skin:  Negative for rash.   Gastrointestinal:  Negative for nausea, vomiting, abdominal pain and acid reflux.   Neurological:  Negative for syncope, weakness and headaches.   Hematological:  Negative for adenopathy. Does not bruise/bleed easily and no excessive bruising.   Psychiatric/Behavioral:  Negative for confusion and sleep disturbance. The patient is not nervous/anxious.    All other systems reviewed and are negative.   Previous Reports Reviewed:   ER records, historical medical records, lab reports, nursing home notes, office notes, operative reports, radiology reports, referral letter/letters and x-ray reports   The following portions of the patient's history were reviewed and updated as appropriate: allergies, current medications, past family history, past medical history, past social history, past surgical history and problem list.    Objective:      /80 (BP Location: Left arm, Patient Position: Sitting)   Pulse 71   Wt 125.8 kg (277 lb 4.8 oz)   SpO2 96%   BMI 40.95 kg/m²   Body mass index is 40.95 kg/m².     Physical Exam   Constitutional: He is oriented to person, place, and time. He appears well-developed and well-nourished. No distress.   HENT:   Head: Normocephalic.   Mouth/Throat: Oropharynx is clear and moist. Mallampati Score: III.   Neck: No thyromegaly present.   Cardiovascular: Normal rate, regular rhythm, normal heart sounds and intact distal pulses.   Pulmonary/Chest: Normal expansion, symmetric chest wall  expansion and effort normal. He has no wheezes. He has no rhonchi. He has no rales.   Abdominal: Soft. Bowel sounds are normal. He exhibits no distension. There is no abdominal tenderness.   Musculoskeletal:         General: No tenderness, deformity or edema. Normal range of motion.      Cervical back: Normal range of motion and neck supple.   Lymphadenopathy: No supraclavicular adenopathy is present.     He has no cervical adenopathy.     He has no axillary adenopathy.   Neurological: He is alert and oriented to person, place, and time. No cranial nerve deficit.   Skin: Skin is warm and dry. No rash noted.   Psychiatric: He has a normal mood and affect.   Nursing note and vitals reviewed.   Personal Review of Relevant Diagnostic Studies:  I have personally reviewed and interpreted the following labs/studies/images.  6MWT:  10/21/2021:  Distance:  Predicted:  443.2 m  Actual:  426.72 m  SpO2:  Rest:  96% on RA  Exercise:  97% on RA.      PFTs:  10/21/2021:  FVC:  2.37 959%)  FEV1:  1.92 (64%)  FEV1/FVC:  81  T.91 (71%)  DLCO:  16.48 (67%)  My impression.  No obstruction.  Mild restriction.  Mild diffusion impairment.  2023  FVC - 82%  FEV1 - 89%  FEV1/FVC - 82%  TLC - 69%  DLCO - 65%  Moderate restriction, mild decrease DLCO  Nonhypoxemic walk test    CXR:  10/19/2021:  Blunting of the costophrenic angles, right greater than left are consistent with small pleural effusions    BMP:  2021:  140 / 3.7 / 104 / 28 / 13 / 0.74 / 134  D-dimer:  <200 (normal)    NT-proBNP:  2021:  522    CTA chest:  2021:  My impression:  Small filling defect in a subsegmental artery that looks more like a flow void.  I am unconvinced that it represent a clot.  1.  Small focal peripheral filling defect in a subsegmental artery supplying the lateral segment of the right middle lobe likely reflects a chronic pulmonary embolism.  2.  The thoracic aorta is normal caliber with mild atherosclerosis    Assessment:       1.  Hypothyroidism, unspecified type    2. Shortness of breath    3. Obesity, unspecified classification, unspecified obesity type, unspecified whether serious comorbidity present            Plan:     Not clear of etiology of dyspnea or the reported drops in sats  He reports drops in his sats  Prior PFT showed mild restriction with normal DLCO  Prior walk test - no desaturation  Check labs  Check d-dimer - may need to repeat CTA          Orders Placed This Visit:  Orders Placed This Encounter   Procedures    Comprehensive Metabolic Panel     Standing Status:   Future     Standing Expiration Date:   6/25/2024    CBC auto differential     Standing Status:   Future     Standing Expiration Date:   6/25/2024    TSH     Standing Status:   Future     Standing Expiration Date:   6/25/2024    D-Dimer, Quantitative     Standing Status:   Future     Standing Expiration Date:   6/25/2024    Brain natriuretic peptide     Standing Status:   Future     Standing Expiration Date:   6/25/2024    Complete PFT w/ bronchodilator     Standing Status:   Future     Standing Expiration Date:   4/27/2024     Order Specific Question:   Release to patient     Answer:   Immediate    Six Minute Walk Test to qualify for Home Oxygen     Standing Status:   Future     Standing Expiration Date:   4/27/2024     Order Specific Question:   Release to patient     Answer:   Immediate           Jorgito Kent MD  [facility] Pulmonary/Critical Care  04/27/2023

## 2023-04-28 ENCOUNTER — CLINICAL SUPPORT (OUTPATIENT)
Dept: CARDIOLOGY | Facility: HOSPITAL | Age: 73
DRG: 176 | End: 2023-04-28
Attending: INTERNAL MEDICINE
Payer: MEDICARE

## 2023-04-28 VITALS — WEIGHT: 277 LBS | HEIGHT: 69 IN | BODY MASS INDEX: 41.03 KG/M2

## 2023-04-28 PROBLEM — E66.01 MORBID OBESITY: Status: ACTIVE | Noted: 2018-01-05

## 2023-04-28 LAB
ANION GAP SERPL CALC-SCNC: 8 MMOL/L (ref 8–16)
AORTIC ROOT ANNULUS: 3.9 CM
AORTIC VALVE CUSP SEPERATION: 2.6 CM
AV INDEX (PROSTH): 0.75
AV MEAN GRADIENT: 5 MMHG
AV PEAK GRADIENT: 9 MMHG
AV VALVE AREA: 2.36 CM2
AV VELOCITY RATIO: 0.71
BASOPHILS # BLD AUTO: 0.05 K/UL (ref 0–0.2)
BASOPHILS NFR BLD: 0.5 % (ref 0–1.9)
BSA FOR ECHO PROCEDURE: 2.47 M2
BUN SERPL-MCNC: 21 MG/DL (ref 8–23)
CALCIUM SERPL-MCNC: 9 MG/DL (ref 8.7–10.5)
CHLORIDE SERPL-SCNC: 104 MMOL/L (ref 95–110)
CO2 SERPL-SCNC: 25 MMOL/L (ref 23–29)
CREAT SERPL-MCNC: 1 MG/DL (ref 0.5–1.4)
CV ECHO LV RWT: 0.38 CM
DIFFERENTIAL METHOD: ABNORMAL
DOP CALC AO PEAK VEL: 1.48 M/S
DOP CALC AO VTI: 28 CM
DOP CALC LVOT AREA: 3.1 CM2
DOP CALC LVOT DIAMETER: 2 CM
DOP CALC LVOT PEAK VEL: 1.05 M/S
DOP CALC LVOT STROKE VOLUME: 65.94 CM3
DOP CALC MV VTI: 32.9 CM
DOP CALCLVOT PEAK VEL VTI: 21 CM
E WAVE DECELERATION TIME: 257 MSEC
E/A RATIO: 0.77
E/E' RATIO: 11.14 M/S
ECHO LV POSTERIOR WALL: 0.98 CM (ref 0.6–1.1)
EJECTION FRACTION: 60 %
EOSINOPHIL # BLD AUTO: 0.5 K/UL (ref 0–0.5)
EOSINOPHIL NFR BLD: 5 % (ref 0–8)
ERYTHROCYTE [DISTWIDTH] IN BLOOD BY AUTOMATED COUNT: 13.3 % (ref 11.5–14.5)
EST. GFR  (NO RACE VARIABLE): >60 ML/MIN/1.73 M^2
FRACTIONAL SHORTENING: 31 % (ref 28–44)
GLUCOSE SERPL-MCNC: 124 MG/DL (ref 70–110)
HCT VFR BLD AUTO: 38.7 % (ref 40–54)
HGB BLD-MCNC: 12.6 G/DL (ref 14–18)
IMM GRANULOCYTES # BLD AUTO: 0.06 K/UL (ref 0–0.04)
IMM GRANULOCYTES NFR BLD AUTO: 0.6 % (ref 0–0.5)
INTERVENTRICULAR SEPTUM: 1.09 CM (ref 0.6–1.1)
LEFT INTERNAL DIMENSION IN SYSTOLE: 3.52 CM (ref 2.1–4)
LEFT VENTRICLE DIASTOLIC VOLUME INDEX: 53.16 ML/M2
LEFT VENTRICLE DIASTOLIC VOLUME: 126 ML
LEFT VENTRICLE MASS INDEX: 84 G/M2
LEFT VENTRICLE SYSTOLIC VOLUME INDEX: 21.8 ML/M2
LEFT VENTRICLE SYSTOLIC VOLUME: 51.6 ML
LEFT VENTRICULAR INTERNAL DIMENSION IN DIASTOLE: 5.13 CM (ref 3.5–6)
LEFT VENTRICULAR MASS: 198.82 G
LV LATERAL E/E' RATIO: 13 M/S
LV SEPTAL E/E' RATIO: 9.75 M/S
LVOT MG: 2 MMHG
LVOT MV: 0.69 CM/S
LYMPHOCYTES # BLD AUTO: 2.7 K/UL (ref 1–4.8)
LYMPHOCYTES NFR BLD: 25.8 % (ref 18–48)
MAGNESIUM SERPL-MCNC: 2.2 MG/DL (ref 1.6–2.6)
MCH RBC QN AUTO: 28.4 PG (ref 27–31)
MCHC RBC AUTO-ENTMCNC: 32.6 G/DL (ref 32–36)
MCV RBC AUTO: 87 FL (ref 82–98)
MONOCYTES # BLD AUTO: 0.8 K/UL (ref 0.3–1)
MONOCYTES NFR BLD: 8 % (ref 4–15)
MV MEAN GRADIENT: 2 MMHG
MV PEAK A VEL: 1.01 M/S
MV PEAK E VEL: 0.78 M/S
MV PEAK GRADIENT: 5 MMHG
MV STENOSIS PRESSURE HALF TIME: 86 MS
MV VALVE AREA BY CONTINUITY EQUATION: 2 CM2
MV VALVE AREA P 1/2 METHOD: 2.56 CM2
NEUTROPHILS # BLD AUTO: 6.3 K/UL (ref 1.8–7.7)
NEUTROPHILS NFR BLD: 60.1 % (ref 38–73)
NRBC BLD-RTO: 0 /100 WBC
PLATELET # BLD AUTO: 202 K/UL (ref 150–450)
PMV BLD AUTO: 9.8 FL (ref 9.2–12.9)
POTASSIUM SERPL-SCNC: 4 MMOL/L (ref 3.5–5.1)
PV MV: 0.67 M/S
PV PEAK VELOCITY: 0.96 CM/S
RBC # BLD AUTO: 4.44 M/UL (ref 4.6–6.2)
RV TISSUE DOPPLER FREE WALL SYSTOLIC VELOCITY 1 (APICAL 4 CHAMBER VIEW): 0.02 CM/S
SODIUM SERPL-SCNC: 137 MMOL/L (ref 136–145)
TDI LATERAL: 0.06 M/S
TDI SEPTAL: 0.08 M/S
TDI: 0.07 M/S
TRICUSPID ANNULAR PLANE SYSTOLIC EXCURSION: 2.57 CM
WBC # BLD AUTO: 10.39 K/UL (ref 3.9–12.7)

## 2023-04-28 PROCEDURE — 93306 ECHO (CUPID ONLY): ICD-10-PCS | Mod: 26,,, | Performed by: INTERNAL MEDICINE

## 2023-04-28 PROCEDURE — 83735 ASSAY OF MAGNESIUM: CPT | Performed by: INTERNAL MEDICINE

## 2023-04-28 PROCEDURE — 93306 TTE W/DOPPLER COMPLETE: CPT

## 2023-04-28 PROCEDURE — 63600175 PHARM REV CODE 636 W HCPCS: Performed by: INTERNAL MEDICINE

## 2023-04-28 PROCEDURE — 93306 TTE W/DOPPLER COMPLETE: CPT | Mod: 26,,, | Performed by: INTERNAL MEDICINE

## 2023-04-28 PROCEDURE — 99900035 HC TECH TIME PER 15 MIN (STAT)

## 2023-04-28 PROCEDURE — 94640 AIRWAY INHALATION TREATMENT: CPT

## 2023-04-28 PROCEDURE — 36415 COLL VENOUS BLD VENIPUNCTURE: CPT | Performed by: INTERNAL MEDICINE

## 2023-04-28 PROCEDURE — 96372 THER/PROPH/DIAG INJ SC/IM: CPT | Performed by: INTERNAL MEDICINE

## 2023-04-28 PROCEDURE — 21400001 HC TELEMETRY ROOM

## 2023-04-28 PROCEDURE — 80048 BASIC METABOLIC PNL TOTAL CA: CPT | Performed by: INTERNAL MEDICINE

## 2023-04-28 PROCEDURE — 25000242 PHARM REV CODE 250 ALT 637 W/ HCPCS: Performed by: INTERNAL MEDICINE

## 2023-04-28 PROCEDURE — 94799 UNLISTED PULMONARY SVC/PX: CPT

## 2023-04-28 PROCEDURE — 99900031 HC PATIENT EDUCATION (STAT)

## 2023-04-28 PROCEDURE — 94761 N-INVAS EAR/PLS OXIMETRY MLT: CPT

## 2023-04-28 PROCEDURE — 25000003 PHARM REV CODE 250: Performed by: INTERNAL MEDICINE

## 2023-04-28 PROCEDURE — 85025 COMPLETE CBC W/AUTO DIFF WBC: CPT | Performed by: INTERNAL MEDICINE

## 2023-04-28 RX ADMIN — PANTOPRAZOLE SODIUM 40 MG: 40 TABLET, DELAYED RELEASE ORAL at 05:04

## 2023-04-28 RX ADMIN — IPRATROPIUM BROMIDE AND ALBUTEROL SULFATE 3 ML: .5; 3 SOLUTION RESPIRATORY (INHALATION) at 07:04

## 2023-04-28 RX ADMIN — ATORVASTATIN CALCIUM 20 MG: 20 TABLET, FILM COATED ORAL at 09:04

## 2023-04-28 RX ADMIN — IPRATROPIUM BROMIDE AND ALBUTEROL SULFATE 3 ML: .5; 3 SOLUTION RESPIRATORY (INHALATION) at 01:04

## 2023-04-28 RX ADMIN — SPIRONOLACTONE 25 MG: 25 TABLET ORAL at 09:04

## 2023-04-28 RX ADMIN — MONTELUKAST 10 MG: 10 TABLET, FILM COATED ORAL at 09:04

## 2023-04-28 RX ADMIN — ENOXAPARIN SODIUM 120 MG: 60 INJECTION SUBCUTANEOUS at 09:04

## 2023-04-28 RX ADMIN — ASPIRIN 81 MG: 81 TABLET, COATED ORAL at 09:04

## 2023-04-28 RX ADMIN — DILTIAZEM HYDROCHLORIDE 240 MG: 240 CAPSULE, EXTENDED RELEASE ORAL at 09:04

## 2023-04-28 RX ADMIN — TAMSULOSIN HYDROCHLORIDE 0.4 MG: 0.4 CAPSULE ORAL at 09:04

## 2023-04-28 NOTE — SUBJECTIVE & OBJECTIVE
Past Medical History:   Diagnosis Date    Arthritis     BPH (benign prostatic hyperplasia)     H/O colonoscopy 2005    Polyps removed    Hypertension        Past Surgical History:   Procedure Laterality Date    APPENDECTOMY  1974    LASER OF PROSTATE W/ GREEN LIGHT PVP  10/20/15    left leg Schrap medat  1970    TONSILLECTOMY  1958    WISDOM TOOTH EXTRACTION  1964       Review of patient's allergies indicates:   Allergen Reactions    Shjstqis-mvlsuqqiwox-blsgqfshg      Per pt, causes blood in urine.        No current facility-administered medications on file prior to encounter.     Current Outpatient Medications on File Prior to Encounter   Medication Sig    aspirin (ECOTRIN) 81 MG EC tablet Take 81 mg by mouth once daily.    atorvastatin (LIPITOR) 20 MG tablet Take 20 mg by mouth once daily.    b complex vitamins capsule Take 1 capsule by mouth once daily.    benzonatate (TESSALON) 100 MG capsule Take 100 mg by mouth 3 (three) times daily as needed.    ciclopirox (LOPROX) 0.77 % Crea Apply 1 application topically daily as needed.    COCONUT OIL ORAL Take 1 capsule by mouth once daily.    diltiaZEM HCl (CARDIZEM LA) 240 mg 24 hr tablet Take 240 mg by mouth once daily.    felodipine (PLENDIL) 5 MG 24 hr tablet Take 5 mg by mouth once daily.    glucosamine-chondroitin 500-400 mg tablet Take 1 tablet by mouth 2 (two) times a day. 1500mg/MSM 1500mg    indapamide (LOZOL) 2.5 MG Tab Take 2.5 mg by mouth once daily.    inulin (FIBER GUMMIES ORAL) Take 1 capsule by mouth once daily.    Lactobacillus rhamnosus GG (CULTURELLE) 10 billion cell capsule Take 1 capsule by mouth once daily.    levocetirizine (XYZAL) 5 MG tablet Take 5 mg by mouth.    magnesium citrate 100 mg Cap Take 1 capsule by mouth.    olmesartan (BENICAR) 40 MG tablet Take 40 mg by mouth once daily.    omeprazole (PRILOSEC) 40 MG capsule Take 40 mg by mouth once daily.    spironolactone (ALDACTONE) 25 MG tablet Take 25 mg by mouth once daily.    tamsulosin  (FLOMAX) 0.4 mg Cap Take 1 capsule by mouth every evening.    [DISCONTINUED] bran/gum/fib/dee dee/psyl/kelp/pec (FIBER 6 ORAL) Take 1 capsule by mouth once daily.    acetaminophen (TYLENOL) 500 MG tablet Take 2 tablets by mouth every 8 (eight) hours as needed.    calcium citrate malate-vit D3 (OSCAL) 250 mg-2.5 mcg (100 unit) Tab Take 1 tablet by mouth.    fexofenadine (ALLEGRA) 180 MG tablet Indications: inflammation of the nose due to an allergy. Take one every morning    K-PHOS ORIGINAL 500 mg TbSO TAKE 1 TABLET BY MOUTH TWICE DAILY (Patient not taking: Reported on 4/27/2023)    loratadine (CLARITIN) 10 mg tablet Take 10 mg by mouth.    [DISCONTINUED] azithromycin (Z-KAYLEN) 250 MG tablet Take 250 mg by mouth.    [DISCONTINUED] B-complex with vitamin C (Z-BEC OR EQUIV) tablet Take 1 tablet by mouth once daily.    [DISCONTINUED] cloNIDine (CATAPRES) 0.1 MG tablet     [DISCONTINUED] cloNIDine 0.3 mg/24 hr td ptwk (CATAPRES) 0.3 mg/24 hr Place 1 patch onto the skin every 7 days.     [DISCONTINUED] finasteride (PROSCAR) 5 mg tablet Take 1 tablet (5 mg total) by mouth once daily.    [DISCONTINUED] folic acid-B lhdw-U-lmvac-zinc (DIALYVITE 3000) 3-70-15 mg-mcg-mg Tab Take by mouth.    [DISCONTINUED] lipase-protease-amylase (PANCREAZE) 21,000-54,700- 83,900 unit CpDR     [DISCONTINUED] magnesium gluconate 27.5 mg magne- sium (500 mg) Tab Take 500 mg by mouth.    [DISCONTINUED] naproxen (NAPROSYN) 500 MG tablet     [DISCONTINUED] polycarbophil (FIBERCON) 625 mg tablet Take by mouth.    [DISCONTINUED] SUTAB 1.479-0.188- 0.225 gram tablet Take by mouth.    [DISCONTINUED] vitamin D (VITAMIN D3) 1000 units Tab Take 1,000 Units by mouth.     Family History    None       Tobacco Use    Smoking status: Former     Types: Cigarettes    Smokeless tobacco: Never   Substance and Sexual Activity    Alcohol use: Yes     Alcohol/week: 0.0 standard drinks     Comment: occasional beer or wine monthly    Drug use: No    Sexual activity: Not on  file     Review of Systems   Constitutional: Negative.    HENT: Negative.     Eyes: Negative.    Respiratory:  Positive for shortness of breath.    Cardiovascular: Negative.    Gastrointestinal: Negative.    Endocrine: Negative.    Genitourinary: Negative.    Musculoskeletal: Negative.    Skin: Negative.    Allergic/Immunologic: Negative.    Neurological: Negative.    Hematological: Negative.    Objective:     Vital Signs (Most Recent):  Temp: 97.8 °F (36.6 °C) (04/27/23 2158)  Pulse: 68 (04/27/23 2158)  Resp: 18 (04/27/23 2158)  BP: (!) 161/80 (04/27/23 2158)  SpO2: 97 % (04/27/23 2158)   Vital Signs (24h Range):  Temp:  [97.8 °F (36.6 °C)-98.4 °F (36.9 °C)] 97.8 °F (36.6 °C)  Pulse:  [60-75] 68  Resp:  [12-21] 18  SpO2:  [95 %-97 %] 97 %  BP: (116-161)/(57-80) 161/80     Weight: 125.8 kg (277 lb 4.8 oz)  Body mass index is 40.95 kg/m².    Physical Exam  Vitals and nursing note reviewed. Exam conducted with a chaperone present.   Constitutional:       Appearance: He is obese.   HENT:      Head: Normocephalic and atraumatic.   Eyes:      Pupils: Pupils are equal, round, and reactive to light.   Cardiovascular:      Rate and Rhythm: Normal rate and regular rhythm.   Pulmonary:      Comments: Moderate air movement  No wheezes or crackles heard  Abdominal:      General: Bowel sounds are normal.      Palpations: Abdomen is soft.   Skin:     General: Skin is warm.      Capillary Refill: Capillary refill takes less than 2 seconds.   Neurological:      General: No focal deficit present.      Mental Status: He is alert.         CRANIAL NERVES     CN III, IV, VI   Pupils are equal, round, and reactive to light.     Significant Labs: All pertinent labs within the past 24 hours have been reviewed.    Significant Imaging: I have reviewed all pertinent imaging results/findings within the past 24 hours.

## 2023-04-28 NOTE — NURSING
Ambulated pt around nursing station x2 independently. SPO2 93-99% on room air during ambulation. Pt denied SOB during ambulation.

## 2023-04-28 NOTE — CONSULTS
"Pulmonary/Critical Care Consult      PATIENT NAME: Srinivasa White  MRN: [id number]  TODAY'S DATE: 2023  2:15 PM  ADMIT DATE: 2023  AGE: 72 y.o. : 1950    CONSULT REQUESTED BY: Gustabo Moses MD    REASON FOR CONSULT:   Shortness of breath    HPI:  The patient is a 72-year-old male followed by Dr. Kent who reports several months of dyspnea on exertion.  The patient states that after his COVID he had "major heart failure  but is on no cardiac meds.  The patient is morbidly obese and has sleep apnea.  He is not being treated for this.  Patient has severe orthopnea and has not been able to lie in bed for months.  Patient has postnasal drip and coughs this up.  The patient has a major substernal goiter but this does not appear to be causing any tracheal compression.    REVIEW OF SYSTEMS  GENERAL: Feeling okay  EYES: Vision is good.  ENT:  He has postnasal drip.  When he lies flat his entire head fills up with mucus he states  HEART: No chest pain or palpitations.  LUNGS:  The patient states he coughs up the mucus from his nose.  The patient denies any wheezing.  The patient is severely orthopneic.  The patient can not lie flat without his airway obstructing.  GI: No Nausea, vomiting, constipation, diarrhea, or reflux.  :  Prior to sleeping in a recliner, the patient was up every hour urinating.  Now he is able to sleep and only has nocturia x1 each night  SKIN: No lesions or rashes.  MUSCULOSKELETAL: No joint pain or myalgias.  NEURO: No headaches or neuropathy.  LYMPH: No edema or adenopathy.  PSYCH: No anxiety or depression.  ENDO: No weight change.    ALLERGIES  Review of patient's allergies indicates:   Allergen Reactions    Xeyrddmi-ilobdbvbckb-kbynjmoxs      Per pt, causes blood in urine.        INPATIENT SCHEDULED MEDICATIONS   albuterol-ipratropium  3 mL Nebulization Q6H    aspirin  81 mg Oral Daily    atorvastatin  20 mg Oral Daily    cloNIDine 0.3 mg/24 hr td ptwk  1 patch " Transdermal Q7 Days    diltiaZEM  240 mg Oral Daily    enoxparin  1 mg/kg Subcutaneous Q12H    finasteride  5 mg Oral Daily    losartan  50 mg Oral Daily    montelukast  10 mg Oral Daily    pantoprazole  40 mg Oral Before breakfast    spironolactone  25 mg Oral Daily    tamsulosin  1 capsule Oral QHS         MEDICAL AND SURGICAL HISTORY  Past Medical History:   Diagnosis Date    Arthritis     BPH (benign prostatic hyperplasia)     H/O colonoscopy 2005    Polyps removed    Hypertension      Past Surgical History:   Procedure Laterality Date    APPENDECTOMY  1974    LASER OF PROSTATE W/ GREEN LIGHT PVP  10/20/15    left leg Schrap medat  1970    TONSILLECTOMY  1958    WISDOM TOOTH EXTRACTION  1964       ALCOHOL, TOBACCO AND DRUG USE  Social History     Tobacco Use   Smoking Status Former    Types: Cigarettes   Smokeless Tobacco Never     Social History     Substance and Sexual Activity   Alcohol Use Yes    Alcohol/week: 0.0 standard drinks    Comment: occasional beer or wine monthly     Social History     Substance and Sexual Activity   Drug Use No       FAMILY HISTORY  History reviewed. No pertinent family history.    VITAL SIGNS (MOST RECENT)  Temp: 97.9 °F (36.6 °C) (04/28/23 1122)  Pulse: 64 (04/28/23 1327)  Resp: 16 (04/28/23 1327)  BP: 135/70 (04/28/23 1122)  SpO2: 97 % (04/28/23 1327)    INTAKE AND OUTPUT (LAST 24 HOURS):  Intake/Output Summary (Last 24 hours) at 4/28/2023 1415  Last data filed at 4/28/2023 0817  Gross per 24 hour   Intake 845 ml   Output 250 ml   Net 595 ml       WEIGHT  Wt Readings from Last 1 Encounters:   04/27/23 125.8 kg (277 lb 4.8 oz)       PHYSICAL EXAM  GENERAL: Older patient in no distress.  HEENT: Pupils equal and reactive. Extraocular movements intact. Nose intact. Pharynx moist.  He is a Mallampati 4  NECK: Supple.  Large  HEART: Regular rate and rhythm. No murmur or gallop auscultated.  LUNGS: Clear to auscultation and percussion. Lung excursion symmetrical. No change in  fremitus. No adventitial noises.  ABDOMEN: Bowel sounds present. Non-tender, no masses palpated.  : Normal anatomy.  EXTREMITIES: Normal muscle tone and joint movement, no cyanosis or clubbing.   LYMPHATICS: No adenopathy palpated, no edema.  The patient has compression stockings on.  SKIN: Dry, intact, no lesions.   NEURO: Cranial nerves II-XII intact. Motor strength 5/5 bilaterally, upper and lower extremities.  PSYCH: Appropriate affect        CBC LAST (LAST 24 HOURS)  Recent Labs   Lab 04/28/23  0354   WBC 10.39   RBC 4.44*   HGB 12.6*   HCT 38.7*   MCV 87   MCH 28.4   MCHC 32.6   RDW 13.3      MPV 9.8   GRAN 60.1  6.3   LYMPH 25.8  2.7   MONO 8.0  0.8   BASO 0.05   NRBC 0       CHEMISTRY LAST (LAST 24 HOURS)  Recent Labs   Lab 04/27/23  1724 04/28/23  0354    137   K 4.2 4.0    104   CO2 28 25   ANIONGAP 6* 8   BUN 20 21   CREATININE 1.1 1.0   * 124*   CALCIUM 9.3 9.0   MG  --  2.2   ALBUMIN 4.0  --    PROT 7.4  --    ALKPHOS 81  --    ALT 26  --    AST 20  --    BILITOT 0.5  --          CARDIAC PROFILE (LAST 24 HOURS)  Recent Labs   Lab 04/27/23 2042   BNP 7   TROPONINIHS 6.8       LAST 7 DAYS MICROBIOLOGY   Microbiology Results (last 7 days)       ** No results found for the last 168 hours. **            MOST RECENT IMAGING  CTA Chest Non-Coronary (PE Studies)  EXAM: CTA CHEST NON CORONARY (PE STUDIES)    HISTORY: Pulmonary embolism (PE) suspected, positive D-dimer    COMPARISON: CTA of the chest dated 2/19/2021    TECHNIQUE: Multiple axial 1 mm thick images were obtained through the chest during rapid IV injection of contrast. Multiple coronal and sagittal MIP reconstructions were produced.    This exam was performed according to our departmental dose-optimization program, which includes automated exposure control, adjustment of the mA and/or kV according to patient size and/or use of iterative reconstruction technique.    Unless otherwise stated, incidental findings do not  require dedicated follow-up imaging.    FINDINGS: The main pulmonary arteries and their lobar and segmental branches were somewhat poorly opacified. No pulmonary emboli identified in the main pulmonary arteries or their lobar or first-order segmental branches. There may be a small embolus within the proximal second order branches of the right lower lobe pulmonary artery seen on images 171-176. Correlation with clinical findings is recommended. The thoracic aorta was also opacified and appears within normal limits except for minimal calcified atherosclerotic plaque. There is extensive coronary artery calcification. The heart is borderline enlarged. There is no mediastinal or hilar or axillary lymphadenopathy. Lung window images demonstrate no parenchymal infiltrates or masses. There are no pleural effusions. Images through the upper abdomen show no obvious abnormalities.    IMPRESSION:   Suboptimal study due to poor opacification of the pulmonary arterial vasculature. No large pulmonary emboli identified in the main or segmental pulmonary arteries.. There is equivocal evidence of a small embolus within second order branches of the right lower lobe pulmonary artery. Extensive coronary calcification and borderline cardiomegaly.    Electronically signed by:  Rickey Agarwal MD  4/27/2023 6:46 PM CDT Workstation: [id number]  X-Ray Chest 1 View  EXAMINATION:    CLINICAL INDICATION:  Male, 72 years old. shortness of breath    COMPARISON:  October 19, 2021    FINDINGS:  The heart and great vessels appear normal. The trachea is displaced slightly to the right suggesting a left mediastinal mass such as thyroid goiter. That is stable as compared to previous. No evidence of hilar mass can be seen.    Both lungs are well expanded and clear.    The bilateral pleural effusion shown on the previous chest x-ray resolved. There is no evidence of pneumothorax.    IMPRESSION:  The trachea is displaced slightly to the right suggesting a  "mediastinal mass such as a substernal thyroid goiter.    No other specific abnormality identified.    Electronically signed by:  Paco Centeno Jr., MD  4/27/2023 5:17 PM CDT Workstation: [id number]      CURRENT VISIT EKG  Results for orders placed or performed during the hospital encounter of 04/27/23   EKG 12-lead    Narrative    Test Reason : R06.02,    Vent. Rate : 067 BPM     Atrial Rate : 067 BPM     P-R Int : 154 ms          QRS Dur : 078 ms      QT Int : 388 ms       P-R-T Axes : 042 072 061 degrees     QTc Int : 409 ms    Normal sinus rhythm  Normal ECG  When compared with ECG of 06-OCT-2015 10:38,  Premature atrial complexes are no longer Present  T wave inversion no longer evident in Inferior leads  Nonspecific T wave abnormality no longer evident in Lateral leads    Referred By: [email or web address]   SELF           Confirmed By:        ECHOCARDIOGRAM RESULTS  No results found for this or any previous visit.    Saturation is 97% on room air    IMPRESSION AND PLAN  Shortness of breath  Morbid obesity  Obstructive sleep apnea, undoubtedly worse than the DAMIEN of 8 that was measured in the past  Substernal thyroid  History of " major heart failure"  Do not believe this patient has had a PE.    Ultrasound legs to rule out DVT which would increase the possibility that these vague opacities in the tertiary pulmonary artery are clots  Await echocardiogram to rule out pulmonary hypertension  Patient needs a repeat sleep study soon  Patient needs weight loss  Patient should follow up with Dr. Kent in the office    Angelica Arellano MD  Date of Service: 04/28/2023  2:15 PM   "

## 2023-04-28 NOTE — ASSESSMENT & PLAN NOTE
Body mass index is 40.95 kg/m². Morbid obesity complicates all aspects of disease management from diagnostic modalities to treatment. Weight loss encouraged and health benefits explained to patient.  Must considers hypoventilation syndrome secondary to obesity

## 2023-04-28 NOTE — CARE UPDATE
04/28/23 0102   Patient Assessment/Suction   Level of Consciousness (AVPU) alert   Respiratory Effort Unlabored   Expansion/Accessory Muscles/Retractions no use of accessory muscles;expansion symmetric;no retractions   All Lung Fields Breath Sounds diminished   PRE-TX-O2   Device (Oxygen Therapy) room air   SpO2 96 %   Pulse Oximetry Type Intermittent   Pulse 61   Resp 17   Aerosol Therapy   $ Aerosol Therapy Charges Aerosol Treatment   Daily Review of Necessity (SVN) completed   Respiratory Treatment Status (SVN) given   Treatment Route (SVN) mask;oxygen   Patient Position (SVN) semi-Spaulding's   Post Treatment Assessment (SVN) patient reports breathing improved   Signs of Intolerance (SVN) none   Breath Sounds Post-Respiratory Treatment   Post-treatment Heart Rate (beats/min) 63   Post-treatment Resp Rate (breaths/min) 17

## 2023-04-28 NOTE — CONSULTS
"[facility]  Adult Nutrition   Consult Note (Nutrition Education)     SUMMARY     Recommendations  Recommendation/Intervention:   1) Patient accepted handout on heart healthy and contact information for questions. Patient follows low sodium diet at home since he got  and is trying to eat more fresh vegetables as well. No questions related to his diet at this time.   2) Menu  to obtain daily meal preferences.   3) RD will continue to monitor.    Goals: Patient >75% of EEN/EPN  Nutrition Goal Status: new    Dietitian Rounds Brief  Patient reports great appetite, no c/o N/V. No food preferences at this time.     Diet order:   Current Diet Order: Cardiac     Evaluation of Received Nutrient/Fluid Intake  Energy Calories Required: meeting needs  Protein Required: meeting needs  Fluid Required: meeting needs  Tolerance: tolerating     % Intake of Estimated Energy Needs: 75 - 100 %  % Meal Intake: 75 - 100 %      Intake/Output Summary (Last 24 hours) at 4/28/2023 1707  Last data filed at 4/28/2023 1230  Gross per 24 hour   Intake 1325 ml   Output 250 ml   Net 1075 ml        Anthropometrics  Temp: 97.8 °F (36.6 °C)  Height Method: Stated  Height: 5' 9" (175.3 cm)  Height (inches): 69 in  Weight Method: Standard Scale  Weight: 125.8 kg (277 lb 4.8 oz)  Weight (lb): 277.3 lb  Ideal Body Weight (IBW), Male: 160 lb  % Ideal Body Weight, Male (lb): 173.31 %  BMI (Calculated): 40.9  BMI Grade: greater than 40 - morbid obesity       Estimated/Assessed Needs  Weight Used For Calorie Calculations: 126 kg (277 lb 12.5 oz)  Energy Calorie Requirements (kcal): 5511-1166 (15-20)  Energy Need Method: Kcal/kg  Protein Requirements: 146-183gm (2-2.5gm/kg IBW)  Weight Used For Protein Calculations: 73 kg (160 lb 15 oz) (IBW)     Estimated Fluid Requirement Method: RDA Method  RDA Method (mL): 1890       Reason for Assessment  Reason For Assessment: consult  Diagnosis:  (Pulmonary embolus)  Relevant Medical " History: CHF, HTN, arthritis    Nutrition/Diet History  Patient Reported Diet/Restrictions/Preferences: low salt  Food Allergies: NKFA  Factors Affecting Nutritional Intake: None identified at this time    Nutrition Risk Screen  Nutrition Risk Screen: no indicators present     MST Score: 0  Have you recently lost weight without trying?: No  Weight loss score: 0  Have you been eating poorly because of a decreased appetite?: No  Appetite score: 0       Weight History:  Wt Readings from Last 10 Encounters:   04/27/23 125.8 kg (277 lb 4.8 oz)   04/28/23 125.6 kg (277 lb)   04/27/23 125.8 kg (277 lb 4.8 oz)   11/01/22 120.2 kg (265 lb)   07/20/22 112 kg (246 lb 14.6 oz)   01/23/22 112 kg (247 lb)   01/12/22 113.7 kg (250 lb 10.6 oz)   01/10/22 113.7 kg (250 lb 10.6 oz)   12/31/21 110.2 kg (243 lb)   11/01/21 110.2 kg (243 lb)        Lab/Procedures/Meds: Pertinent Labs/Meds Reviewed    Medications:Pertinent Medications Reviewed  Scheduled Meds:   albuterol-ipratropium  3 mL Nebulization Q6H    aspirin  81 mg Oral Daily    atorvastatin  20 mg Oral Daily    cloNIDine 0.3 mg/24 hr td ptwk  1 patch Transdermal Q7 Days    diltiaZEM  240 mg Oral Daily    enoxparin  1 mg/kg Subcutaneous Q12H    finasteride  5 mg Oral Daily    losartan  50 mg Oral Daily    montelukast  10 mg Oral Daily    pantoprazole  40 mg Oral Before breakfast    spironolactone  25 mg Oral Daily    tamsulosin  1 capsule Oral QHS     Continuous Infusions:  PRN Meds:.acetaminophen, albuterol-ipratropium, dextrose 50%, dextrose 50%, glucagon (human recombinant), glucose, glucose, melatonin, naloxone, ondansetron, polyethylene glycol, senna-docusate 8.6-50 mg, simethicone, sodium chloride 0.9%    Labs: Pertinent Labs Reviewed  Clinical Chemistry:  Recent Labs   Lab 04/27/23  1724 04/28/23  0354    137   K 4.2 4.0    104   CO2 28 25   * 124*   BUN 20 21   CREATININE 1.1 1.0   CALCIUM 9.3 9.0   PROT 7.4  --    ALBUMIN 4.0  --    BILITOT 0.5  --     ALKPHOS 81  --    AST 20  --    ALT 26  --    ANIONGAP 6* 8   MG  --  2.2     CBC:   Recent Labs   Lab 04/28/23  0354   WBC 10.39   RBC 4.44*   HGB 12.6*   HCT 38.7*      MCV 87   MCH 28.4   MCHC 32.6     Lipid Panel:  No results for input(s): CHOL, HDL, LDLCALC, TRIG, CHOLHDL in the last 168 hours.  Cardiac Profile:  Recent Labs   Lab 04/27/23  0951 04/27/23  2042   BNP 24 7     Inflammatory Labs:  No results for input(s): CRP in the last 168 hours.  Diabetes:  No results for input(s): HGBA1C, POCTGLUCOSE in the last 168 hours.  Thyroid & Parathyroid:  Recent Labs   Lab 04/27/23  0950   TSH <0.010*   FREET4 1.10       Monitor and Evaluation  Food and Nutrient Intake: energy intake  Food and Nutrient Adminstration: diet order  Knowledge/Beliefs/Attitudes: food and nutrition knowledge/skill, beliefs and attitudes  Physical Activity and Function: nutrition-related ADLs and IADLs  Anthropometric Measurements: weight change  Biochemical Data, Medical Tests and Procedures: electrolyte and renal panel, gastrointestinal profile, glucose/endocrine profile, inflammatory profile, lipid profile  Nutrition-Focused Physical Findings: overall appearance     Nutrition Risk  Level of Risk/Frequency of Follow-up: moderate     Nutrition Follow-Up  RD Follow-up?: Yes      Leticia Reyes RD 04/28/2023 5:07 PM

## 2023-04-28 NOTE — ASSESSMENT & PLAN NOTE
Reason for admission  Multiple etiologies to consider  Anticoagulation  Echocardiogram  Consult pulmonary  Consult Cardiology

## 2023-04-28 NOTE — HPI
The patient is a 72-year-old male, who presents emergency room with complaint of shortness of breath.  The patient states that he was sent from doctor's office today for potential blood clot.  Patient states that he is noted increased shortness breath for 4 weeks.  He denies any swelling of extremities or chest pain.  He admits to 3 pillow orthopnea and has noticed over last 4 weeks he has been unable to continue same exercise as before without resting.  The patient has a past medical history congestive heart failure after COVID injection 2 years ago.  There is no echocardiogram available in epic.  Patient has multiple reasons for shortness a breath to include COPD, obesity, sleep apnea and fluid overload.  Patient has a history of gross hematuria and was diagnosed nephrolithiasis.  No evidence of hematuria today.  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    Place patient in hospital for inpatient observation.  Anticoagulate.  Consult Pulmonary.  Consider involving Hematology as needed.  Consult Cardiology further evaluation and cardiac status.    Discussed with ER doctor and patient.

## 2023-04-28 NOTE — PLAN OF CARE
04/28/23 1429   Post-Acute Status   Post-Acute Authorization Medications   Medication Status Set-up complete/Auth obtained     Per [facility] pharmacy, Eliquis prescription in stock for pickup. $75.62/month. CM provided education about online coupon

## 2023-04-28 NOTE — ED PROVIDER NOTES
Encounter Date: 4/27/2023       History     Chief Complaint   Patient presents with    Shortness of Breath     Sent from  For concerns of possible blood clot in lung     HPI    Seen and evaluated.  Presents with a chief complaint of shortness of breath.  Sent from his primary care doctor for concerns of an elevated D-dimer and that these origin of his some shortness of breath could be a pulmonary embolism.  He notes some exertional dyspnea.  He denies any fever chills nausea vomiting or diarrhea.  He is otherwise in his usual state of health.  This is an acute episodic process.  It was prompted by the abnormal laboratory found by his primary care physician.  Symptoms moderate          Review of patient's allergies indicates:   Allergen Reactions    Ucoycage-kqtdhhirkhn-vafprhroh      Per pt, causes blood in urine.      Past Medical History:   Diagnosis Date    Arthritis     BPH (benign prostatic hyperplasia)     H/O colonoscopy 2005    Polyps removed    Hypertension      Past Surgical History:   Procedure Laterality Date    APPENDECTOMY  1974    LASER OF PROSTATE W/ GREEN LIGHT PVP  10/20/15    left leg Schrap medat  1970    TONSILLECTOMY  1958    WISDOM TOOTH EXTRACTION  1964     History reviewed. No pertinent family history.  Social History     Tobacco Use    Smoking status: Former     Types: Cigarettes    Smokeless tobacco: Never   Substance Use Topics    Alcohol use: Yes     Alcohol/week: 0.0 standard drinks     Comment: occasional beer or wine monthly    Drug use: No     Review of Systems   Constitutional:  Negative for fever.   Respiratory:  Positive for shortness of breath.    Cardiovascular:  Negative for chest pain.   Gastrointestinal:  Negative for nausea.     Physical Exam     Initial Vitals [04/27/23 1651]   BP Pulse Resp Temp SpO2   (!) 161/69 75 18 98.4 °F (36.9 °C) 95 %      MAP       --         Physical Exam    Nursing note and vitals reviewed.  Constitutional: He appears well-developed and  well-nourished.   HENT:   Head: Normocephalic and atraumatic.   Eyes: Conjunctivae are normal.   Cardiovascular:  Normal rate and regular rhythm.           Pulmonary/Chest: No respiratory distress.   Abdominal: Abdomen is soft.   Musculoskeletal:         General: Normal range of motion.     Neurological: He is alert and oriented to person, place, and time.   Skin: Skin is warm and dry.   Psychiatric: He has a normal mood and affect. His speech is normal.       ED Course   Procedures  Labs Reviewed   CBC W/ AUTO DIFFERENTIAL - Abnormal; Notable for the following components:       Result Value    RBC 4.58 (*)     Hemoglobin 13.1 (*)     Hematocrit 39.8 (*)     Immature Granulocytes 0.6 (*)     Immature Grans (Abs) 0.07 (*)     All other components within normal limits   COMPREHENSIVE METABOLIC PANEL - Abnormal; Notable for the following components:    Glucose 123 (*)     Anion Gap 6 (*)     All other components within normal limits   SARS-COV-2 RNA AMPLIFICATION, QUAL   TROPONIN I HIGH SENSITIVITY   B-TYPE NATRIURETIC PEPTIDE     EKG Readings: (Independently Interpreted)   Normal sinus rhythm 67 beats per minute no ST elevation no depressions.  Normal axis   ECG Results              EKG 12-lead (In process)  Result time 04/28/23 05:17:04      In process by Interface, Lab In [facility] (04/28/23 05:17:04)                   Narrative:    Test Reason : R06.02,    Vent. Rate : 063 BPM     Atrial Rate : 063 BPM     P-R Int : 184 ms          QRS Dur : 094 ms      QT Int : 404 ms       P-R-T Axes : 059 061 047 degrees     QTc Int : 413 ms    Normal sinus rhythm  Normal ECG  When compared with ECG of 27-APR-2023 17:01,  No significant change was found    Referred By: [email or web address]   SELF           Confirmed By:                                      EKG 12-lead (In process)  Result time 04/28/23 05:17:35      In process by Interface, Lab In [facility] (04/28/23 05:17:35)                   Narrative:    Test Reason : R06.02,    Vent. Rate  : 067 BPM     Atrial Rate : 067 BPM     P-R Int : 154 ms          QRS Dur : 078 ms      QT Int : 388 ms       P-R-T Axes : 042 072 061 degrees     QTc Int : 409 ms    Normal sinus rhythm  Normal ECG  When compared with ECG of 06-OCT-2015 10:38,  Premature atrial complexes are no longer Present  T wave inversion no longer evident in Inferior leads  Nonspecific T wave abnormality no longer evident in Lateral leads    Referred By: [email or web address]   SELF           Confirmed By:                                   Imaging Results              CTA Chest Non-Coronary (PE Studies) (Final result)  Result time 04/27/23 18:46:33      Final result by Charlie Agarwal MD (04/27/23 18:46:33)                   Narrative:      EXAM: CTA CHEST NON CORONARY (PE STUDIES)    HISTORY: Pulmonary embolism (PE) suspected, positive D-dimer    COMPARISON: CTA of the chest dated 2/19/2021    TECHNIQUE: Multiple axial 1 mm thick images were obtained through the chest during rapid IV injection of contrast. Multiple coronal and sagittal MIP reconstructions were produced.    This exam was performed according to our departmental dose-optimization program, which includes automated exposure control, adjustment of the mA and/or kV according to patient size and/or use of iterative reconstruction technique.    Unless otherwise stated, incidental findings do not require dedicated follow-up imaging.    FINDINGS: The main pulmonary arteries and their lobar and segmental branches were somewhat poorly opacified. No pulmonary emboli identified in the main pulmonary arteries or their lobar or first-order segmental branches. There may be a small embolus within the proximal second order branches of the right lower lobe pulmonary artery seen on images 171-176. Correlation with clinical findings is recommended. The thoracic aorta was also opacified and appears within normal limits except for minimal calcified atherosclerotic plaque. There is extensive coronary artery  calcification. The heart is borderline enlarged. There is no mediastinal or hilar or axillary lymphadenopathy. Lung window images demonstrate no parenchymal infiltrates or masses. There are no pleural effusions. Images through the upper abdomen show no obvious abnormalities.    IMPRESSION:   Suboptimal study due to poor opacification of the pulmonary arterial vasculature. No large pulmonary emboli identified in the main or segmental pulmonary arteries.. There is equivocal evidence of a small embolus within second order branches of the right lower lobe pulmonary artery. Extensive coronary calcification and borderline cardiomegaly.    Electronically signed by:  Rickey Agarwal MD  4/27/2023 6:46 PM CDT Workstation: [id number]                                     X-Ray Chest 1 View (Final result)  Result time 04/27/23 17:17:02      Final result by Paco Centeno Jr., MD (04/27/23 17:17:02)                   Narrative:    EXAMINATION:    CLINICAL INDICATION:  Male, 72 years old. shortness of breath    COMPARISON:  October 19, 2021    FINDINGS:  The heart and great vessels appear normal. The trachea is displaced slightly to the right suggesting a left mediastinal mass such as thyroid goiter. That is stable as compared to previous. No evidence of hilar mass can be seen.    Both lungs are well expanded and clear.    The bilateral pleural effusion shown on the previous chest x-ray resolved. There is no evidence of pneumothorax.    IMPRESSION:  The trachea is displaced slightly to the right suggesting a mediastinal mass such as a substernal thyroid goiter.    No other specific abnormality identified.    Electronically signed by:  Paco Centeno Jr., MD  4/27/2023 5:17 PM CDT Workstation: [id number]                                     Medications   aspirin EC tablet 81 mg (81 mg Oral Given 4/28/23 0908)   atorvastatin tablet 20 mg (20 mg Oral Given 4/28/23 0908)   cloNIDine 0.3 mg/24 hr td ptwk 1 patch (1 patch Transdermal  Not Given 4/28/23 0907)   diltiaZEM 24 hr capsule 240 mg (240 mg Oral Given 4/28/23 0908)   finasteride tablet 5 mg (5 mg Oral Not Given 4/28/23 0908)   losartan tablet 50 mg (50 mg Oral Not Given 4/28/23 0908)   pantoprazole EC tablet 40 mg (40 mg Oral Given 4/28/23 0537)   spironolactone tablet 25 mg (25 mg Oral Given 4/28/23 0909)   tamsulosin 24 hr capsule 0.4 mg (0.4 mg Oral Given 4/27/23 2235)   sodium chloride 0.9% flush 10 mL (has no administration in time range)   albuterol-ipratropium 2.5 mg-0.5 mg/3 mL nebulizer solution 3 mL (has no administration in time range)   melatonin tablet 6 mg (has no administration in time range)   ondansetron injection 4 mg (has no administration in time range)   polyethylene glycol packet 17 g (has no administration in time range)   senna-docusate 8.6-50 mg per tablet 1 tablet (has no administration in time range)   acetaminophen tablet 650 mg (has no administration in time range)   simethicone chewable tablet 80 mg (has no administration in time range)   naloxone 0.4 mg/mL injection 0.02 mg (has no administration in time range)   glucose chewable tablet 16 g (has no administration in time range)   glucose chewable tablet 24 g (has no administration in time range)   dextrose 50% injection 12.5 g (has no administration in time range)   dextrose 50% injection 25 g (has no administration in time range)   glucagon (human recombinant) injection 1 mg (has no administration in time range)   enoxaparin injection 120 mg (120 mg Subcutaneous Given 4/28/23 0915)   montelukast tablet 10 mg (10 mg Oral Given 4/28/23 0908)   albuterol-ipratropium 2.5 mg-0.5 mg/3 mL nebulizer solution 3 mL (3 mLs Nebulization Given 4/28/23 6819)   iohexoL (OMNIPAQUE 350) injection 100 mL (100 mLs Intravenous Given 4/27/23 2888)     Medical Decision Making:   Initial Assessment:   Seen and evaluated.  Presents with shortness of breath from his primary care office with an elevated D-dimer.  Given this  information my working diagnosis is rule out pulmonary embolism.  There could also be pneumonic process versus CHF.  Laboratory evaluation performed by his primary care physician advised support diagnosis CHF.  His BNP was not markedly elevated.  Here he does have exertional shortness of breath.  A CT of the chest was performed which showed a possible small pulmonary embolism.  I discussed the case with Hospital Medicine given he is symptomatic, will observe the patient further evaluation treatment.  He was admitted in guarded condition.    Reymundo Sandoval MD MPH  04/28/2023 1:26 PM                              Clinical Impression:   Final diagnoses:  [R06.02] Shortness of breath  [R06.02] SOB (shortness of breath)        ED Disposition Condition    Observation Stable                Reymundo Sandoval Jr., MD  04/28/23 7914

## 2023-04-28 NOTE — PLAN OF CARE
04/28/23 1036   SERRATO Message   Medicare Outpatient and Observation Notification regarding financial responsibility Explained to patient/caregiver;Signed/date by patient/caregiver   Date SERRATO was signed 04/28/23   Time SERRATO was signed 1036

## 2023-04-28 NOTE — CARE UPDATE
04/28/23 0721   Patient Assessment/Suction   Level of Consciousness (AVPU) alert   Respiratory Effort Unlabored;Normal   Expansion/Accessory Muscles/Retractions no retractions;expansion symmetric;no use of accessory muscles   All Lung Fields Breath Sounds Anterior:;Lateral:;diminished   Rhythm/Pattern, Respiratory unlabored;pattern regular;depth regular   PRE-TX-O2   SpO2 97 %   Pulse 71   Resp 18   Aerosol Therapy   $ Aerosol Therapy Charges Aerosol Treatment   Daily Review of Necessity (SVN) completed   Respiratory Treatment Status (SVN) given   Treatment Route (SVN) mask;oxygen   Patient Position (SVN) sitting on edge of bed   Post Treatment Assessment (SVN) breath sounds unchanged   Signs of Intolerance (SVN) none   Education   $ Education Bronchodilator;15 min   Respiratory Evaluation   $ Care Plan Tech Time 15 min   $ Eval/Re-eval Charges Re-evaluation

## 2023-04-28 NOTE — PLAN OF CARE
[facility]  Initial Discharge Assessment       Primary Care Provider: Yusuf Torres Iv, MD    Admission Diagnosis: Shortness of breath [R06.02]    Admission Date: 4/27/2023  Expected Discharge Date:      met with patient at bedside to complete initial assessment. Patient confirmed demographics, PCP, and preferred pharmacy listed below. DME confirmed and listed below. No Home Health. No Outpatient PT. No Dialysis. No Coumadin.    Discharge Barriers Identified: None    Payor: MEDICARE / Plan: MEDICARE PART A & B / Product Type: Government /     Extended Emergency Contact Information  Primary Emergency Contact: Dodie White  Address: [address] [place] [address] [place] [address] [facility]  Home Phone: [phone]  Mobile Phone: [phone]  Relation: Spouse    Discharge Plan A: Home with family  Discharge Plan B: Home with family      [id number] - [place], [place] - [address] AT [facility] [address]  [address]  [place] [address]  Phone: [phone] Fax: [phone]      Initial Assessment (most recent)       Adult Discharge Assessment - 04/28/23 1037          Discharge Assessment    Assessment Type Discharge Planning Assessment     Confirmed/corrected address, phone number and insurance Yes     Confirmed Demographics Correct on Facesheet     Source of Information patient     Does patient/caregiver understand observation status Yes     Communicated SYLVIA with patient/caregiver Date not available/Unable to determine     People in Home spouse     Facility Arrived From: Home     Do you expect to return to your current living situation? Yes     Do you have help at home or someone to help you manage your care at home? Yes     Who are your caregiver(s) and their phone number(s)? Spouse/Dodie [phone]     Prior to hospitilization cognitive status: Alert/Oriented     Current cognitive status: Alert/Oriented     Home Accessibility wheelchair  accessible     Home Layout Able to live on 1st floor     Equipment Currently Used at Home nebulizer     Readmission within 30 days? No     Patient currently being followed by outpatient case management? No     Do you currently have service(s) that help you manage your care at home? No     Do you take prescription medications? Yes     Do you have prescription coverage? Yes     Coverage Medicare     Do you have any problems affording any of your prescribed medications? No     Is the patient taking medications as prescribed? yes     Who is going to help you get home at discharge? Spouse/Dodie [phone]     How do you get to doctors appointments? car, drives self;family or friend will provide     Are you on dialysis? No     Do you take coumadin? No     Discharge Plan A Home with family     Discharge Plan B Home with family     DME Needed Upon Discharge  none     Discharge Plan discussed with: Patient     Discharge Barriers Identified None        OTHER    Name(s) of People in Home Spouse/Dodie [phone]

## 2023-04-28 NOTE — H&P
Critical access hospital Medicine  History & Physical    Patient Name: Srinivasa White  MRN: [id number]  Patient Class: OP- Observation  Admission Date: 4/27/2023  Attending Physician: Ilene Menchaca MD   Primary Care Provider: Yusuf Torres Iv, MD         Patient information was obtained from patient, past medical records and ER records.     Subjective:     Principal Problem:Pulmonary embolus    Chief Complaint:   Chief Complaint   Patient presents with    Shortness of Breath     Sent from  For concerns of possible blood clot in lung        HPI: The patient is a 72-year-old male, who presents emergency room with complaint of shortness of breath.  The patient states that he was sent from doctor's office today for potential blood clot.  Patient states that he is noted increased shortness breath for 4 weeks.  He denies any swelling of extremities or chest pain.  He admits to 3 pillow orthopnea and has noticed over last 4 weeks he has been unable to continue same exercise as before without resting.  The patient has a past medical history congestive heart failure after COVID injection 2 years ago.  There is no echocardiogram available in epic.  Patient has multiple reasons for shortness a breath to include COPD, obesity, sleep apnea and fluid overload.  Patient has a history of gross hematuria and was diagnosed nephrolithiasis.  No evidence of hematuria today.  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    Place patient in hospital for inpatient observation.  Anticoagulate.  Consult Pulmonary.  Consider involving Hematology as needed.  Consult Cardiology further evaluation and cardiac status.    Discussed with ER doctor and patient.    Patient with hyperthyroidism by TSH but normal T4.  Plan for thyroidectomy soon  Past Medical History:   Diagnosis Date    Arthritis     BPH (benign  prostatic hyperplasia)     H/O colonoscopy 2005    Polyps removed    Hypertension        Past Surgical History:   Procedure Laterality Date    APPENDECTOMY  1974    LASER OF PROSTATE W/ GREEN LIGHT PVP  10/20/15    left leg Mark medat  1970    TONSILLECTOMY  1958    WISDOM TOOTH EXTRACTION  1964       Review of patient's allergies indicates:   Allergen Reactions    Suqmwubf-ctamafyxgzl-vdqloouou      Per pt, causes blood in urine.        No current facility-administered medications on file prior to encounter.     Current Outpatient Medications on File Prior to Encounter   Medication Sig    aspirin (ECOTRIN) 81 MG EC tablet Take 81 mg by mouth once daily.    atorvastatin (LIPITOR) 20 MG tablet Take 20 mg by mouth once daily.    b complex vitamins capsule Take 1 capsule by mouth once daily.    benzonatate (TESSALON) 100 MG capsule Take 100 mg by mouth 3 (three) times daily as needed.    ciclopirox (LOPROX) 0.77 % Crea Apply 1 application topically daily as needed.    COCONUT OIL ORAL Take 1 capsule by mouth once daily.    diltiaZEM HCl (CARDIZEM LA) 240 mg 24 hr tablet Take 240 mg by mouth once daily.    felodipine (PLENDIL) 5 MG 24 hr tablet Take 5 mg by mouth once daily.    glucosamine-chondroitin 500-400 mg tablet Take 1 tablet by mouth 2 (two) times a day. 1500mg/MSM 1500mg    indapamide (LOZOL) 2.5 MG Tab Take 2.5 mg by mouth once daily.    inulin (FIBER GUMMIES ORAL) Take 1 capsule by mouth once daily.    Lactobacillus rhamnosus GG (CULTURELLE) 10 billion cell capsule Take 1 capsule by mouth once daily.    levocetirizine (XYZAL) 5 MG tablet Take 5 mg by mouth.    magnesium citrate 100 mg Cap Take 1 capsule by mouth.    olmesartan (BENICAR) 40 MG tablet Take 40 mg by mouth once daily.    omeprazole (PRILOSEC) 40 MG capsule Take 40 mg by mouth once daily.    spironolactone (ALDACTONE) 25 MG tablet Take 25 mg by mouth once daily.    tamsulosin (FLOMAX) 0.4 mg Cap Take 1 capsule by mouth every evening.     [DISCONTINUED] bran/gum/fib/dee dee/psyl/kelp/pec (FIBER 6 ORAL) Take 1 capsule by mouth once daily.    acetaminophen (TYLENOL) 500 MG tablet Take 2 tablets by mouth every 8 (eight) hours as needed.    calcium citrate malate-vit D3 (OSCAL) 250 mg-2.5 mcg (100 unit) Tab Take 1 tablet by mouth.    fexofenadine (ALLEGRA) 180 MG tablet Indications: inflammation of the nose due to an allergy. Take one every morning    K-PHOS ORIGINAL 500 mg TbSO TAKE 1 TABLET BY MOUTH TWICE DAILY (Patient not taking: Reported on 4/27/2023)    loratadine (CLARITIN) 10 mg tablet Take 10 mg by mouth.    [DISCONTINUED] azithromycin (Z-KAYLEN) 250 MG tablet Take 250 mg by mouth.    [DISCONTINUED] B-complex with vitamin C (Z-BEC OR EQUIV) tablet Take 1 tablet by mouth once daily.    [DISCONTINUED] cloNIDine (CATAPRES) 0.1 MG tablet     [DISCONTINUED] cloNIDine 0.3 mg/24 hr td ptwk (CATAPRES) 0.3 mg/24 hr Place 1 patch onto the skin every 7 days.     [DISCONTINUED] finasteride (PROSCAR) 5 mg tablet Take 1 tablet (5 mg total) by mouth once daily.    [DISCONTINUED] folic acid-B rsny-U-oqmka-zinc (DIALYVITE 3000) 3-70-15 mg-mcg-mg Tab Take by mouth.    [DISCONTINUED] lipase-protease-amylase (PANCREAZE) 21,000-54,700- 83,900 unit CpDR     [DISCONTINUED] magnesium gluconate 27.5 mg magne- sium (500 mg) Tab Take 500 mg by mouth.    [DISCONTINUED] naproxen (NAPROSYN) 500 MG tablet     [DISCONTINUED] polycarbophil (FIBERCON) 625 mg tablet Take by mouth.    [DISCONTINUED] SUTAB 1.479-0.188- 0.225 gram tablet Take by mouth.    [DISCONTINUED] vitamin D (VITAMIN D3) 1000 units Tab Take 1,000 Units by mouth.     Family History    None       Tobacco Use    Smoking status: Former     Types: Cigarettes    Smokeless tobacco: Never   Substance and Sexual Activity    Alcohol use: Yes     Alcohol/week: 0.0 standard drinks     Comment: occasional beer or wine monthly    Drug use: No    Sexual activity: Not on file     Review of Systems   Constitutional: Negative.    HENT:  Negative.     Eyes: Negative.    Respiratory:  Positive for shortness of breath.    Cardiovascular: Negative.    Gastrointestinal: Negative.    Endocrine: Negative.    Genitourinary: Negative.    Musculoskeletal: Negative.    Skin: Negative.    Allergic/Immunologic: Negative.    Neurological: Negative.    Hematological: Negative.    Objective:     Vital Signs (Most Recent):  Temp: 97.8 °F (36.6 °C) (04/27/23 2158)  Pulse: 68 (04/27/23 2158)  Resp: 18 (04/27/23 2158)  BP: (!) 161/80 (04/27/23 2158)  SpO2: 97 % (04/27/23 2158)   Vital Signs (24h Range):  Temp:  [97.8 °F (36.6 °C)-98.4 °F (36.9 °C)] 97.8 °F (36.6 °C)  Pulse:  [60-75] 68  Resp:  [12-21] 18  SpO2:  [95 %-97 %] 97 %  BP: (116-161)/(57-80) 161/80     Weight: 125.8 kg (277 lb 4.8 oz)  Body mass index is 40.95 kg/m².    Physical Exam  Vitals and nursing note reviewed. Exam conducted with a chaperone present.   Constitutional:       Appearance: He is obese.   HENT:      Head: Normocephalic and atraumatic.   Eyes:      Pupils: Pupils are equal, round, and reactive to light.   Cardiovascular:      Rate and Rhythm: Normal rate and regular rhythm.   Pulmonary:      Comments: Moderate air movement  No wheezes or crackles heard  Abdominal:      General: Bowel sounds are normal.      Palpations: Abdomen is soft.   Skin:     General: Skin is warm.      Capillary Refill: Capillary refill takes less than 2 seconds.   Neurological:      General: No focal deficit present.      Mental Status: He is alert.         CRANIAL NERVES     CN III, IV, VI   Pupils are equal, round, and reactive to light.     Significant Labs: All pertinent labs within the past 24 hours have been reviewed.    Significant Imaging: I have reviewed all pertinent imaging results/findings within the past 24 hours.    Assessment/Plan:     * Pulmonary embolus  Reason for admission  Multiple etiologies to consider  Anticoagulation  Echocardiogram  Consult pulmonary  Consult Cardiology      Shortness of  breath  Shortness of breaths  Improves with rest  Consider etiology        Obstructive sleep apnea syndrome  Consider right heart strain  Obtain echocardiogram      Obesity  Body mass index is 40.95 kg/m². Morbid obesity complicates all aspects of disease management from diagnostic modalities to treatment. Weight loss encouraged and health benefits explained to patient.  Must considers hypoventilation syndrome secondary to obesity         Essential hypertension  Monitor and treat        VTE Risk Mitigation (From admission, onward)           Ordered     enoxaparin injection 120 mg  Every 12 hours (non-standard times)         04/27/23 2157     IP VTE HIGH RISK PATIENT  Once         04/27/23 2156     Place sequential compression device  Until discontinued         04/27/23 2156                       On 04/27/2023, patient should be placed in hospital observation services under my care.        Ilene Menchaca MD  Department of Hospital Medicine  [facility]

## 2023-04-28 NOTE — ED NOTES
Pt denies any pain. Not in acute stress. Denies any sob. Updated on plan of care. Will continue to monitor

## 2023-04-29 VITALS
WEIGHT: 272.63 LBS | SYSTOLIC BLOOD PRESSURE: 136 MMHG | HEART RATE: 75 BPM | DIASTOLIC BLOOD PRESSURE: 71 MMHG | BODY MASS INDEX: 40.38 KG/M2 | TEMPERATURE: 99 F | HEIGHT: 69 IN | OXYGEN SATURATION: 97 % | RESPIRATION RATE: 20 BRPM

## 2023-04-29 LAB
ANION GAP SERPL CALC-SCNC: 8 MMOL/L (ref 8–16)
BASOPHILS # BLD AUTO: 0.08 K/UL (ref 0–0.2)
BASOPHILS NFR BLD: 0.7 % (ref 0–1.9)
BUN SERPL-MCNC: 21 MG/DL (ref 8–23)
CALCIUM SERPL-MCNC: 9.4 MG/DL (ref 8.7–10.5)
CHLORIDE SERPL-SCNC: 103 MMOL/L (ref 95–110)
CO2 SERPL-SCNC: 27 MMOL/L (ref 23–29)
CREAT SERPL-MCNC: 1.1 MG/DL (ref 0.5–1.4)
DIFFERENTIAL METHOD: ABNORMAL
EOSINOPHIL # BLD AUTO: 0.6 K/UL (ref 0–0.5)
EOSINOPHIL NFR BLD: 5.2 % (ref 0–8)
ERYTHROCYTE [DISTWIDTH] IN BLOOD BY AUTOMATED COUNT: 13.4 % (ref 11.5–14.5)
EST. GFR  (NO RACE VARIABLE): >60 ML/MIN/1.73 M^2
GLUCOSE SERPL-MCNC: 134 MG/DL (ref 70–110)
HCT VFR BLD AUTO: 39.9 % (ref 40–54)
HGB BLD-MCNC: 13 G/DL (ref 14–18)
IMM GRANULOCYTES # BLD AUTO: 0.09 K/UL (ref 0–0.04)
IMM GRANULOCYTES NFR BLD AUTO: 0.8 % (ref 0–0.5)
LYMPHOCYTES # BLD AUTO: 2.4 K/UL (ref 1–4.8)
LYMPHOCYTES NFR BLD: 21.1 % (ref 18–48)
MAGNESIUM SERPL-MCNC: 2.1 MG/DL (ref 1.6–2.6)
MCH RBC QN AUTO: 28.4 PG (ref 27–31)
MCHC RBC AUTO-ENTMCNC: 32.6 G/DL (ref 32–36)
MCV RBC AUTO: 87 FL (ref 82–98)
MONOCYTES # BLD AUTO: 1 K/UL (ref 0.3–1)
MONOCYTES NFR BLD: 8.7 % (ref 4–15)
NEUTROPHILS # BLD AUTO: 7.3 K/UL (ref 1.8–7.7)
NEUTROPHILS NFR BLD: 63.5 % (ref 38–73)
NRBC BLD-RTO: 0 /100 WBC
PLATELET # BLD AUTO: 227 K/UL (ref 150–450)
PMV BLD AUTO: 10.1 FL (ref 9.2–12.9)
POTASSIUM SERPL-SCNC: 4.5 MMOL/L (ref 3.5–5.1)
RBC # BLD AUTO: 4.57 M/UL (ref 4.6–6.2)
SODIUM SERPL-SCNC: 138 MMOL/L (ref 136–145)
WBC # BLD AUTO: 11.42 K/UL (ref 3.9–12.7)

## 2023-04-29 PROCEDURE — 25000242 PHARM REV CODE 250 ALT 637 W/ HCPCS: Performed by: INTERNAL MEDICINE

## 2023-04-29 PROCEDURE — 25000003 PHARM REV CODE 250: Performed by: INTERNAL MEDICINE

## 2023-04-29 PROCEDURE — 99900035 HC TECH TIME PER 15 MIN (STAT)

## 2023-04-29 PROCEDURE — 63600175 PHARM REV CODE 636 W HCPCS: Performed by: INTERNAL MEDICINE

## 2023-04-29 PROCEDURE — 85025 COMPLETE CBC W/AUTO DIFF WBC: CPT | Performed by: INTERNAL MEDICINE

## 2023-04-29 PROCEDURE — 99900031 HC PATIENT EDUCATION (STAT)

## 2023-04-29 PROCEDURE — 94761 N-INVAS EAR/PLS OXIMETRY MLT: CPT

## 2023-04-29 PROCEDURE — 80048 BASIC METABOLIC PNL TOTAL CA: CPT | Performed by: INTERNAL MEDICINE

## 2023-04-29 PROCEDURE — 36415 COLL VENOUS BLD VENIPUNCTURE: CPT | Performed by: INTERNAL MEDICINE

## 2023-04-29 PROCEDURE — 94799 UNLISTED PULMONARY SVC/PX: CPT

## 2023-04-29 PROCEDURE — 83735 ASSAY OF MAGNESIUM: CPT | Performed by: INTERNAL MEDICINE

## 2023-04-29 PROCEDURE — 94640 AIRWAY INHALATION TREATMENT: CPT

## 2023-04-29 RX ADMIN — ATORVASTATIN CALCIUM 20 MG: 20 TABLET, FILM COATED ORAL at 09:04

## 2023-04-29 RX ADMIN — ENOXAPARIN SODIUM 120 MG: 60 INJECTION SUBCUTANEOUS at 09:04

## 2023-04-29 RX ADMIN — ASPIRIN 81 MG: 81 TABLET, COATED ORAL at 09:04

## 2023-04-29 RX ADMIN — FINASTERIDE 5 MG: 5 TABLET, FILM COATED ORAL at 09:04

## 2023-04-29 RX ADMIN — PANTOPRAZOLE SODIUM 40 MG: 40 TABLET, DELAYED RELEASE ORAL at 06:04

## 2023-04-29 RX ADMIN — DILTIAZEM HYDROCHLORIDE 240 MG: 240 CAPSULE, EXTENDED RELEASE ORAL at 09:04

## 2023-04-29 RX ADMIN — MONTELUKAST 10 MG: 10 TABLET, FILM COATED ORAL at 09:04

## 2023-04-29 RX ADMIN — SPIRONOLACTONE 25 MG: 25 TABLET ORAL at 09:04

## 2023-04-29 RX ADMIN — LOSARTAN POTASSIUM 50 MG: 50 TABLET, FILM COATED ORAL at 09:04

## 2023-04-29 RX ADMIN — IPRATROPIUM BROMIDE AND ALBUTEROL SULFATE 3 ML: .5; 3 SOLUTION RESPIRATORY (INHALATION) at 08:04

## 2023-04-29 NOTE — PLAN OF CARE
04/29/23 0838   Patient Assessment/Suction   Level of Consciousness (AVPU) alert   Respiratory Effort Unlabored   Expansion/Accessory Muscles/Retractions no use of accessory muscles   All Lung Fields Breath Sounds clear;diminished   Rhythm/Pattern, Respiratory unlabored;pattern regular;depth regular   Cough Frequency infrequent   Cough Type good;nonproductive   PRE-TX-O2   Device (Oxygen Therapy) room air   SpO2 97 %   Pulse Oximetry Type Intermittent   $ Pulse Oximetry - Multiple Charge Pulse Oximetry - Multiple   Pulse 75   Resp 20   Aerosol Therapy   $ Aerosol Therapy Charges Aerosol Treatment   Daily Review of Necessity (SVN) completed   Respiratory Treatment Status (SVN) given   Treatment Route (SVN) mask;oxygen   Patient Position (SVN) HOB elevated   Post Treatment Assessment (SVN) breath sounds unchanged   Signs of Intolerance (SVN) none   Breath Sounds Post-Respiratory Treatment   Throughout All Fields Post-Treatment All Fields   Throughout All Fields Post-Treatment absent   Post-treatment Heart Rate (beats/min) 76   Post-treatment Resp Rate (breaths/min) 20   Education   $ Education Bronchodilator;15 min   Respiratory Evaluation   $ Care Plan Tech Time 15 min   $ Eval/Re-eval Charges Re-evaluation   Evaluation For New Orders

## 2023-04-29 NOTE — SUBJECTIVE & OBJECTIVE
Interval History:  no new complaints.  Continues with shortness of breath with exertion.  To be seen by pulmonologist.    Review of Systems   Constitutional:  Negative for chills and fever.   Respiratory:  Positive for shortness of breath. Negative for cough and wheezing.    Cardiovascular:  Negative for chest pain and leg swelling.   Gastrointestinal:  Negative for abdominal pain and nausea.   Objective:     Vital Signs (Most Recent):  Temp: 98.7 °F (37.1 °C) (04/28/23 1935)  Pulse: (!) 50 (04/28/23 1937)  Resp: 18 (04/28/23 1937)  BP: (!) 153/70 (nurse notified - chandu marrufo) (04/28/23 1935)  SpO2: 95 % (04/28/23 1937)   Vital Signs (24h Range):  Temp:  [97.8 °F (36.6 °C)-98.7 °F (37.1 °C)] 98.7 °F (37.1 °C)  Pulse:  [50-72] 50  Resp:  [16-19] 18  SpO2:  [94 %-97 %] 95 %  BP: (129-161)/(62-80) 153/70     Weight: 125.8 kg (277 lb 4.8 oz)  Body mass index is 40.95 kg/m².    Intake/Output Summary (Last 24 hours) at 4/28/2023 2134  Last data filed at 4/28/2023 1230  Gross per 24 hour   Intake 1325 ml   Output 250 ml   Net 1075 ml      Physical Exam  Vitals reviewed.   Constitutional:       General: He is not in acute distress.     Appearance: He is not diaphoretic.   HENT:      Mouth/Throat:      Mouth: Mucous membranes are moist.   Eyes:      General: No scleral icterus.        Right eye: No discharge.         Left eye: No discharge.   Neck:      Vascular: No JVD.   Cardiovascular:      Rate and Rhythm: Normal rate and regular rhythm.   Pulmonary:      Effort: Pulmonary effort is normal.      Breath sounds: Normal breath sounds.   Abdominal:      General: Bowel sounds are normal. There is no distension.      Palpations: Abdomen is soft.      Tenderness: There is no abdominal tenderness.   Skin:     General: Skin is warm.      Findings: No rash.   Neurological:      Mental Status: He is alert.       Significant Labs: All pertinent labs within the past 24 hours have been reviewed.    Significant Imaging: I have reviewed all  pertinent imaging results/findings within the past 24 hours.

## 2023-04-29 NOTE — RESPIRATORY THERAPY
04/28/23 1937   Patient Assessment/Suction   Level of Consciousness (AVPU) alert   Respiratory Effort Unlabored   Expansion/Accessory Muscles/Retractions no use of accessory muscles   All Lung Fields Breath Sounds diminished   Cough Type good;nonproductive   PRE-TX-O2   Device (Oxygen Therapy) room air   SpO2 95 %   Pulse Oximetry Type Intermittent   $ Pulse Oximetry - Multiple Charge Pulse Oximetry - Multiple   Pulse (!) 50   Resp 18   Aerosol Therapy   $ Aerosol Therapy Charges Aerosol Treatment   Daily Review of Necessity (SVN) completed   Respiratory Treatment Status (SVN) given   Treatment Route (SVN) mask;oxygen   Patient Position (SVN) HOB elevated   Post Treatment Assessment (SVN) breath sounds unchanged   Signs of Intolerance (SVN) none   Breath Sounds Post-Respiratory Treatment   Post-treatment Heart Rate (beats/min) 53   Post-treatment Resp Rate (breaths/min) 18   Education   $ Education Bronchodilator;15 min   Respiratory Evaluation   $ Care Plan Tech Time 15 min   $ Eval/Re-eval Charges Re-evaluation

## 2023-04-29 NOTE — PROGRESS NOTES
Discharge instructions provided to pt, pt voiced understanding. IV/tele monitoring discontinued. Pt gathering belongings awaiting personal transportation.

## 2023-04-29 NOTE — HOSPITAL COURSE
Admitted with shortness of breath ultimately found to have equivocal evidence of a small embolus within second order branches of the right lower lobe pulmonary artery on CTA chest. Did not require supplemental O2. Started on therapeutic lovenox. Pulm was consulted. He was given breathing treatments as well. His symptoms greatly improved. Echo done and essentially normal. His symptoms are likely multifactorial and not completely attributable to the pulmonary emboli. He was strongly recommended to follow up with PCP and pulmonology. Eliquis on discharge. Encouraged weight loss and repeat sleep study. By time of discharge the patient was tolerating a regular diet with resolving admission symptoms. Patient seen and examined on day of discharge.    Physical exam on day of discharge:  General - Patient alert and oriented x3 in NAD, obese  CV - Regular rate and rhythm, No Murmur/olivia/rubs  Resp - Lungs CTA Bilaterally, No increased WOB  GI - BS normoactive, soft, non-tender/non-distended, no HSM  Extrem-  No cyanosis, clubbing, edema.   Skin -  No masses, rashes or lesions noted on cursory skin exam.

## 2023-04-29 NOTE — PROGRESS NOTES
[facility]  Progress Note    Patient Name: Srinivasa White  MRN: [id number]  Patient Class: IP- Inpatient   Admission Date: 4/27/2023  Length of Stay: 0 days  Attending Physician: Gustabo Moses MD  Primary Care Provider: Yusuf Torres Iv, MD        Subjective:     Principal Problem:Pulmonary embolus        HPI:  The patient is a 72-year-old male, who presents emergency room with complaint of shortness of breath.  The patient states that he was sent from doctor's office today for potential blood clot.  Patient states that he is noted increased shortness breath for 4 weeks.  He denies any swelling of extremities or chest pain.  He admits to 3 pillow orthopnea and has noticed over last 4 weeks he has been unable to continue same exercise as before without resting.  The patient has a past medical history congestive heart failure after COVID injection 2 years ago.  There is no echocardiogram available in epic.  Patient has multiple reasons for shortness a breath to include COPD, obesity, sleep apnea and fluid overload.  Patient has a history of gross hematuria and was diagnosed nephrolithiasis.  No evidence of hematuria today.  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    Place patient in hospital for inpatient observation.  Anticoagulate.  Consult Pulmonary.  Consider involving Hematology as needed.  Consult Cardiology further evaluation and cardiac status.    Discussed with ER doctor and patient.      Overview/Hospital Course:  No notes on file    Interval History:  no new complaints.  Continues with shortness of breath with exertion.  To be seen by pulmonologist.    Review of Systems   Constitutional:  Negative for chills and fever.   Respiratory:  Positive for shortness of breath. Negative for cough and wheezing.    Cardiovascular:  Negative for chest pain  and leg swelling.   Gastrointestinal:  Negative for abdominal pain and nausea.   Objective:     Vital Signs (Most Recent):  Temp: 98.7 °F (37.1 °C) (04/28/23 1935)  Pulse: (!) 50 (04/28/23 1937)  Resp: 18 (04/28/23 1937)  BP: (!) 153/70 (nurse notified - chandu marrufo) (04/28/23 1935)  SpO2: 95 % (04/28/23 1937)   Vital Signs (24h Range):  Temp:  [97.8 °F (36.6 °C)-98.7 °F (37.1 °C)] 98.7 °F (37.1 °C)  Pulse:  [50-72] 50  Resp:  [16-19] 18  SpO2:  [94 %-97 %] 95 %  BP: (129-161)/(62-80) 153/70     Weight: 125.8 kg (277 lb 4.8 oz)  Body mass index is 40.95 kg/m².    Intake/Output Summary (Last 24 hours) at 4/28/2023 2134  Last data filed at 4/28/2023 1230  Gross per 24 hour   Intake 1325 ml   Output 250 ml   Net 1075 ml      Physical Exam  Vitals reviewed.   Constitutional:       General: He is not in acute distress.     Appearance: He is not diaphoretic.   HENT:      Mouth/Throat:      Mouth: Mucous membranes are moist.   Eyes:      General: No scleral icterus.        Right eye: No discharge.         Left eye: No discharge.   Neck:      Vascular: No JVD.   Cardiovascular:      Rate and Rhythm: Normal rate and regular rhythm.   Pulmonary:      Effort: Pulmonary effort is normal.      Breath sounds: Normal breath sounds.   Abdominal:      General: Bowel sounds are normal. There is no distension.      Palpations: Abdomen is soft.      Tenderness: There is no abdominal tenderness.   Skin:     General: Skin is warm.      Findings: No rash.   Neurological:      Mental Status: He is alert.       Significant Labs: All pertinent labs within the past 24 hours have been reviewed.    Significant Imaging: I have reviewed all pertinent imaging results/findings within the past 24 hours.      Assessment/Plan:      * Pulmonary embolus  CTA report reviewed.  Will continue anticoagulation for now.      Shortness of breath  Mainly with exertion.  Will consult with pulmonology for opinion on what's causing it.      Obstructive sleep apnea  syndrome  Consider right heart strain and/or pulmonary hypertension.  To get echocardiogram      Morbid obesity  Noted.  Pt encouraged to lose weight.        VTE Risk Mitigation (From admission, onward)         Ordered     enoxaparin injection 120 mg  Every 12 hours (non-standard times)         04/27/23 2157     IP VTE HIGH RISK PATIENT  Once         04/27/23 2156     Place sequential compression device  Until discontinued         04/27/23 2156                Discharge Planning   SYLVIA: 4/29/2023     Code Status: Full Code   Is the patient medically ready for discharge?:     Reason for patient still in hospital (select all that apply): Patient trending condition, Treatment and Consult recommendations  Discharge Plan A: Home with family                  Gustabo Moses MD  Department of Hospital Medicine   [facility]

## 2023-04-29 NOTE — PLAN OF CARE
04/29/23 0843   Final Note   Assessment Type Final Discharge Note   What phone number can be called within the next 1-3 days to see how you are doing after discharge? [phone]   Post-Acute Status   Discharge Delays None known at this time     Patient cleared for discharge from case management standpoint.    Chart and discharge orders reviewed.  Patient discharged home with no further case management needs.

## 2023-04-29 NOTE — DISCHARGE SUMMARY
[facility] Medicine  Discharge Summary      Patient Name: Srinivasa White  MRN: [id number]  [facility]: [id number]  Patient Class: IP- Inpatient  Admission Date: 4/27/2023  Hospital Length of Stay: 1 days  Discharge Date and Time: 4/29/2023 11:55 AM  Attending Physician: Douglas Francois MD   Discharging Provider: Douglas Francois MD  Primary Care Provider: Yusuf Torres Iv, MD    Primary Care Team: Networked reference to record PCT     HPI:   The patient is a 72-year-old male, who presents emergency room with complaint of shortness of breath.  The patient states that he was sent from doctor's office today for potential blood clot.  Patient states that he is noted increased shortness breath for 4 weeks.  He denies any swelling of extremities or chest pain.  He admits to 3 pillow orthopnea and has noticed over last 4 weeks he has been unable to continue same exercise as before without resting.  The patient has a past medical history congestive heart failure after COVID injection 2 years ago.  There is no echocardiogram available in epic.  Patient has multiple reasons for shortness a breath to include COPD, obesity, sleep apnea and fluid overload.  Patient has a history of gross hematuria and was diagnosed nephrolithiasis.  No evidence of hematuria today.  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    Place patient in hospital for inpatient observation.  Anticoagulate.  Consult Pulmonary.  Consider involving Hematology as needed.  Consult Cardiology further evaluation and cardiac status.    Discussed with ER doctor and patient.      * No surgery found *      Hospital Course:   Admitted with shortness of breath ultimately found to have equivocal evidence of a small embolus within second order branches of the right lower lobe pulmonary artery on CTA chest. Did not require supplemental  O2. Started on therapeutic lovenox. Pulm was consulted. He was given breathing treatments as well. His symptoms greatly improved. Echo done and essentially normal. His symptoms are likely multifactorial and not completely attributable to the pulmonary emboli. He was strongly recommended to follow up with PCP and pulmonology. Eliquis on discharge. Encouraged weight loss and repeat sleep study. By time of discharge the patient was tolerating a regular diet with resolving admission symptoms. Patient seen and examined on day of discharge.    Physical exam on day of discharge:  General - Patient alert and oriented x3 in NAD, obese  CV - Regular rate and rhythm, No Murmur/olivia/rubs  Resp - Lungs CTA Bilaterally, No increased WOB  GI - BS normoactive, soft, non-tender/non-distended, no HSM  Extrem-  No cyanosis, clubbing, edema.   Skin -  No masses, rashes or lesions noted on cursory skin exam.         Goals of Care Treatment Preferences:  Code Status: Full Code      Consults:   Consults (From admission, onward)        Status Ordering Provider     Inpatient consult to Registered Dietitian/Nutritionist  Once        Provider:  (Not yet assigned)    Completed JUAN ARREOLA     Inpatient consult to Pulmonology  Once        Provider:  Jorgito Kent MD    Completed JUAN ARREOLA.          No new Assessment & Plan notes have been filed under this hospital service since the last note was generated.  Service: Hospital Medicine    Final Active Diagnoses:    Diagnosis Date Noted POA    PRINCIPAL PROBLEM:  Pulmonary embolus [I26.99] 04/27/2023 Yes    Shortness of breath [R06.02] 04/27/2023 Yes    Obstructive sleep apnea syndrome [G47.33] 11/01/2022 Yes    Morbid obesity [E66.01] 01/05/2018 Yes      Problems Resolved During this Admission:       Discharged Condition: good    Disposition: Home or Self Care    Follow Up:   Follow-up Information     Yusuf Torres Iv, MD. Schedule an appointment as soon as possible for a  visit in 1 week(s).    Specialty: Family Medicine  Contact information:  [address]   [place] [address]  [phone]             Jorgito Kent MD. Schedule an appointment as soon as possible for a visit in 2 week(s).    Specialty: Pulmonary Disease  Contact information:  [facility]  [id number]  [facility] [place] [address]  [phone]                       Patient Instructions:      Diet Cardiac     Notify your health care provider if you experience any of the following:  temperature >100.4     Notify your health care provider if you experience any of the following:  persistent nausea and vomiting or diarrhea     Notify your health care provider if you experience any of the following:  severe uncontrolled pain     Notify your health care provider if you experience any of the following:  redness, tenderness, or signs of infection (pain, swelling, redness, odor or green/yellow discharge around incision site)     Notify your health care provider if you experience any of the following:  difficulty breathing or increased cough     Notify your health care provider if you experience any of the following:  severe persistent headache     Notify your health care provider if you experience any of the following:  worsening rash     Notify your health care provider if you experience any of the following:  persistent dizziness, light-headedness, or visual disturbances     Notify your health care provider if you experience any of the following:  increased confusion or weakness     Activity as tolerated       Significant Diagnostic Studies:     Admission on 04/27/2023, Discharged on 04/29/2023   Component Date Value Ref Range Status    WBC 04/27/2023 11.01  3.90 - 12.70 K/uL Final    RBC 04/27/2023 4.58 (L)  4.60 - 6.20 M/uL Final    Hemoglobin 04/27/2023 13.1 (L)  14.0 - 18.0 g/dL Final    Hematocrit 04/27/2023 39.8 (L)  40.0 - 54.0 % Final    MCV 04/27/2023 87  82 - 98 fL Final    MCH  04/27/2023 28.6  27.0 - 31.0 pg Final    MCHC 04/27/2023 32.9  32.0 - 36.0 g/dL Final    RDW 04/27/2023 13.3  11.5 - 14.5 % Final    Platelets 04/27/2023 226  150 - 450 K/uL Final    MPV 04/27/2023 10.3  9.2 - 12.9 fL Final    Immature Granulocytes 04/27/2023 0.6 (H)  0.0 - 0.5 % Final    Gran # (ANC) 04/27/2023 6.8  1.8 - 7.7 K/uL Final    Immature Grans (Abs) 04/27/2023 0.07 (H)  0.00 - 0.04 K/uL Final    Comment: Mild elevation in immature granulocytes is non specific and   can be seen in a variety of conditions including stress response,   acute inflammation, trauma and pregnancy. Correlation with other   laboratory and clinical findings is essential.      Lymph # 04/27/2023 2.5  1.0 - 4.8 K/uL Final    Mono # 04/27/2023 1.0  0.3 - 1.0 K/uL Final    Eos # 04/27/2023 0.5  0.0 - 0.5 K/uL Final    Baso # 04/27/2023 0.07  0.00 - 0.20 K/uL Final    nRBC 04/27/2023 0  0 /100 WBC Final    Gran % 04/27/2023 62.0  38.0 - 73.0 % Final    Lymph % 04/27/2023 23.1  18.0 - 48.0 % Final    Mono % 04/27/2023 9.2  4.0 - 15.0 % Final    Eosinophil % 04/27/2023 4.5  0.0 - 8.0 % Final    Basophil % 04/27/2023 0.6  0.0 - 1.9 % Final    Differential Method 04/27/2023 Automated   Final    Sodium 04/27/2023 138  136 - 145 mmol/L Final    Potassium 04/27/2023 4.2  3.5 - 5.1 mmol/L Final    Chloride 04/27/2023 104  95 - 110 mmol/L Final    CO2 04/27/2023 28  23 - 29 mmol/L Final    Glucose 04/27/2023 123 (H)  70 - 110 mg/dL Final    BUN 04/27/2023 20  8 - 23 mg/dL Final    Creatinine 04/27/2023 1.1  0.5 - 1.4 mg/dL Final    Calcium 04/27/2023 9.3  8.7 - 10.5 mg/dL Final    Total Protein 04/27/2023 7.4  6.0 - 8.4 g/dL Final    Albumin 04/27/2023 4.0  3.5 - 5.2 g/dL Final    Total Bilirubin 04/27/2023 0.5  0.1 - 1.0 mg/dL Final    Comment: For infants and newborns, interpretation of results should be based  on gestational age, weight and in agreement with clinical  observations.    Premature Infant recommended  reference ranges:  Up to 24 hours.............<8.0 mg/dL  Up to 48 hours............<12.0 mg/dL  3-5 days..................<15.0 mg/dL  6-29 days.................<15.0 mg/dL      Alkaline Phosphatase 04/27/2023 81  55 - 135 U/L Final    AST 04/27/2023 20  10 - 40 U/L Final    ALT 04/27/2023 26  10 - 44 U/L Final    Anion Gap 04/27/2023 6 (L)  8 - 16 mmol/L Final    eGFR 04/27/2023 >60.0  >60 mL/min/1.73 m^2 Final    SARS-CoV-2 RNA, Amplification, Qual 04/27/2023 Negative  Negative Final    Comment: This test utilizes isothermal nucleic acid amplification technology   to   detect the SARS-CoV-2 RdRp nucleic acid segment. The analytical   sensitivity   (limit of detection) is 500 copies/swab.     A POSITIVE result is indicative of the presence of SARS-CoV-2 RNA;   clinical   correlation with patient history and other diagnostic information is   necessary to determine patient infection status.    A NEGATIVE result means that SARS-CoV-2 nucleic acids are not present   above   the limit of detection. A NEGATIVE result should be treated as   presumptive.   It does not rule out the possibility of COVID-19 and should not be   the sole   basis for treatment decisions. If COVID-19 is strongly suspected   based on   clinical and exposure history, re-testing using an alternate   molecular assay   should be considered.     This test is only for use under the Food and Drug Administration s   Emergency   Use Authorization (EUA).     Commercial kits are provided by [employer]. Performanc                           e   characteristics of the EUA have been independently verified by   [facility]  _________________________________________________________________   The authorized Fact Sheet for Healthcare Providers and the authorized   Fact   Sheet for Patients of the ID NOW COVID-19 are available on the FDA   website:   https://www.fda.gov/media/543056/download    https://www.fda.gov/media/856819/download      Troponin I High Sensitivity 04/27/2023 6.8  0.0 - 14.9 pg/mL Final    Comment: Troponin results differ between methods. Do not use   results between Troponin methods interchangeably.    Alkaline Phospatase levels above 400 U/L may   cause false positive results.    Access hsTnI should not be used for patients taking   Asfotase sara (Strensiq).      BNP 04/27/2023 7  0 - 99 pg/mL Final    Values of less than 100 pg/ml are consistent with non-CHF populations.    BSA 04/28/2023 2.47  m2 Final    TDI SEPTAL 04/28/2023 0.08  m/s Final    LV LATERAL E/E' RATIO 04/28/2023 13.00  m/s Final    LV SEPTAL E/E' RATIO 04/28/2023 9.75  m/s Final    Left Ventricular Outflow Tract Judy* 04/28/2023 0.69  cm/s Final    Left Ventricular Outflow Tract Judy* 04/28/2023 2.00  mmHg Final    Pulmonary Valve Mean Velocity 04/28/2023 0.67  m/s Final    AORTIC VALVE CUSP SEPERATION 04/28/2023 2.60  cm Final    TDI LATERAL 04/28/2023 0.06  m/s Final    PV PEAK VELOCITY 04/28/2023 0.96  cm/s Final    LVIDd 04/28/2023 5.13  3.5 - 6.0 cm Final    IVS 04/28/2023 1.09  0.6 - 1.1 cm Final    Posterior Wall 04/28/2023 0.98  0.6 - 1.1 cm Final    Ao root annulus 04/28/2023 3.90  cm Final    LVIDs 04/28/2023 3.52  2.1 - 4.0 cm Final    FS 04/28/2023 31  28 - 44 % Final    LV mass 04/28/2023 198.82  g Final    TAPSE 04/28/2023 2.57  cm Final    RV S' 04/28/2023 0.02  cm/s Final    Left Ventricle Relative Wall Thick* 04/28/2023 0.38  cm Final    AV mean gradient 04/28/2023 5  mmHg Final    AV valve area 04/28/2023 2.36  cm2 Final    AV Velocity Ratio 04/28/2023 0.71   Final    AV index (prosthetic) 04/28/2023 0.75   Final    MV mean gradient 04/28/2023 2  mmHg Final    MV valve area p 1/2 method 04/28/2023 2.56  cm2 Final    MV valve area by continuity eq 04/28/2023 2.00  cm2 Final    E/A ratio 04/28/2023 0.77   Final    Mean e' 04/28/2023 0.07  m/s Final    E wave  deceleration time 04/28/2023 257.00  msec Final    LVOT diameter 04/28/2023 2.00  cm Final    LVOT area 04/28/2023 3.1  cm2 Final    LVOT peak avni 04/28/2023 1.05  m/s Final    LVOT peak VTI 04/28/2023 21.00  cm Final    Ao peak avni 04/28/2023 1.48  m/s Final    Ao VTI 04/28/2023 28.0  cm Final    LVOT stroke volume 04/28/2023 65.94  cm3 Final    AV peak gradient 04/28/2023 9  mmHg Final    MV peak gradient 04/28/2023 5  mmHg Final    E/E' ratio 04/28/2023 11.14  m/s Final    MV Peak E Avni 04/28/2023 0.78  m/s Final    MV VTI 04/28/2023 32.9  cm Final    MV stenosis pressure 1/2 time 04/28/2023 86.00  ms Final    MV Peak A Avni 04/28/2023 1.01  m/s Final    LV Systolic Volume 04/28/2023 51.60  mL Final    LV Systolic Volume Index 04/28/2023 21.8  mL/m2 Final    LV Diastolic Volume 04/28/2023 126.00  mL Final    LV Diastolic Volume Index 04/28/2023 53.16  mL/m2 Final    LV Mass Index 04/28/2023 84  g/m2 Final    EF 04/28/2023 60  % Final    Sodium 04/28/2023 137  136 - 145 mmol/L Final    Potassium 04/28/2023 4.0  3.5 - 5.1 mmol/L Final    Chloride 04/28/2023 104  95 - 110 mmol/L Final    CO2 04/28/2023 25  23 - 29 mmol/L Final    Glucose 04/28/2023 124 (H)  70 - 110 mg/dL Final    BUN 04/28/2023 21  8 - 23 mg/dL Final    Creatinine 04/28/2023 1.0  0.5 - 1.4 mg/dL Final    Calcium 04/28/2023 9.0  8.7 - 10.5 mg/dL Final    Anion Gap 04/28/2023 8  8 - 16 mmol/L Final    eGFR 04/28/2023 >60.0  >60 mL/min/1.73 m^2 Final    Magnesium 04/28/2023 2.2  1.6 - 2.6 mg/dL Final    WBC 04/28/2023 10.39  3.90 - 12.70 K/uL Final    RBC 04/28/2023 4.44 (L)  4.60 - 6.20 M/uL Final    Hemoglobin 04/28/2023 12.6 (L)  14.0 - 18.0 g/dL Final    Hematocrit 04/28/2023 38.7 (L)  40.0 - 54.0 % Final    MCV 04/28/2023 87  82 - 98 fL Final    MCH 04/28/2023 28.4  27.0 - 31.0 pg Final    MCHC 04/28/2023 32.6  32.0 - 36.0 g/dL Final    RDW 04/28/2023 13.3  11.5 - 14.5 % Final    Platelets 04/28/2023 202   150 - 450 K/uL Final    MPV 04/28/2023 9.8  9.2 - 12.9 fL Final    Immature Granulocytes 04/28/2023 0.6 (H)  0.0 - 0.5 % Final    Gran # (ANC) 04/28/2023 6.3  1.8 - 7.7 K/uL Final    Immature Grans (Abs) 04/28/2023 0.06 (H)  0.00 - 0.04 K/uL Final    Comment: Mild elevation in immature granulocytes is non specific and   can be seen in a variety of conditions including stress response,   acute inflammation, trauma and pregnancy. Correlation with other   laboratory and clinical findings is essential.      Lymph # 04/28/2023 2.7  1.0 - 4.8 K/uL Final    Mono # 04/28/2023 0.8  0.3 - 1.0 K/uL Final    Eos # 04/28/2023 0.5  0.0 - 0.5 K/uL Final    Baso # 04/28/2023 0.05  0.00 - 0.20 K/uL Final    nRBC 04/28/2023 0  0 /100 WBC Final    Gran % 04/28/2023 60.1  38.0 - 73.0 % Final    Lymph % 04/28/2023 25.8  18.0 - 48.0 % Final    Mono % 04/28/2023 8.0  4.0 - 15.0 % Final    Eosinophil % 04/28/2023 5.0  0.0 - 8.0 % Final    Basophil % 04/28/2023 0.5  0.0 - 1.9 % Final    Differential Method 04/28/2023 Automated   Final    Sodium 04/29/2023 138  136 - 145 mmol/L Final    Potassium 04/29/2023 4.5  3.5 - 5.1 mmol/L Final    Chloride 04/29/2023 103  95 - 110 mmol/L Final    CO2 04/29/2023 27  23 - 29 mmol/L Final    Glucose 04/29/2023 134 (H)  70 - 110 mg/dL Final    BUN 04/29/2023 21  8 - 23 mg/dL Final    Creatinine 04/29/2023 1.1  0.5 - 1.4 mg/dL Final    Calcium 04/29/2023 9.4  8.7 - 10.5 mg/dL Final    Anion Gap 04/29/2023 8  8 - 16 mmol/L Final    eGFR 04/29/2023 >60.0  >60 mL/min/1.73 m^2 Final    Magnesium 04/29/2023 2.1  1.6 - 2.6 mg/dL Final    WBC 04/29/2023 11.42  3.90 - 12.70 K/uL Final    RBC 04/29/2023 4.57 (L)  4.60 - 6.20 M/uL Final    Hemoglobin 04/29/2023 13.0 (L)  14.0 - 18.0 g/dL Final    Hematocrit 04/29/2023 39.9 (L)  40.0 - 54.0 % Final    MCV 04/29/2023 87  82 - 98 fL Final    MCH 04/29/2023 28.4  27.0 - 31.0 pg Final    MCHC 04/29/2023 32.6  32.0 - 36.0 g/dL Final     RDW 04/29/2023 13.4  11.5 - 14.5 % Final    Platelets 04/29/2023 227  150 - 450 K/uL Final    MPV 04/29/2023 10.1  9.2 - 12.9 fL Final    Immature Granulocytes 04/29/2023 0.8 (H)  0.0 - 0.5 % Final    Gran # (ANC) 04/29/2023 7.3  1.8 - 7.7 K/uL Final    Immature Grans (Abs) 04/29/2023 0.09 (H)  0.00 - 0.04 K/uL Final    Comment: Mild elevation in immature granulocytes is non specific and   can be seen in a variety of conditions including stress response,   acute inflammation, trauma and pregnancy. Correlation with other   laboratory and clinical findings is essential.      Lymph # 04/29/2023 2.4  1.0 - 4.8 K/uL Final    Mono # 04/29/2023 1.0  0.3 - 1.0 K/uL Final    Eos # 04/29/2023 0.6 (H)  0.0 - 0.5 K/uL Final    Baso # 04/29/2023 0.08  0.00 - 0.20 K/uL Final    nRBC 04/29/2023 0  0 /100 WBC Final    Gran % 04/29/2023 63.5  38.0 - 73.0 % Final    Lymph % 04/29/2023 21.1  18.0 - 48.0 % Final    Mono % 04/29/2023 8.7  4.0 - 15.0 % Final    Eosinophil % 04/29/2023 5.2  0.0 - 8.0 % Final    Basophil % 04/29/2023 0.7  0.0 - 1.9 % Final    Differential Method 04/29/2023 Automated   Final   Lab Visit on 04/27/2023   Component Date Value Ref Range Status    Sodium 04/27/2023 138  136 - 145 mmol/L Final    Potassium 04/27/2023 4.3  3.5 - 5.1 mmol/L Final    Chloride 04/27/2023 102  95 - 110 mmol/L Final    CO2 04/27/2023 28  23 - 29 mmol/L Final    Glucose 04/27/2023 117 (H)  70 - 110 mg/dL Final    BUN 04/27/2023 18  8 - 23 mg/dL Final    Creatinine 04/27/2023 1.0  0.5 - 1.4 mg/dL Final    Calcium 04/27/2023 9.4  8.7 - 10.5 mg/dL Final    Total Protein 04/27/2023 7.5  6.0 - 8.4 g/dL Final    Albumin 04/27/2023 4.0  3.5 - 5.2 g/dL Final    Total Bilirubin 04/27/2023 0.7  0.1 - 1.0 mg/dL Final    Comment: For infants and newborns, interpretation of results should be based  on gestational age, weight and in agreement with clinical  observations.    Premature Infant recommended reference ranges:  Up  to 24 hours.............<8.0 mg/dL  Up to 48 hours............<12.0 mg/dL  3-5 days..................<15.0 mg/dL  6-29 days.................<15.0 mg/dL      Alkaline Phosphatase 04/27/2023 81  55 - 135 U/L Final    AST 04/27/2023 19  10 - 40 U/L Final    ALT 04/27/2023 25  10 - 44 U/L Final    Anion Gap 04/27/2023 8  8 - 16 mmol/L Final    eGFR 04/27/2023 >60.0  >60 mL/min/1.73 m^2 Final    WBC 04/27/2023 10.68  3.90 - 12.70 K/uL Final    RBC 04/27/2023 4.77  4.60 - 6.20 M/uL Final    Hemoglobin 04/27/2023 13.5 (L)  14.0 - 18.0 g/dL Final    Hematocrit 04/27/2023 41.6  40.0 - 54.0 % Final    MCV 04/27/2023 87  82 - 98 fL Final    MCH 04/27/2023 28.3  27.0 - 31.0 pg Final    MCHC 04/27/2023 32.5  32.0 - 36.0 g/dL Final    RDW 04/27/2023 13.4  11.5 - 14.5 % Final    Platelets 04/27/2023 221  150 - 450 K/uL Final    MPV 04/27/2023 9.2  9.2 - 12.9 fL Final    Immature Granulocytes 04/27/2023 0.9 (H)  0.0 - 0.5 % Final    Gran # (ANC) 04/27/2023 6.1  1.8 - 7.7 K/uL Final    Immature Grans (Abs) 04/27/2023 0.10 (H)  0.00 - 0.04 K/uL Final    Comment: Mild elevation in immature granulocytes is non specific and   can be seen in a variety of conditions including stress response,   acute inflammation, trauma and pregnancy. Correlation with other   laboratory and clinical findings is essential.      Lymph # 04/27/2023 3.1  1.0 - 4.8 K/uL Final    Mono # 04/27/2023 0.8  0.3 - 1.0 K/uL Final    Eos # 04/27/2023 0.5  0.0 - 0.5 K/uL Final    Baso # 04/27/2023 0.07  0.00 - 0.20 K/uL Final    nRBC 04/27/2023 0  0 /100 WBC Final    Gran % 04/27/2023 57.0  38.0 - 73.0 % Final    Lymph % 04/27/2023 28.6  18.0 - 48.0 % Final    Mono % 04/27/2023 7.8  4.0 - 15.0 % Final    Eosinophil % 04/27/2023 5.0  0.0 - 8.0 % Final    Basophil % 04/27/2023 0.7  0.0 - 1.9 % Final    Differential Method 04/27/2023 Automated   Final    TSH 04/27/2023 <0.010 (L)  0.340 - 5.600 uIU/mL Final    D-Dimer 04/27/2023 1.14 (HH)   <0.50 mg/L FEU Final    Comment: The quantitative D-dimer assay should be used as an aid in   the diagnosis of deep vein thrombosis and pulmonary embolism  in patients with the appropriate presentation and clinical  history. The upper limit of the reference interval and the clinical   cut off   point are identical. Causes of a positive (>0.50 mg/L FEU) D-Dimer   test  include, but are not limited to: DVT, PE, DIC, thrombolytic   therapy, anticoagulant therapy, recent surgery, trauma, or   pregnancy, disseminated malignancy, aortic aneurysm, cirrhosis,  and severe infection. False negative results may occur in   patients with distal DVT.  Results confirmed, test repeated  D-Dimer result(s) called and verbal readback obtained from Shaq Hu RN with Dr. Kent's office. by IMP 04/27/2023 11:17      BNP 04/27/2023 24  0 - 99 pg/mL Final    Values of less than 100 pg/ml are consistent with non-CHF populations.    Free T4 04/27/2023 1.10  0.71 - 1.51 ng/dL Final         Imaging Results          CTA Chest Non-Coronary (PE Studies) (Final result)  Result time 04/27/23 18:46:33    Final result by Charlie Agarwal MD (04/27/23 18:46:33)                 Narrative:      EXAM: CTA CHEST NON CORONARY (PE STUDIES)    HISTORY: Pulmonary embolism (PE) suspected, positive D-dimer    COMPARISON: CTA of the chest dated 2/19/2021    TECHNIQUE: Multiple axial 1 mm thick images were obtained through the chest during rapid IV injection of contrast. Multiple coronal and sagittal MIP reconstructions were produced.    This exam was performed according to our departmental dose-optimization program, which includes automated exposure control, adjustment of the mA and/or kV according to patient size and/or use of iterative reconstruction technique.    Unless otherwise stated, incidental findings do not require dedicated follow-up imaging.    FINDINGS: The main pulmonary arteries and their lobar and segmental branches were somewhat  poorly opacified. No pulmonary emboli identified in the main pulmonary arteries or their lobar or first-order segmental branches. There may be a small embolus within the proximal second order branches of the right lower lobe pulmonary artery seen on images 171-176. Correlation with clinical findings is recommended. The thoracic aorta was also opacified and appears within normal limits except for minimal calcified atherosclerotic plaque. There is extensive coronary artery calcification. The heart is borderline enlarged. There is no mediastinal or hilar or axillary lymphadenopathy. Lung window images demonstrate no parenchymal infiltrates or masses. There are no pleural effusions. Images through the upper abdomen show no obvious abnormalities.    IMPRESSION:   Suboptimal study due to poor opacification of the pulmonary arterial vasculature. No large pulmonary emboli identified in the main or segmental pulmonary arteries.. There is equivocal evidence of a small embolus within second order branches of the right lower lobe pulmonary artery. Extensive coronary calcification and borderline cardiomegaly.    Electronically signed by:  Rickey Agarwal MD  4/27/2023 6:46 PM CDT Workstation: [id number]                             X-Ray Chest 1 View (Final result)  Result time 04/27/23 17:17:02    Final result by Paco Centeno Jr., MD (04/27/23 17:17:02)                 Narrative:    EXAMINATION:    CLINICAL INDICATION:  Male, 72 years old. shortness of breath    COMPARISON:  October 19, 2021    FINDINGS:  The heart and great vessels appear normal. The trachea is displaced slightly to the right suggesting a left mediastinal mass such as thyroid goiter. That is stable as compared to previous. No evidence of hilar mass can be seen.    Both lungs are well expanded and clear.    The bilateral pleural effusion shown on the previous chest x-ray resolved. There is no evidence of pneumothorax.    IMPRESSION:  The trachea is  displaced slightly to the right suggesting a mediastinal mass such as a substernal thyroid goiter.    No other specific abnormality identified.    Electronically signed by:  Paco Centeno Jr., MD  4/27/2023 5:17 PM CDT Workstation: [id number]                                Transthoracic echo (TTE) complete  Summary  The left ventricle is normal in size with normal systolic function.   The estimated ejection fraction is 60%.   Normal left ventricular diastolic function.   Normal right ventricular size with normal right ventricular systolic function.          Pending Diagnostic Studies:     None         Medications:  Reconciled Home Medications:      Medication List      START taking these medications    * apixaban 5 mg Tab  Commonly known as: ELIQUIS  Take 2 tablets (10 mg total) by mouth 2 (two) times daily. for 7 days     * apixaban 5 mg Tab  Commonly known as: ELIQUIS  Take 1 tablet (5 mg total) by mouth 2 (two) times daily.  Start taking on: May 7, 2023         * This list has 2 medication(s) that are the same as other medications prescribed for you. Read the directions carefully, and ask your doctor or other care provider to review them with you.            CONTINUE taking these medications    acetaminophen 500 MG tablet  Commonly known as: TYLENOL  Take 2 tablets by mouth every 8 (eight) hours as needed.     aspirin 81 MG EC tablet  Commonly known as: ECOTRIN  Take 81 mg by mouth once daily.     atorvastatin 20 MG tablet  Commonly known as: LIPITOR  Take 20 mg by mouth once daily.     b complex vitamins capsule  Take 1 capsule by mouth once daily.     benzonatate 100 MG capsule  Commonly known as: TESSALON  Take 100 mg by mouth 3 (three) times daily as needed.     calcium citrate malate-vit D3 250 mg-2.5 mcg (100 unit) Tab  Commonly known as: OSCAL  Take 1 tablet by mouth.     ciclopirox 0.77 % Crea  Commonly known as: LOPROX  Apply 1 application topically daily as needed.     COCONUT OIL ORAL  Take 1 capsule  by mouth once daily.     diltiaZEM HCl 240 mg 24 hr tablet  Commonly known as: CARDIZEM LA  Take 240 mg by mouth once daily.     felodipine 5 MG 24 hr tablet  Commonly known as: PLENDIL  Take 5 mg by mouth once daily.     fexofenadine 180 MG tablet  Commonly known as: ALLEGRA  Indications: inflammation of the nose due to an allergy. Take one every morning     FIBER GUMMIES ORAL  Take 1 capsule by mouth once daily.     glucosamine-chondroitin 500-400 mg tablet  Take 1 tablet by mouth 2 (two) times a day. 1500mg/MSM 1500mg     indapamide 2.5 MG Tab  Commonly known as: LOZOL  Take 2.5 mg by mouth once daily.     K-PHOS ORIGINAL 500 mg Tbso  Generic drug: potassium phosphate (monobasic)  TAKE 1 TABLET BY MOUTH TWICE DAILY     Lactobacillus rhamnosus GG 10 billion cell capsule  Commonly known as: CULTURELLE  Take 1 capsule by mouth once daily.     levocetirizine 5 MG tablet  Commonly known as: XYZAL  Take 5 mg by mouth.     loratadine 10 mg tablet  Commonly known as: CLARITIN  Take 10 mg by mouth.     magnesium citrate 100 mg Cap  Take 1 capsule by mouth.     olmesartan 40 MG tablet  Commonly known as: BENICAR  Take 40 mg by mouth once daily.     omeprazole 40 MG capsule  Commonly known as: PRILOSEC  Take 40 mg by mouth once daily.     spironolactone 25 MG tablet  Commonly known as: ALDACTONE  Take 25 mg by mouth once daily.     tamsulosin 0.4 mg Cap  Commonly known as: FLOMAX  Take 1 capsule by mouth every evening.            Indwelling Lines/Drains at time of discharge:   Lines/Drains/Airways     None                 Time spent on the discharge of patient: 36 minutes         Douglas Francois MD  Department of Hospital Medicine  [facility]

## 2023-05-04 ENCOUNTER — HOSPITAL ENCOUNTER (OUTPATIENT)
Dept: PULMONOLOGY | Facility: HOSPITAL | Age: 73
Discharge: HOME | End: 2023-05-04
Attending: INTERNAL MEDICINE
Payer: MEDICARE

## 2023-05-04 VITALS — WEIGHT: 272 LBS | HEIGHT: 69 IN | BODY MASS INDEX: 40.29 KG/M2

## 2023-05-04 DIAGNOSIS — R06.02 SHORTNESS OF BREATH: ICD-10-CM

## 2023-05-04 PROCEDURE — 94729 DIFFUSING CAPACITY: CPT

## 2023-05-04 PROCEDURE — 94010 BREATHING CAPACITY TEST: CPT

## 2023-05-04 PROCEDURE — 94727 GAS DIL/WSHOT DETER LNG VOL: CPT

## 2023-05-04 PROCEDURE — 94618 PULMONARY STRESS TESTING: CPT

## 2023-05-04 NOTE — CARE UPDATE
"   05/04/23 0936   6MW Test   Ordering Provider Jorgito Kent MD   Performing nurse/tech/RT Tara Mcarthur, SIMA   Diagnosis Exertional Dyspnea;Qualify for Oxygen   Height 5' 9" (1.753 m)   Weight 123.4 kg (272 lb)   BMI (Calculated) 40.1   Predicted Distance Meters (Calculated) 429.4 meters   Patient Race    6MWT Status completed without stopping   Patient Reported Dyspnea   Was O2 used? No   6MW Distance walked (feet) 1400 feet   Distance walked (meters) 426.72 meters   Did patient stop? No   Type of assistive device(s) used? no assistive devices   Is extra documentation required for this patient? Yes   Pre-Exercise   Oxygen Saturation 97 %   Supplemental Oxygen Room Air   Heart Rate 83 bpm   Luis Carlos Dyspnea Rating  nothing at all   Post Exercise   Oxygen Saturation 95 %   Supplemental Oxygen Room Air   Heart Rate 108 bpm   Luis Carlos Dyspnea Rating  light   Recovery   Oxygen Saturation 97 %   Supplemental Oxygen Room Air   Heart Rate 93 bpm   Luis Carlos Dyspnea Rating  nothing at all   Interpretation   Is procedure ready for interpretation? Yes   Did the patient stop or pause? No   Total Laps Walked 7   Final Partial Lap Distance (feet) 0 feet   Total Distance Feet (Calculated) 1400 feet   Total Distance Meters (Calculated) 426.72 meters   Percentage of Predicted (Calculated) 99.38   Peak VO2 (Calculated) 16.78   Mets 4.79   Has The Patient Had a Previous Six Minute Walk Test? Yes   Comments This is a non-hypoxemic 6 minute walk. Patient does not qualify for home oxygen.   Oxygen Qualification   Oxygen Qualification? No       "

## 2023-05-11 ENCOUNTER — OFFICE VISIT (OUTPATIENT)
Dept: PULMONOLOGY | Facility: CLINIC | Age: 73
End: 2023-05-11
Payer: MEDICARE

## 2023-05-11 VITALS
HEART RATE: 78 BPM | DIASTOLIC BLOOD PRESSURE: 80 MMHG | OXYGEN SATURATION: 96 % | WEIGHT: 273 LBS | SYSTOLIC BLOOD PRESSURE: 143 MMHG | BODY MASS INDEX: 40.32 KG/M2

## 2023-05-11 DIAGNOSIS — R06.02 SHORTNESS OF BREATH: Primary | ICD-10-CM

## 2023-05-11 DIAGNOSIS — E66.01 MORBID OBESITY: ICD-10-CM

## 2023-05-11 DIAGNOSIS — G47.33 OBSTRUCTIVE SLEEP APNEA SYNDROME: ICD-10-CM

## 2023-05-11 PROBLEM — I26.99 PULMONARY EMBOLUS: Status: RESOLVED | Noted: 2023-04-27 | Resolved: 2023-05-11

## 2023-05-11 PROCEDURE — 99214 OFFICE O/P EST MOD 30 MIN: CPT | Mod: S$GLB,,, | Performed by: INTERNAL MEDICINE

## 2023-05-11 PROCEDURE — 99214 PR OFFICE/OUTPT VISIT, EST, LEVL IV, 30-39 MIN: ICD-10-PCS | Mod: S$GLB,,, | Performed by: INTERNAL MEDICINE

## 2023-05-11 NOTE — PROGRESS NOTES
[facility]  Pulmonology  Follow-Up Clinic Visit    Subjective   Reason for Referral:    Srinivasa White is a 72 y.o. male referred by Dr. Zamora for evaluation of shortness of breath.    Chief Complaint   Patient presents with    Follow-up     Follow up hypothyroidism ; Wants to talk about getting off effuron ;  taken out thyroid on May 23rd       10/4/2021:  Mr. Srinivasa White is a 71 year old gentleman with a history of CHF, HTN and prior COVID-19 who was in his usual state of health until several months ago when he developed exertional dyspnea.  3 months ago he was hospitalized with a CHF exacerbation.  His dypnea improved with IV diuretics and he was discharged home on oxygen.  He was referred her by his cardiologist;s NP.  He smoked 2 packs of cigarettes in the 1960's.  He is unaware of any intrinsic pulmonary disease.    He was in his usual state of health until 8 months ago, when he began experiencing dyspnea on exertion.  His symptoms were gradual in onset and stable since.  Symptoms occur while getting dressed.  Associated symptoms include lower extremity edema and orthopnea, but He denies any wheezing, sputum production, or pleurisy.  His symptoms are exacerbated by any exercise and improved with rest, oxygen and diuretics.   His weight has been stable, on diuretics.  His appetite has been unchanged.  The patient is having no constitutional symptoms, denying fever, chills, anorexia, or weight loss..  Work up so far has included CT chest, CXR and Echo.  Prior treatments include diuretics, which has provided some symptom releif.  He is currently prescribed lasix that he takes when he gains weight.  The patient reports adherence to this regimen.    He uses several pillows at night.   He currently is on oxygen at 2 L/min per nasal cannula..  He smoked 2 packs of cigarettes in his life.   He has (difficulty walking 50 yards on flat ground).  He has been hospitalized twice due to his  "breathing.    11/1/2021:  In pretty well since our last visit from a respiratory perspective.  He was seen by his nephrologist who switched him from furosemide to indepamide.  He has been taking this and has noticed that his edema is been better.  He had a 6 minute walk test which she completed without oxygen and did not desaturate.  Overall he is relatively free from respiratory symptoms at this time.  He did recently have a urinary tract infection the presented with some cystitis and flank pain.  He is currently taking ciprofloxacin for a total of 10 days.  His symptoms associated with his urinary tract infection improved over the weekend.    11/1/2022 - Here for follow up, Feels he is doing pretty well overall.  He has weaned himself off of O2.  Prior PFT showed mild restriction and normal DLCO.  He has not been tested for sleep apnea - he does snore but denies observed apneas.  He denies EDS.  He has some chronic nasal congestion.  He had Covid in 2020.      4/27/2023 - Here for follow up, he reports that over the last 3 weeks has noted increased exertional dyspnea and reports drops in sats to the 70's when walking.  Weight is up 13 # since last visit.  He has some cough with thick white secretions and intermittent wheezing.  No f,c,s, or CP.  His home sleep study showed mild STEPHANIE (DAMIEN - 8) and lowest sat 88%.    5/11/2023 - Here for follow up, PFT shows no obstruction, + mild restriction and mild reduction in DLCO but not significantly changed from 10/2021 other than FVC, FEV1 were improved.  Walk test was nonhypoxemic and showed good distance.  He feels the exertional dyspnea is better and wonders if he "had caught something".  No further drops in saturations noted at home.  He was in hospital overnight, CTA was poor quality study with no definite PE to my view.  Dopplers were negative and ECHO was normal with no pulmonary hypertension.    5/11/2023 - PFT (5/4/23)  - No obstruction  - Mild restriction (TLC " 69%)  - Mild decreased DLCO  - since 10/2021 FVC and FEV1 are better  - nonhypoxemic walk test     Past Medical History:   Diagnosis Date    Arthritis     BPH (benign prostatic hyperplasia)     H/O colonoscopy 2005    Polyps removed    Hypertension      Past Surgical History:   Procedure Laterality Date    APPENDECTOMY  1974    LASER OF PROSTATE W/ GREEN LIGHT PVP  10/20/15    left leg Schrap medat  1970    TONSILLECTOMY  1958    WISDOM TOOTH EXTRACTION  1964     History reviewed. No pertinent family history.   Social History     Tobacco Use    Smoking status: Former     Types: Cigarettes    Smokeless tobacco: Never   Substance and Sexual Activity    Alcohol use: Yes     Alcohol/week: 0.0 standard drinks     Comment: occasional beer or wine monthly    Drug use: No    Sexual activity: Not on file      Allergies:   Itibnosg-[id number]     Outpatient Medications as of 5/11/2023   Medication    acetaminophen (TYLENOL) 500 MG tablet    apixaban (ELIQUIS) 5 mg Tab    aspirin (ECOTRIN) 81 MG EC tablet    atorvastatin (LIPITOR) 20 MG tablet    b complex vitamins capsule    calcium citrate malate-vit D3 (OSCAL) 250 mg-2.5 mcg (100 unit) Tab    ciclopirox (LOPROX) 0.77 % Crea    COCONUT OIL ORAL    felodipine (PLENDIL) 5 MG 24 hr tablet    fluorouraciL (EFUDEX) 5 % cream    GLUCOSAMINE 500 mg Tab    glucosamine-chondroitin 500-400 mg tablet    indapamide (LOZOL) 2.5 MG Tab    inulin (FIBER GUMMIES ORAL)    Lactobacillus rhamnosus GG (CULTURELLE) 10 billion cell capsule    levocetirizine (XYZAL) 5 MG tablet    loratadine (CLARITIN) 10 mg tablet    magnesium citrate 100 mg Cap    olmesartan (BENICAR) 40 MG tablet    omeprazole (PRILOSEC) 40 MG capsule    spironolactone (ALDACTONE) 25 MG tablet    tamsulosin (FLOMAX) 0.4 mg Cap    benzonatate (TESSALON) 100 MG capsule    diltiaZEM HCl (CARDIZEM LA) 240 mg 24 hr tablet    fexofenadine (ALLEGRA) 180 MG tablet    K-PHOS ORIGINAL 500 mg TbSO     No current  facility-administered medications on file as of 5/11/2023.      Review of Systems   Constitutional:  Positive for weight loss and fatigue. Negative for fever, chills and night sweats.   HENT:  Negative for postnasal drip, rhinorrhea, sinus pressure and congestion.    Eyes:  Negative for redness and itching.   Respiratory:  Positive for cough, shortness of breath, wheezing, orthopnea and dyspnea on extertion.    Cardiovascular:  Positive for leg swelling. Negative for chest pain and palpitations.   Genitourinary:  Negative for difficulty urinating and hematuria.   Endocrine:  Negative for polydipsia, polyphagia and polyuria.    Musculoskeletal:  Negative for gait problem, joint swelling and myalgias.   Skin:  Negative for rash.   Gastrointestinal:  Negative for nausea, vomiting, abdominal pain and acid reflux.   Neurological:  Negative for syncope, weakness and headaches.   Hematological:  Negative for adenopathy. Does not bruise/bleed easily and no excessive bruising.   Psychiatric/Behavioral:  Negative for confusion and sleep disturbance. The patient is not nervous/anxious.    All other systems reviewed and are negative.   Previous Reports Reviewed:   ER records, historical medical records, lab reports, nursing home notes, office notes, operative reports, radiology reports, referral letter/letters and x-ray reports   The following portions of the patient's history were reviewed and updated as appropriate: allergies, current medications, past family history, past medical history, past social history, past surgical history and problem list.    Objective:      BP (!) 143/80 (BP Location: Left arm, Patient Position: Sitting, BP Method: Medium (Manual))   Pulse 78   Wt 123.8 kg (273 lb)   SpO2 96%   BMI 40.32 kg/m²   Body mass index is 40.32 kg/m².     Physical Exam   Constitutional: He is oriented to person, place, and time. He appears well-developed and well-nourished. No distress.   HENT:   Head: Normocephalic.    Mouth/Throat: Oropharynx is clear and moist. Mallampati Score: III.   Neck: No thyromegaly present.   Cardiovascular: Normal rate, regular rhythm, normal heart sounds and intact distal pulses.   Pulmonary/Chest: Normal expansion, symmetric chest wall expansion and effort normal. He has no wheezes. He has no rhonchi. He has no rales.   Abdominal: Soft. Bowel sounds are normal. He exhibits no distension. There is no abdominal tenderness.   Musculoskeletal:         General: No tenderness, deformity or edema. Normal range of motion.      Cervical back: Normal range of motion and neck supple.   Lymphadenopathy: No supraclavicular adenopathy is present.     He has no cervical adenopathy.     He has no axillary adenopathy.   Neurological: He is alert and oriented to person, place, and time. No cranial nerve deficit.   Skin: Skin is warm and dry. No rash noted.   Psychiatric: He has a normal mood and affect.   Nursing note and vitals reviewed.   Personal Review of Relevant Diagnostic Studies:  I have personally reviewed and interpreted the following labs/studies/images.  6MWT:  10/21/2021:  Distance:  Predicted:  443.2 m  Actual:  426.72 m  SpO2:  Rest:  96% on RA  Exercise:  97% on RA.      PFTs:  10/21/2021:  FVC:  2.37 959%)  FEV1:  1.92 (64%)  FEV1/FVC:  81  T.91 (71%)  DLCO:  16.48 (67%)  My impression.  No obstruction.  Mild restriction.  Mild diffusion impairment.  2023  FVC - 82%  FEV1 - 89%  FEV1/FVC - 82%  TLC - 69%  DLCO - 65%  Moderate restriction, mild decrease DLCO  Nonhypoxemic walk test    CXR:  10/19/2021:  Blunting of the costophrenic angles, right greater than left are consistent with small pleural effusions    BMP:  2021:  140 / 3.7 / 104 / 28 / 13 / 0.74 / 134  D-dimer:  <200 (normal)    NT-proBNP:  2021:  522    CTA chest:  2021:  My impression:  Small filling defect in a subsegmental artery that looks more like a flow void.  I am unconvinced that it represent a clot.  1.   Small focal peripheral filling defect in a subsegmental artery supplying the lateral segment of the right middle lobe likely reflects a chronic pulmonary embolism.  2.  The thoracic aorta is normal caliber with mild atherosclerosis    Assessment:       1. Shortness of breath    2. Morbid obesity    3. Obstructive sleep apnea syndrome              Plan:     Better  PFT as above  We discussed the results and have decided to stop eliquis - low clinical suspicion, poor quality study, negative dopplers, normal ECHO  I suspect his episode of increased dyspnea was some acute events  RTC 1 month          Orders Placed This Visit:  No orders of the defined types were placed in this encounter.          Jorgito Kent MD  [facility] Pulmonary/Critical Care  05/11/2023

## 2023-05-17 ENCOUNTER — HOSPITAL ENCOUNTER (OUTPATIENT)
Dept: RADIOLOGY | Facility: HOSPITAL | Age: 73
Discharge: HOME | End: 2023-05-17
Attending: SURGERY
Payer: MEDICARE

## 2023-05-17 ENCOUNTER — HOSPITAL ENCOUNTER (OUTPATIENT)
Dept: PREADMISSION TESTING | Facility: HOSPITAL | Age: 73
Discharge: HOME | End: 2023-05-17
Attending: SURGERY
Payer: MEDICARE

## 2023-05-17 VITALS
SYSTOLIC BLOOD PRESSURE: 126 MMHG | HEIGHT: 69 IN | DIASTOLIC BLOOD PRESSURE: 69 MMHG | BODY MASS INDEX: 40.42 KG/M2 | OXYGEN SATURATION: 98 % | TEMPERATURE: 98 F | WEIGHT: 272.94 LBS | RESPIRATION RATE: 20 BRPM | HEART RATE: 74 BPM

## 2023-05-17 DIAGNOSIS — Z01.818 PRE-OP TESTING: Primary | ICD-10-CM

## 2023-05-17 DIAGNOSIS — Z01.818 PREOP EXAMINATION: Primary | ICD-10-CM

## 2023-05-17 DIAGNOSIS — Z01.818 PREOP EXAMINATION: ICD-10-CM

## 2023-05-17 PROCEDURE — 71046 X-RAY EXAM CHEST 2 VIEWS: CPT | Mod: TC

## 2023-05-17 RX ORDER — CEFAZOLIN SODIUM 2 G/50ML
2 SOLUTION INTRAVENOUS ONCE
Status: CANCELLED | OUTPATIENT
Start: 2023-05-23

## 2023-05-19 ENCOUNTER — OFFICE VISIT (OUTPATIENT)
Dept: URGENT CARE | Facility: CLINIC | Age: 73
End: 2023-05-19
Payer: MEDICARE

## 2023-05-19 VITALS
WEIGHT: 272 LBS | HEART RATE: 79 BPM | RESPIRATION RATE: 16 BRPM | SYSTOLIC BLOOD PRESSURE: 121 MMHG | DIASTOLIC BLOOD PRESSURE: 71 MMHG | OXYGEN SATURATION: 95 % | BODY MASS INDEX: 40.17 KG/M2 | TEMPERATURE: 97 F

## 2023-05-19 DIAGNOSIS — J02.9 SORE THROAT: ICD-10-CM

## 2023-05-19 DIAGNOSIS — R09.81 NASAL CONGESTION: ICD-10-CM

## 2023-05-19 DIAGNOSIS — J40 BRONCHITIS: Primary | ICD-10-CM

## 2023-05-19 PROCEDURE — 99213 PR OFFICE/OUTPT VISIT, EST, LEVL III, 20-29 MIN: ICD-10-PCS | Mod: S$GLB,,, | Performed by: NURSE PRACTITIONER

## 2023-05-19 PROCEDURE — 99213 OFFICE O/P EST LOW 20 MIN: CPT | Mod: S$GLB,,, | Performed by: NURSE PRACTITIONER

## 2023-05-19 RX ORDER — DEXBROMPHENIRAMINE MALEATE, PHENYLEPHRINE HYDROCHLORIDE 2; 7.5 MG/1; MG/1
1 TABLET ORAL EVERY 4 HOURS PRN
Qty: 28 TABLET | Refills: 0 | Status: SHIPPED | OUTPATIENT
Start: 2023-05-19 | End: 2023-05-26

## 2023-05-19 RX ORDER — AZITHROMYCIN 250 MG/1
TABLET, FILM COATED ORAL
Status: ON HOLD | COMMUNITY
Start: 2023-05-16 | End: 2023-05-23

## 2023-05-19 RX ORDER — CEFDINIR 300 MG/1
300 CAPSULE ORAL EVERY 12 HOURS
Qty: 20 CAPSULE | Refills: 0 | Status: SHIPPED | OUTPATIENT
Start: 2023-05-19 | End: 2023-05-29

## 2023-05-19 NOTE — PROGRESS NOTES
Subjective:      Patient ID: Srinivasa White is a 72 y.o. male.    Vitals:  weight is 123.4 kg (272 lb). His temperature is 97.2 °F (36.2 °C). His blood pressure is 121/71 and his pulse is 79. His respiration is 16 and oxygen saturation is 95%.     Chief Complaint: Sinus Problem    Sinus Problem  This is a new problem. The current episode started more than 1 month ago. The problem has been gradually improving since onset. There has been no fever. Associated symptoms include congestion, coughing, ear pain, headaches, sinus pressure, sneezing and a sore throat.     Constitution: Negative.   HENT:  Positive for ear pain, congestion, sinus pressure and sore throat.    Neck: neck negative.   Cardiovascular: Negative.    Eyes: Negative.    Respiratory:  Positive for cough.    Gastrointestinal: Negative.    Endocrine: negative.   Genitourinary: Negative.    Musculoskeletal: Negative.    Skin: Negative.    Allergic/Immunologic: Positive for sneezing.   Neurological:  Positive for headaches.    Objective:     Physical Exam   Constitutional: He is oriented to person, place, and time. He appears well-developed. He is cooperative.  Non-toxic appearance. He does not appear ill. No distress.   HENT:   Head: Normocephalic and atraumatic.   Ears:   Right Ear: Hearing, external ear and ear canal normal. A middle ear effusion is present.   Left Ear: Hearing, external ear and ear canal normal. A middle ear effusion is present.   Nose: Nose normal. No mucosal edema, rhinorrhea or nasal deformity. No epistaxis. Right sinus exhibits no maxillary sinus tenderness and no frontal sinus tenderness. Left sinus exhibits no maxillary sinus tenderness and no frontal sinus tenderness.   Mouth/Throat: Uvula is midline and mucous membranes are normal. No trismus in the jaw. Normal dentition. No uvula swelling. Posterior oropharyngeal erythema present. No oropharyngeal exudate or posterior oropharyngeal edema.   Eyes: Conjunctivae and lids are  normal. No scleral icterus.   Neck: Trachea normal and phonation normal. Neck supple. No edema present. No erythema present. No neck rigidity present.   Cardiovascular: Normal rate, regular rhythm, normal heart sounds and normal pulses.   Pulmonary/Chest: Effort normal and breath sounds normal. No respiratory distress. He has no decreased breath sounds. He has no rhonchi.   Abdominal: Normal appearance.   Musculoskeletal: Normal range of motion.         General: No deformity. Normal range of motion.   Neurological: He is alert and oriented to person, place, and time. He exhibits normal muscle tone. Coordination normal.   Skin: Skin is warm, dry, intact, not diaphoretic and not pale.   Psychiatric: His speech is normal and behavior is normal. Judgment and thought content normal.   Nursing note and vitals reviewed.    Assessment:     1. Bronchitis    2. Sore throat    3. Nasal congestion        Plan:       Bronchitis    Sore throat    Nasal congestion    Other orders  -     cefdinir (OMNICEF) 300 MG capsule; Take 1 capsule (300 mg total) by mouth every 12 (twelve) hours. for 10 days  Dispense: 20 capsule; Refill: 0  -     dexbrompheniramine-phenyleph (ALAHIST PE) 2-7.5 mg Tab; Take 1 tablet by mouth every 4 (four) hours as needed.  Dispense: 28 tablet; Refill: 0      Stop Azithromycin

## 2023-05-23 ENCOUNTER — ANESTHESIA (OUTPATIENT)
Dept: SURGERY | Facility: HOSPITAL | Age: 73
DRG: 627 | End: 2023-05-23
Payer: MEDICARE

## 2023-05-23 ENCOUNTER — ANESTHESIA EVENT (OUTPATIENT)
Dept: SURGERY | Facility: HOSPITAL | Age: 73
DRG: 627 | End: 2023-05-23
Payer: MEDICARE

## 2023-05-23 ENCOUNTER — HOSPITAL ENCOUNTER (INPATIENT)
Facility: HOSPITAL | Age: 73
LOS: 1 days | Discharge: HOME | DRG: 627 | End: 2023-05-24
Attending: SURGERY | Admitting: SURGERY
Payer: MEDICARE

## 2023-05-23 DIAGNOSIS — E04.9 NON-TOXIC GOITER: Primary | ICD-10-CM

## 2023-05-23 DIAGNOSIS — E89.0 S/P COMPLETE THYROIDECTOMY: ICD-10-CM

## 2023-05-23 DIAGNOSIS — Z01.818 PRE-OP TESTING: ICD-10-CM

## 2023-05-23 DIAGNOSIS — Z41.9 SURGERY, ELECTIVE: ICD-10-CM

## 2023-05-23 LAB
CALCIUM SERPL-MCNC: 9 MG/DL (ref 8.7–10.5)
CREAT SERPL-MCNC: 1.2 MG/DL (ref 0.5–1.4)
EST. GFR  (NO RACE VARIABLE): >60 ML/MIN/1.73 M^2

## 2023-05-23 PROCEDURE — 27201423 OPTIME MED/SURG SUP & DEVICES STERILE SUPPLY: Performed by: SURGERY

## 2023-05-23 PROCEDURE — 63600175 PHARM REV CODE 636 W HCPCS: Performed by: NURSE ANESTHETIST, CERTIFIED REGISTERED

## 2023-05-23 PROCEDURE — D9220A PRA ANESTHESIA: ICD-10-PCS | Mod: CRNA,,, | Performed by: NURSE ANESTHETIST, CERTIFIED REGISTERED

## 2023-05-23 PROCEDURE — 94799 UNLISTED PULMONARY SVC/PX: CPT

## 2023-05-23 PROCEDURE — 37000008 HC ANESTHESIA 1ST 15 MINUTES: Performed by: SURGERY

## 2023-05-23 PROCEDURE — 63600175 PHARM REV CODE 636 W HCPCS: Performed by: SURGERY

## 2023-05-23 PROCEDURE — 36000707: Performed by: SURGERY

## 2023-05-23 PROCEDURE — 99900035 HC TECH TIME PER 15 MIN (STAT)

## 2023-05-23 PROCEDURE — 71000033 HC RECOVERY, INTIAL HOUR: Performed by: SURGERY

## 2023-05-23 PROCEDURE — 71000039 HC RECOVERY, EACH ADD'L HOUR: Performed by: SURGERY

## 2023-05-23 PROCEDURE — 82310 ASSAY OF CALCIUM: CPT | Performed by: SURGERY

## 2023-05-23 PROCEDURE — 99900031 HC PATIENT EDUCATION (STAT)

## 2023-05-23 PROCEDURE — 36415 COLL VENOUS BLD VENIPUNCTURE: CPT | Performed by: SURGERY

## 2023-05-23 PROCEDURE — 94761 N-INVAS EAR/PLS OXIMETRY MLT: CPT

## 2023-05-23 PROCEDURE — 25000003 PHARM REV CODE 250: Performed by: NURSE ANESTHETIST, CERTIFIED REGISTERED

## 2023-05-23 PROCEDURE — D9220A PRA ANESTHESIA: ICD-10-PCS | Mod: ANES,,, | Performed by: ANESTHESIOLOGY

## 2023-05-23 PROCEDURE — S5010 5% DEXTROSE AND 0.45% SALINE: HCPCS | Performed by: SURGERY

## 2023-05-23 PROCEDURE — 37000009 HC ANESTHESIA EA ADD 15 MINS: Performed by: SURGERY

## 2023-05-23 PROCEDURE — 88307 TISSUE EXAM BY PATHOLOGIST: CPT | Mod: TC,59

## 2023-05-23 PROCEDURE — 36000706: Performed by: SURGERY

## 2023-05-23 PROCEDURE — D9220A PRA ANESTHESIA: Mod: ANES,,, | Performed by: ANESTHESIOLOGY

## 2023-05-23 PROCEDURE — 82565 ASSAY OF CREATININE: CPT | Performed by: SURGERY

## 2023-05-23 PROCEDURE — 25000003 PHARM REV CODE 250: Performed by: SURGERY

## 2023-05-23 PROCEDURE — D9220A PRA ANESTHESIA: Mod: CRNA,,, | Performed by: NURSE ANESTHETIST, CERTIFIED REGISTERED

## 2023-05-23 PROCEDURE — 12000002 HC ACUTE/MED SURGE SEMI-PRIVATE ROOM

## 2023-05-23 RX ORDER — SUCCINYLCHOLINE CHLORIDE 20 MG/ML
INJECTION INTRAMUSCULAR; INTRAVENOUS
Status: DISCONTINUED | OUTPATIENT
Start: 2023-05-23 | End: 2023-05-23

## 2023-05-23 RX ORDER — ACETAMINOPHEN 325 MG/1
650 TABLET ORAL EVERY 4 HOURS PRN
Status: DISCONTINUED | OUTPATIENT
Start: 2023-05-23 | End: 2023-05-24 | Stop reason: HOSPADM

## 2023-05-23 RX ORDER — LIDOCAINE HYDROCHLORIDE 40 MG/ML
SOLUTION TOPICAL
Status: DISCONTINUED | OUTPATIENT
Start: 2023-05-23 | End: 2023-05-23

## 2023-05-23 RX ORDER — OXYCODONE HYDROCHLORIDE 5 MG/1
5 TABLET ORAL
Status: DISCONTINUED | OUTPATIENT
Start: 2023-05-23 | End: 2023-05-23 | Stop reason: HOSPADM

## 2023-05-23 RX ORDER — INDAPAMIDE 2.5 MG/1
2.5 TABLET ORAL DAILY
Status: DISCONTINUED | OUTPATIENT
Start: 2023-05-23 | End: 2023-05-24 | Stop reason: HOSPADM

## 2023-05-23 RX ORDER — LIDOCAINE HYDROCHLORIDE 20 MG/ML
INJECTION INTRAVENOUS
Status: DISCONTINUED | OUTPATIENT
Start: 2023-05-23 | End: 2023-05-23

## 2023-05-23 RX ORDER — DIPHENHYDRAMINE HYDROCHLORIDE 50 MG/ML
12.5 INJECTION INTRAMUSCULAR; INTRAVENOUS
Status: DISCONTINUED | OUTPATIENT
Start: 2023-05-23 | End: 2023-05-23 | Stop reason: HOSPADM

## 2023-05-23 RX ORDER — DEXAMETHASONE SODIUM PHOSPHATE 4 MG/ML
INJECTION, SOLUTION INTRA-ARTICULAR; INTRALESIONAL; INTRAMUSCULAR; INTRAVENOUS; SOFT TISSUE
Status: DISCONTINUED | OUTPATIENT
Start: 2023-05-23 | End: 2023-05-23

## 2023-05-23 RX ORDER — PANTOPRAZOLE SODIUM 40 MG/1
40 TABLET, DELAYED RELEASE ORAL DAILY
Status: DISCONTINUED | OUTPATIENT
Start: 2023-05-24 | End: 2023-05-24 | Stop reason: HOSPADM

## 2023-05-23 RX ORDER — ASPIRIN 81 MG/1
81 TABLET ORAL DAILY
Status: DISCONTINUED | OUTPATIENT
Start: 2023-05-23 | End: 2023-05-24 | Stop reason: HOSPADM

## 2023-05-23 RX ORDER — ATORVASTATIN CALCIUM 20 MG/1
20 TABLET, FILM COATED ORAL DAILY
Status: DISCONTINUED | OUTPATIENT
Start: 2023-05-23 | End: 2023-05-24 | Stop reason: HOSPADM

## 2023-05-23 RX ORDER — ONDANSETRON 2 MG/ML
4 INJECTION INTRAMUSCULAR; INTRAVENOUS EVERY 12 HOURS PRN
Status: DISCONTINUED | OUTPATIENT
Start: 2023-05-23 | End: 2023-05-24 | Stop reason: HOSPADM

## 2023-05-23 RX ORDER — ACETAMINOPHEN 500 MG
1000 TABLET ORAL EVERY 8 HOURS PRN
Status: DISCONTINUED | OUTPATIENT
Start: 2023-05-23 | End: 2023-05-23

## 2023-05-23 RX ORDER — NALOXONE HCL 0.4 MG/ML
0.04 VIAL (ML) INJECTION ONCE
Status: DISCONTINUED | OUTPATIENT
Start: 2023-05-23 | End: 2023-05-24 | Stop reason: HOSPADM

## 2023-05-23 RX ORDER — CODEINE PHOSPHATE AND GUAIFENESIN 10; 100 MG/5ML; MG/5ML
5 SOLUTION ORAL EVERY 6 HOURS PRN
Status: DISCONTINUED | OUTPATIENT
Start: 2023-05-23 | End: 2023-05-24 | Stop reason: HOSPADM

## 2023-05-23 RX ORDER — KETAMINE HYDROCHLORIDE 10 MG/ML
INJECTION, SOLUTION INTRAMUSCULAR; INTRAVENOUS
Status: DISCONTINUED | OUTPATIENT
Start: 2023-05-23 | End: 2023-05-23

## 2023-05-23 RX ORDER — HYDROCODONE BITARTRATE AND ACETAMINOPHEN 5; 325 MG/1; MG/1
1 TABLET ORAL EVERY 4 HOURS PRN
Status: DISCONTINUED | OUTPATIENT
Start: 2023-05-23 | End: 2023-05-24 | Stop reason: HOSPADM

## 2023-05-23 RX ORDER — DOXYLAMINE SUCCINATE 25 MG
TABLET ORAL 2 TIMES DAILY
Status: DISCONTINUED | OUTPATIENT
Start: 2023-05-23 | End: 2023-05-24 | Stop reason: HOSPADM

## 2023-05-23 RX ORDER — ONDANSETRON 2 MG/ML
INJECTION INTRAMUSCULAR; INTRAVENOUS
Status: DISCONTINUED | OUTPATIENT
Start: 2023-05-23 | End: 2023-05-23

## 2023-05-23 RX ORDER — ROCURONIUM BROMIDE 10 MG/ML
INJECTION, SOLUTION INTRAVENOUS
Status: DISCONTINUED | OUTPATIENT
Start: 2023-05-23 | End: 2023-05-23

## 2023-05-23 RX ORDER — SPIRONOLACTONE 25 MG/1
25 TABLET ORAL DAILY
Status: DISCONTINUED | OUTPATIENT
Start: 2023-05-23 | End: 2023-05-24 | Stop reason: HOSPADM

## 2023-05-23 RX ORDER — TAMSULOSIN HYDROCHLORIDE 0.4 MG/1
1 CAPSULE ORAL NIGHTLY
Status: DISCONTINUED | OUTPATIENT
Start: 2023-05-23 | End: 2023-05-24 | Stop reason: HOSPADM

## 2023-05-23 RX ORDER — ONDANSETRON 2 MG/ML
4 INJECTION INTRAMUSCULAR; INTRAVENOUS DAILY PRN
Status: DISCONTINUED | OUTPATIENT
Start: 2023-05-23 | End: 2023-05-23 | Stop reason: HOSPADM

## 2023-05-23 RX ORDER — FENTANYL CITRATE 50 UG/ML
INJECTION, SOLUTION INTRAMUSCULAR; INTRAVENOUS
Status: DISCONTINUED | OUTPATIENT
Start: 2023-05-23 | End: 2023-05-23

## 2023-05-23 RX ORDER — SODIUM CHLORIDE, SODIUM LACTATE, POTASSIUM CHLORIDE, CALCIUM CHLORIDE 600; 310; 30; 20 MG/100ML; MG/100ML; MG/100ML; MG/100ML
INJECTION, SOLUTION INTRAVENOUS CONTINUOUS PRN
Status: DISCONTINUED | OUTPATIENT
Start: 2023-05-23 | End: 2023-05-23

## 2023-05-23 RX ORDER — FENTANYL CITRATE 50 UG/ML
25 INJECTION, SOLUTION INTRAMUSCULAR; INTRAVENOUS EVERY 5 MIN PRN
Status: DISCONTINUED | OUTPATIENT
Start: 2023-05-23 | End: 2023-05-23 | Stop reason: HOSPADM

## 2023-05-23 RX ORDER — FERROUS SULFATE, DRIED 160(50) MG
1 TABLET, EXTENDED RELEASE ORAL 2 TIMES DAILY
Status: DISCONTINUED | OUTPATIENT
Start: 2023-05-23 | End: 2023-05-24 | Stop reason: HOSPADM

## 2023-05-23 RX ORDER — CEFDINIR 300 MG/1
300 CAPSULE ORAL EVERY 12 HOURS
Status: DISCONTINUED | OUTPATIENT
Start: 2023-05-23 | End: 2023-05-24 | Stop reason: HOSPADM

## 2023-05-23 RX ORDER — FAMOTIDINE 10 MG/ML
INJECTION INTRAVENOUS
Status: DISCONTINUED | OUTPATIENT
Start: 2023-05-23 | End: 2023-05-23

## 2023-05-23 RX ORDER — HYDROCODONE BITARTRATE AND ACETAMINOPHEN 10; 325 MG/1; MG/1
1 TABLET ORAL EVERY 4 HOURS PRN
Status: DISCONTINUED | OUTPATIENT
Start: 2023-05-23 | End: 2023-05-24 | Stop reason: HOSPADM

## 2023-05-23 RX ORDER — PROPOFOL 10 MG/ML
VIAL (ML) INTRAVENOUS
Status: DISCONTINUED | OUTPATIENT
Start: 2023-05-23 | End: 2023-05-23

## 2023-05-23 RX ORDER — DEXTROSE MONOHYDRATE AND SODIUM CHLORIDE 5; .45 G/100ML; G/100ML
INJECTION, SOLUTION INTRAVENOUS CONTINUOUS
Status: DISCONTINUED | OUTPATIENT
Start: 2023-05-23 | End: 2023-05-23

## 2023-05-23 RX ADMIN — INDAPAMIDE 2.5 MG: 2.5 TABLET, FILM COATED ORAL at 10:05

## 2023-05-23 RX ADMIN — ROCURONIUM BROMIDE 10 MG: 10 INJECTION, SOLUTION INTRAVENOUS at 08:05

## 2023-05-23 RX ADMIN — ONDANSETRON 4 MG: 2 INJECTION INTRAMUSCULAR; INTRAVENOUS at 08:05

## 2023-05-23 RX ADMIN — ROCURONIUM BROMIDE 30 MG: 10 INJECTION, SOLUTION INTRAVENOUS at 08:05

## 2023-05-23 RX ADMIN — Medication 160 MG: at 08:05

## 2023-05-23 RX ADMIN — KETAMINE HYDROCHLORIDE 25 MG: 10 INJECTION, SOLUTION INTRAMUSCULAR; INTRAVENOUS at 12:05

## 2023-05-23 RX ADMIN — FAMOTIDINE 20 MG: 10 INJECTION, SOLUTION INTRAVENOUS at 08:05

## 2023-05-23 RX ADMIN — KETAMINE HYDROCHLORIDE 25 MG: 10 INJECTION, SOLUTION INTRAMUSCULAR; INTRAVENOUS at 09:05

## 2023-05-23 RX ADMIN — ROCURONIUM BROMIDE 10 MG: 10 INJECTION, SOLUTION INTRAVENOUS at 11:05

## 2023-05-23 RX ADMIN — DEXAMETHASONE SODIUM PHOSPHATE 8 MG: 4 INJECTION, SOLUTION INTRAMUSCULAR; INTRAVENOUS at 08:05

## 2023-05-23 RX ADMIN — KETAMINE HYDROCHLORIDE 25 MG: 10 INJECTION, SOLUTION INTRAMUSCULAR; INTRAVENOUS at 01:05

## 2023-05-23 RX ADMIN — SODIUM CHLORIDE, SODIUM LACTATE, POTASSIUM CHLORIDE, AND CALCIUM CHLORIDE: .6; .31; .03; .02 INJECTION, SOLUTION INTRAVENOUS at 08:05

## 2023-05-23 RX ADMIN — KETAMINE HYDROCHLORIDE 25 MG: 10 INJECTION, SOLUTION INTRAMUSCULAR; INTRAVENOUS at 11:05

## 2023-05-23 RX ADMIN — ATORVASTATIN CALCIUM 20 MG: 20 TABLET, FILM COATED ORAL at 06:05

## 2023-05-23 RX ADMIN — GLYCOPYRROLATE 0.2 MG: 0.2 INJECTION, SOLUTION INTRAMUSCULAR; INTRAVITREAL at 02:05

## 2023-05-23 RX ADMIN — SPIRONOLACTONE 25 MG: 25 TABLET ORAL at 10:05

## 2023-05-23 RX ADMIN — LIDOCAINE HYDROCHLORIDE 100 MG: 20 INJECTION, SOLUTION INTRAVENOUS at 08:05

## 2023-05-23 RX ADMIN — KETAMINE HYDROCHLORIDE 25 MG: 10 INJECTION, SOLUTION INTRAMUSCULAR; INTRAVENOUS at 10:05

## 2023-05-23 RX ADMIN — PROPOFOL 200 MG: 10 INJECTION, EMULSION INTRAVENOUS at 08:05

## 2023-05-23 RX ADMIN — KETAMINE HYDROCHLORIDE 25 MG: 10 INJECTION, SOLUTION INTRAMUSCULAR; INTRAVENOUS at 08:05

## 2023-05-23 RX ADMIN — ASPIRIN 81 MG: 81 TABLET, COATED ORAL at 06:05

## 2023-05-23 RX ADMIN — SODIUM CHLORIDE, SODIUM LACTATE, POTASSIUM CHLORIDE, AND CALCIUM CHLORIDE: .6; .31; .03; .02 INJECTION, SOLUTION INTRAVENOUS at 09:05

## 2023-05-23 RX ADMIN — CEFAZOLIN SODIUM 3 G: 2 SOLUTION INTRAVENOUS at 08:05

## 2023-05-23 RX ADMIN — TAMSULOSIN HYDROCHLORIDE 0.4 MG: 0.4 CAPSULE ORAL at 09:05

## 2023-05-23 RX ADMIN — CEFDINIR 300 MG: 300 CAPSULE ORAL at 09:05

## 2023-05-23 RX ADMIN — FENTANYL CITRATE 50 MCG: 50 INJECTION, SOLUTION INTRAMUSCULAR; INTRAVENOUS at 08:05

## 2023-05-23 RX ADMIN — GUAIFENESIN AND CODEINE PHOSPHATE 5 ML: 100; 10 SOLUTION ORAL at 07:05

## 2023-05-23 RX ADMIN — CALCIUM CARBONATE-VITAMIN D TAB 500 MG-200 UNIT 1 TABLET: 500-200 TAB at 09:05

## 2023-05-23 RX ADMIN — SODIUM CHLORIDE, SODIUM LACTATE, POTASSIUM CHLORIDE, AND CALCIUM CHLORIDE: .6; .31; .03; .02 INJECTION, SOLUTION INTRAVENOUS at 02:05

## 2023-05-23 RX ADMIN — SUGAMMADEX 200 MG: 100 INJECTION, SOLUTION INTRAVENOUS at 02:05

## 2023-05-23 RX ADMIN — SODIUM CHLORIDE, SODIUM LACTATE, POTASSIUM CHLORIDE, AND CALCIUM CHLORIDE: .6; .31; .03; .02 INJECTION, SOLUTION INTRAVENOUS at 12:05

## 2023-05-23 RX ADMIN — LIDOCAINE HYDROCHLORIDE 40 MG: 40 SOLUTION TOPICAL at 08:05

## 2023-05-23 RX ADMIN — DEXTROSE AND SODIUM CHLORIDE: 5; .45 INJECTION, SOLUTION INTRAVENOUS at 06:05

## 2023-05-23 NOTE — ANESTHESIA PREPROCEDURE EVALUATION
05/23/2023  Srinivasa White is a 72 y.o., male.      Pre-op Assessment    I have reviewed the Patient Summary Reports.     I have reviewed the Nursing Notes. I have reviewed the NPO Status.   I have reviewed the Medications.     Review of Systems  Anesthesia Hx:  Denies Family Hx of Anesthesia complications.   Denies Personal Hx of Anesthesia complications.   Social:  Former Smoker    Hematology/Oncology:  Hematology Normal       -- Cancer in past history:    EENT/Dental:  EENT/Dental Normal Broken molar   Cardiovascular:   Hypertension CHF (In 2021 after COVID vaccination.  Fully resolved.  Recent echo shows 60% EF and normal LV diastolic function ) Hyperlipidemia:  skin. Patient recently saw Dr. Zamora and heart is doing very well.   Pulmonary:   Asthma (As a child only) Recent URI ( productive cough for the past month but finally resolved 4 days ago after multiple courses of antibiotics), resolved Sleep Apnea ( patient says that the sleep study result was borderline and therefore he does not use CPAP.)    Education provided regarding risk of obstructive sleep apnea     Renal/:   renal calculi BPH History of nephrogenic diabetes insipidus   Hepatic/GI:   Denies GERD.    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  No dysphagia, hoarseness, or dyspnea. Morbid Obesity / BMI > 40  Psych:  Psychiatric Normal           Physical Exam  General: Cooperative, Alert and Oriented    Airway:  Mallampati: III / II  Mouth Opening: Normal  TM Distance: > 6 cm  Tongue: Normal  Neck ROM: Normal ROM    Dental:  Intact    Chest/Lungs:  Clear to auscultation, Normal Respiratory Rate    Heart:  Rate: Normal  Rhythm: Regular Rhythm  Sounds: Normal        Anesthesia Plan  Type of Anesthesia, risks  & benefits discussed:    Anesthesia Type: Gen ETT  Intra-op Monitoring Plan: Standard ASA Monitors  Post Op Pain Control Plan: multimodal analgesia  Induction:  IV  Airway Plan: Direct, Post-Induction  Informed Consent: Informed consent signed with the Patient and all parties understand the risks and agree with anesthesia plan.  All questions answered.   ASA Score: 3  Anesthesia Plan Notes: GETA, succinylcholine with defasciculating rocuronium  Sleep apnea precautions: No Versed, minimize opioids, extubate awake and sitting up, sugammadex  Multimodal analgesia:  IV acetaminophen, Decadron 8 mg, ketamine 25 mg every hour  Antiemetics: Zofran, Pepcid  Titrate Robinul before end of case to decrease secretions.          Ready For Surgery From Anesthesia Perspective.     .

## 2023-05-23 NOTE — CARE UPDATE
05/23/23 1737   Patient Assessment/Suction   Level of Consciousness (AVPU) alert   Respiratory Effort Normal;Unlabored   Expansion/Accessory Muscles/Retractions expansion symmetric;no retractions   Rhythm/Pattern, Respiratory depth regular;pattern regular   PRE-TX-O2   Device (Oxygen Therapy) nasal cannula   Flow (L/min) 2   SpO2 98 %   Pulse Oximetry Type Continuous   $ Pulse Oximetry - Multiple Charge Pulse Oximetry - Multiple   ETCO2   $ ETCO2 Usage Currently wearing;At bedside   ETCO2 (mmHg) 34 mmHg   ETCO2 Device Type Bedside Monitor;Nasal Cannula   ETCO2 High Alarm 50   ETCO2 Low Alarm 10   Incentive Spirometer   $ Incentive Spirometer Charges done with encouragement;postop instruction   Administration (IS) instruction provided, initial   Number of Repetitions (IS) 8   Level Incentive Spirometer (mL) 1500   Patient Tolerance (IS) good   Education   $ Education 15 min  (IS, ETCO2 monitoring)   Respiratory Evaluation   $ Care Plan Tech Time 15 min   $ Eval/Re-eval Charges Evaluation

## 2023-05-23 NOTE — TRANSFER OF CARE
"Anesthesia Transfer of Care Note    Patient: Srinivasa White    Procedure(s) Performed: Procedure(s) (LRB):  THYROIDECTOMY (N/A)    Patient location: PACU    Anesthesia Type: general    Transport from OR: Transported from OR on room air with adequate spontaneous ventilation    Post pain: adequate analgesia    Post assessment: no apparent anesthetic complications    Post vital signs: stable    Level of consciousness: awake and alert    Nausea/Vomiting: no nausea/vomiting    Complications: none    Transfer of care protocol was followed      Last vitals:   Visit Vitals  BP (!) 154/73   Pulse 83   Temp 36.6 °C (97.8 °F) (Oral)   Resp 18   Ht 5' 9" (1.753 m)   Wt 123.8 kg (272 lb 14.9 oz)   SpO2 97%   BMI 40.30 kg/m²     "

## 2023-05-23 NOTE — NURSING
Nurses Note -- 4 Eyes      5/23/2023   5:47 PM      Skin assessed during: Admit      [x] No Altered Skin Integrity Present    []Prevention Measures Documented      [] Yes- Altered Skin Integrity Present or Discovered   [] LDA Added if Not in Epic (Describe Wound)   [] New Altered Skin Integrity was Present on Admit and Documented in LDA   [] Wound Image Taken    Wound Care Consulted? No    Attending Nurse:  Lester Ware RN     Second RN/Staff Member:  [id number]

## 2023-05-23 NOTE — ANESTHESIA POSTPROCEDURE EVALUATION
Anesthesia Post Evaluation    Patient: Srinivasa White    Procedure(s) Performed: Procedure(s) (LRB):  THYROIDECTOMY (N/A)    Final Anesthesia Type: general      Patient location during evaluation: PACU  Patient participation: Yes- Able to Participate  Level of consciousness: awake and alert  Post-procedure vital signs: reviewed and stable  Pain management: adequate  Airway patency: patent  STEPHANIE mitigation strategies: Extubation while patient is awake, Multimodal analgesia and Extubation and recovery carried out in lateral, semiupright, or other nonsupine position  PONV status at discharge: No PONV  Anesthetic complications: no      Cardiovascular status: stable  Respiratory status: unassisted and spontaneous ventilation  Hydration status: euvolemic  Follow-up not needed.    Patient wide awake and denies dyspnea or pain.  No hoarseness.      Vitals Value Taken Time   /82 05/23/23 1700   Temp 36.7 °C (98 °F) 05/23/23 1700   Pulse 80 05/23/23 1700   Resp 20 05/23/23 1700   SpO2 98 % 05/23/23 1700         Event Time   Out of Recovery 16:50:55         Pain/Miguelina Score: Miguelina Score: 10 (5/23/2023  4:45 PM)

## 2023-05-23 NOTE — PLAN OF CARE
Nursing Transfer Note      5/23/2023     Reason patient is being transferred: Post-op    Transfer To: 1223    Transfer via bed    Transfer with cardiac monitoring    Transported by Bree [facility]    Telemetry: Box Number 8659, Rate 85, and Rhythm NSR    Medicines sent: N/A    Any special needs or follow-up needed: N/A    Chart send with patient: Yes    Notified: spouse    Patient reassessed at: 5/23, 1700 (date, time)  1  Upon arrival to floor: cardiac monitor applied, patient oriented to room, call bell in reach, and bed in lowest position

## 2023-05-23 NOTE — ANESTHESIA PROCEDURE NOTES
Intubation    Date/Time: 5/23/2023 8:41 AM  Performed by: Sony Bartholomew CRNA  Authorized by: Franko Kapoor MD     Intubation:     Induction:  Intravenous    Intubated:  Postinduction    Mask Ventilation:  Easy with oral airway    Attempts:  1    Attempted By:  Staff anesthesiologist    Method of Intubation:  Direct    Blade:  Cano 3    Laryngeal View Grade: Grade IIA - cords partially seen      Difficult Airway Encountered?: No      Complications:  None    Airway Device:  Oral endotracheal tube    Airway Device Size:  7.5    Style/Cuff Inflation:  Cuffed    Tube secured:  23    Secured at:  The lips    Placement Verified By:  Capnometry    Complicating Factors:  None    Findings Post-Intubation:  BS equal bilateral  Notes:      Intubated with eshman. Tolerated well . Abundant tissue.

## 2023-05-24 VITALS
WEIGHT: 285.5 LBS | OXYGEN SATURATION: 93 % | SYSTOLIC BLOOD PRESSURE: 149 MMHG | TEMPERATURE: 98 F | DIASTOLIC BLOOD PRESSURE: 80 MMHG | RESPIRATION RATE: 18 BRPM | HEIGHT: 69 IN | HEART RATE: 96 BPM | BODY MASS INDEX: 42.29 KG/M2

## 2023-05-24 LAB
ANION GAP SERPL CALC-SCNC: 8 MMOL/L (ref 8–16)
BUN SERPL-MCNC: 20 MG/DL (ref 8–23)
CALCIUM SERPL-MCNC: 8.6 MG/DL (ref 8.7–10.5)
CHLORIDE SERPL-SCNC: 102 MMOL/L (ref 95–110)
CO2 SERPL-SCNC: 26 MMOL/L (ref 23–29)
CREAT SERPL-MCNC: 1.2 MG/DL (ref 0.5–1.4)
EST. GFR  (NO RACE VARIABLE): >60 ML/MIN/1.73 M^2
GLUCOSE SERPL-MCNC: 117 MG/DL (ref 70–110)
POTASSIUM SERPL-SCNC: 4.3 MMOL/L (ref 3.5–5.1)
SODIUM SERPL-SCNC: 136 MMOL/L (ref 136–145)

## 2023-05-24 PROCEDURE — 36415 COLL VENOUS BLD VENIPUNCTURE: CPT | Performed by: SURGERY

## 2023-05-24 PROCEDURE — 25000003 PHARM REV CODE 250: Performed by: SURGERY

## 2023-05-24 PROCEDURE — 80048 BASIC METABOLIC PNL TOTAL CA: CPT | Performed by: SURGERY

## 2023-05-24 RX ORDER — FERROUS SULFATE, DRIED 160(50) MG
2 TABLET, EXTENDED RELEASE ORAL ONCE
Status: COMPLETED | OUTPATIENT
Start: 2023-05-24 | End: 2023-05-24

## 2023-05-24 RX ADMIN — ATORVASTATIN CALCIUM 20 MG: 20 TABLET, FILM COATED ORAL at 08:05

## 2023-05-24 RX ADMIN — GUAIFENESIN AND CODEINE PHOSPHATE 5 ML: 100; 10 SOLUTION ORAL at 01:05

## 2023-05-24 RX ADMIN — GUAIFENESIN AND CODEINE PHOSPHATE 5 ML: 100; 10 SOLUTION ORAL at 08:05

## 2023-05-24 RX ADMIN — PANTOPRAZOLE SODIUM 40 MG: 40 TABLET, DELAYED RELEASE ORAL at 05:05

## 2023-05-24 RX ADMIN — SPIRONOLACTONE 25 MG: 25 TABLET ORAL at 08:05

## 2023-05-24 RX ADMIN — ASPIRIN 81 MG: 81 TABLET, COATED ORAL at 08:05

## 2023-05-24 RX ADMIN — CALCIUM CARBONATE-VITAMIN D TAB 500 MG-200 UNIT 2 TABLET: 500-200 TAB at 07:05

## 2023-05-24 RX ADMIN — INDAPAMIDE 2.5 MG: 2.5 TABLET, FILM COATED ORAL at 08:05

## 2023-05-24 RX ADMIN — CEFDINIR 300 MG: 300 CAPSULE ORAL at 08:05

## 2023-05-24 NOTE — OP NOTE
Preop diagnosis enlarging multinodular goiter  Postop diagnosis same  Procedures thyroidectomy  Patient is placed supine on operating table where satisfactory general anesthesia.  Both arms were tucked and transverse shoulder roll was placed.  Neck and upper chest were prepped and draped sterile fashion.  I made a  generous transverse collar incision 1-2 fingerbreadths above the sternal notch.  Sub platysmal flaps were developed superiorly and inferiorly.  The strap muscles were divided in the midline exposing the thyroid gland.  Patient had a very large thyroid gland which was extended very high for the upper poles on either side and substernal on the left side and in the midline.  Very large isthmus nodule.  The strap muscles in the were divided on the right and left sides in the upper 3rd with the LigaSure.  I worked initially on the right side.  Took down the superior suspensory ligament using the LigaSure for hemostasis.  Very difficult exposure to get to the apex of the upper pole, it was very high.  The branches of the superior thyroid artery and vein were divided on surface of the gland with the LigaSure.  Once the superior pole was mobilized I worked on the isthmus region.  Elevated the gland off the trachea from side to side.  I elected to divide the isthmus to the right side of the large nodule.  This was done with the LigaSure.  I then worked on the left thyroid lobe.  The superior pole was mobilized in the same fashion as had been on the right.  Once again the exposure was difficult it was very high.  A portion of the left thyroid lobe with substernal.  This was elevated into the to the wound by keeping my dissection right on the surface of the gland dividing the numerous bridging artery and veins with the LigaSure.  I did not see the superior or inferior parathyroid gland on the left side.  Once the gland was elevated into the neck was able to be rotated out and I worked on the posterior lateral  surface of the gland.  I continued my dissection down to the ligament of Tavarez attachments which were broad.  I used a clamp cut and tie technique through the specimen.  Once it was removed it was inspected and no evidence of any parathyroid tissue was visibly seen.  The recurrent laryngeal nerve was in its normal position freed from the area of dissection.  With evidence satisfactory hemostasis I turned my attention back to the right side.  The lower pole right side was then mobilized by dividing the branches of the inferior thyroid artery and vein on the surface of the gland.  The inferior parathyroid gland was identified and preserved with its blood supply.  In mobilizing the posterior lateral aspect of the gland identified the superior parathyroid gland and preserved with its blood supply.  The dissection was carried down to the ligament of Tavarez attachments which were divided with the clamp cut and tie technique.  Here again the recurrent laryngeal nerve was freed from the area of dissection.  With evidence satisfactory hemostasis I placed some fibrillar in the neck.  The strap muscles were reapproximated with mattress sutures with 2-0 Vicryl.  They were reapproximated in midline with running chromic.  I placed an inferior Aguila-Moreira drain.  Platysma was closed interrupted Vicryl and the skin was closed with 4-0 Stratafix.  Patient tolerated procedure well the operating room in stable condition indication.

## 2023-05-24 NOTE — CARE UPDATE
05/23/23 2140   Patient Assessment/Suction   Level of Consciousness (AVPU) alert   Respiratory Effort Normal   PRE-TX-O2   Device (Oxygen Therapy) room air   SpO2 (!) 93 %   Pulse Oximetry Type Intermittent   $ Pulse Oximetry - Multiple Charge Pulse Oximetry - Multiple   Pulse 86   Resp 19   ETCO2   $ ETCO2 Usage At bedside  (not wearing pca pump dc)   Education   $ Education 15 min   Respiratory Evaluation   $ Care Plan Tech Time 15 min

## 2023-05-24 NOTE — UM SECONDARY REVIEW
No OP Note charted, unable to review for inpatient status.  CPT Code 40352 not found in IPO list.  Attempted to call Dr High at number listed and reached his office. Left message with nurse.  Sent secure chat to Dr High who has not yet read it and is not listed as online at this time.

## 2023-05-24 NOTE — NURSING
Pt in room aaox4, incision dressed c/d/I. Pt verbalized no needs. Kip drain in place. O2 in place and stern in place draining.

## 2023-05-24 NOTE — DISCHARGE SUMMARY
Final diagnosis enlarging multinodular goiter  Operation thyroidectomy  Clinical history patient is a gentleman followed for extended period of time with multinodular goiter.  He has numerous large nodules which had benign cytology biopsies.  Because of the continued increase in size he was admitted for thyroidectomy.  The patient is sent home the day after surgery.  He is tolerating diet well voice is normal.  He has a Aguila-Moreira drain in draining minimal amounts of old blood.  This drain will be left in place he will be given instructions regarding proper drainage and recording of output and follow up in the office on Friday.  His calcium was at the lower levels were normal and he will be placed on supplemental Os-Adryan with vitamin-D 500 2 p.o. b.i.d..  Synthroid 125 mcg per day with instructions  Patient will follow up in the office on Friday  History resume his home medications and given a prescription for Robitussin AC for cough.

## 2023-05-24 NOTE — NURSING
Avs is printed, and reviewed with pt, iv removed and tele taken off. Kip dressing changed and instructions on how to empty and record output. No other needs voiced at this time

## 2023-05-24 NOTE — PLAN OF CARE
05/24/23 1255   Final Note   Assessment Type Final Discharge Note   Anticipated Discharge Disposition Home   What phone number can be called within the next 1-3 days to see how you are doing after discharge? [phone]   Post-Acute Status   Coverage Medicare A&B   Discharge Delays None known at this time     Discharge orders and chart reviewed with no further post-acute discharge needs identified at this time.  At this time, patient is cleared for discharge from Case Management standpoint.

## 2023-05-24 NOTE — PLAN OF CARE
[facility]  Initial Discharge Assessment       Primary Care Provider: Yusuf Torres Iv, MD    Admission Diagnosis: Non-toxic goiter [E04.9]  Surgery, elective [Z41.9]  S/P complete thyroidectomy [E89.0]    Admission Date: 5/23/2023  Expected Discharge Date: 5/24/2023    Transition of Care Barriers: None    Assessment completed by chart review due to patient already being discharged.    Payor: MEDICARE / Plan: MEDICARE PART A & B / Product Type: Government /     Extended Emergency Contact Information  Primary Emergency Contact: Dodie White  Address: [address] [place] [address] [place] [address] [facility]  Home Phone: [phone]  Mobile Phone: [phone]  Relation: Spouse    Discharge Plan A: Home with family  Discharge Plan B: Home with family      [id number] - [place], [place] - [address] AT [facility] [address]  [address]  [place] [address]  Phone: [phone] Fax: [phone]      Initial Assessment (most recent)       Adult Discharge Assessment - 05/24/23 1251          Discharge Assessment    Assessment Type Discharge Planning Assessment     Confirmed/corrected address, phone number and insurance Yes     Confirmed Demographics Correct on Facesheet     Source of Information health record     If unable to respond/provide information was family/caregiver contacted? Yes     Does patient/caregiver understand observation status Yes     Communicated SYLVIA with patient/caregiver Yes     Reason For Admission non-toxic goiter     People in Home spouse     Facility Arrived From: home     Do you expect to return to your current living situation? Yes     Do you have help at home or someone to help you manage your care at home? Yes     Who are your caregiver(s) and their phone number(s)? Dodie White (Spouse)   [phone]     Prior to hospitilization cognitive status: Alert/Oriented     Current cognitive status: Alert/Oriented     Equipment Currently  Used at Home none     Readmission within 30 days? No     Patient currently being followed by outpatient case management? No     Do you currently have service(s) that help you manage your care at home? No     Do you take prescription medications? Yes     Do you have prescription coverage? Yes     Coverage Medicare     Do you have any problems affording any of your prescribed medications? No     Who is going to help you get home at discharge? spouse     How do you get to doctors appointments? car, drives self     Are you on dialysis? No     Do you take coumadin? No     Discharge Plan A Home with family     Discharge Plan B Home with family     DME Needed Upon Discharge  none     Transition of Care Barriers None

## 2023-06-08 ENCOUNTER — OFFICE VISIT (OUTPATIENT)
Dept: PULMONOLOGY | Facility: CLINIC | Age: 73
End: 2023-06-08
Payer: MEDICARE

## 2023-06-08 VITALS
SYSTOLIC BLOOD PRESSURE: 132 MMHG | HEART RATE: 42 BPM | OXYGEN SATURATION: 95 % | BODY MASS INDEX: 40.23 KG/M2 | WEIGHT: 272.38 LBS | DIASTOLIC BLOOD PRESSURE: 80 MMHG

## 2023-06-08 DIAGNOSIS — E66.01 MORBID OBESITY: ICD-10-CM

## 2023-06-08 DIAGNOSIS — R06.02 SHORTNESS OF BREATH: Primary | ICD-10-CM

## 2023-06-08 DIAGNOSIS — G47.33 OBSTRUCTIVE SLEEP APNEA SYNDROME: ICD-10-CM

## 2023-06-08 PROCEDURE — 99213 OFFICE O/P EST LOW 20 MIN: CPT | Mod: S$GLB,,, | Performed by: INTERNAL MEDICINE

## 2023-06-08 PROCEDURE — 99213 PR OFFICE/OUTPT VISIT, EST, LEVL III, 20-29 MIN: ICD-10-PCS | Mod: S$GLB,,, | Performed by: INTERNAL MEDICINE

## 2023-06-08 RX ORDER — GLUC/MSM/COLGN2/HYAL/ANTIARTH3 375-375-20
1 TABLET ORAL
COMMUNITY

## 2023-06-08 RX ORDER — LEVOTHYROXINE SODIUM 125 UG/1
125 TABLET ORAL
COMMUNITY
Start: 2023-05-24

## 2023-06-08 RX ORDER — BACLOFEN 20 MG
TABLET ORAL
COMMUNITY
Start: 2023-05-15

## 2023-06-08 RX ORDER — AZITHROMYCIN 250 MG/1
250 TABLET, FILM COATED ORAL
COMMUNITY
Start: 2023-05-16

## 2023-06-08 NOTE — PROGRESS NOTES
[facility]  Pulmonology  Follow-Up Clinic Visit    Subjective   Reason for Referral:    Srinivasa White is a 72 y.o. male referred by Dr. Zamora for evaluation of shortness of breath.    Chief Complaint   Patient presents with    Follow-up     Follow up hypothyroisium      10/4/2021:  Mr. Srinivasa White is a 71 year old gentleman with a history of CHF, HTN and prior COVID-19 who was in his usual state of health until several months ago when he developed exertional dyspnea.  3 months ago he was hospitalized with a CHF exacerbation.  His dypnea improved with IV diuretics and he was discharged home on oxygen.  He was referred her by his cardiologist;s NP.  He smoked 2 packs of cigarettes in the 1960's.  He is unaware of any intrinsic pulmonary disease.    He was in his usual state of health until 8 months ago, when he began experiencing dyspnea on exertion.  His symptoms were gradual in onset and stable since.  Symptoms occur while getting dressed.  Associated symptoms include lower extremity edema and orthopnea, but He denies any wheezing, sputum production, or pleurisy.  His symptoms are exacerbated by any exercise and improved with rest, oxygen and diuretics.   His weight has been stable, on diuretics.  His appetite has been unchanged.  The patient is having no constitutional symptoms, denying fever, chills, anorexia, or weight loss..  Work up so far has included CT chest, CXR and Echo.  Prior treatments include diuretics, which has provided some symptom releif.  He is currently prescribed lasix that he takes when he gains weight.  The patient reports adherence to this regimen.    He uses several pillows at night.   He currently is on oxygen at 2 L/min per nasal cannula..  He smoked 2 packs of cigarettes in his life.   He has (difficulty walking 50 yards on flat ground).  He has been hospitalized twice due to his breathing.    11/1/2021:  In pretty well since our last visit from a respiratory  "perspective.  He was seen by his nephrologist who switched him from furosemide to indepamide.  He has been taking this and has noticed that his edema is been better.  He had a 6 minute walk test which she completed without oxygen and did not desaturate.  Overall he is relatively free from respiratory symptoms at this time.  He did recently have a urinary tract infection the presented with some cystitis and flank pain.  He is currently taking ciprofloxacin for a total of 10 days.  His symptoms associated with his urinary tract infection improved over the weekend.    11/1/2022 - Here for follow up, Feels he is doing pretty well overall.  He has weaned himself off of O2.  Prior PFT showed mild restriction and normal DLCO.  He has not been tested for sleep apnea - he does snore but denies observed apneas.  He denies EDS.  He has some chronic nasal congestion.  He had Covid in 2020.      4/27/2023 - Here for follow up, he reports that over the last 3 weeks has noted increased exertional dyspnea and reports drops in sats to the 70's when walking.  Weight is up 13 # since last visit.  He has some cough with thick white secretions and intermittent wheezing.  No f,c,s, or CP.  His home sleep study showed mild STEPHANIE (DAMIEN - 8) and lowest sat 88%.    5/11/2023 - Here for follow up, PFT shows no obstruction, + mild restriction and mild reduction in DLCO but not significantly changed from 10/2021 other than FVC, FEV1 were improved.  Walk test was nonhypoxemic and showed good distance.  He feels the exertional dyspnea is better and wonders if he "had caught something".  No further drops in saturations noted at home.  He was in hospital overnight, CTA was poor quality study with no definite PE to my view.  Dopplers were negative and ECHO was normal with no pulmonary hypertension.    5/11/2023 - PFT (5/4/23)  - No obstruction  - Mild restriction (TLC 69%)  - Mild decreased DLCO  - since 10/2021 FVC and FEV1 are better  - nonhypoxemic " walk test    6/8/2023 - Here for follow up, overall doing well from a pulmonary standpoint.  Breathing is better and is back to normal.  On 5/23 he had removal of goiter and is recovering.  He has been able to mow his lawn with his wife.     Past Medical History:   Diagnosis Date    Arthritis     Asthma     Back pain     BPH (benign prostatic hyperplasia)     CHF (congestive heart failure) 2021    Digestive disorder     H/O colonoscopy 01/01/2005    Polyps removed    Hypertension     Thyroid disease      Past Surgical History:   Procedure Laterality Date    APPENDECTOMY  01/01/1974    COLONOSCOPY      LASER OF PROSTATE W/ GREEN LIGHT PVP  10/20/2015    left leg Schrap medat  01/01/1970    SKIN BIOPSY      THYROIDECTOMY N/A 5/23/2023    Procedure: THYROIDECTOMY;  Surgeon: Pat High MD;  Location: [facility];  Service: General;  Laterality: N/A;    TONSILLECTOMY  01/01/1958    WISDOM TOOTH EXTRACTION  01/01/1964     History reviewed. No pertinent family history.   Social History     Tobacco Use    Smoking status: Former     Types: Cigarettes    Smokeless tobacco: Never   Substance and Sexual Activity    Alcohol use: Yes     Alcohol/week: 0.0 standard drinks     Comment: occasional beer or wine monthly    Drug use: No    Sexual activity: Not on file      Allergies:   [id number]     Outpatient Medications as of 6/8/2023   Medication    atorvastatin (LIPITOR) 20 MG tablet    benzonatate (TESSALON) 100 MG capsule    felodipine (PLENDIL) 5 MG 24 hr tablet    indapamide (LOZOL) 2.5 MG Tab    olmesartan (BENICAR) 40 MG tablet    omeprazole (PRILOSEC) 40 MG capsule    spironolactone (ALDACTONE) 25 MG tablet    SYNTHROID 125 mcg tablet    tamsulosin (FLOMAX) 0.4 mg Cap    acetaminophen (TYLENOL) 500 MG tablet    aspirin (ECOTRIN) 81 MG EC tablet    azithromycin (Z-KAYLEN) 250 MG tablet    b complex vitamins capsule    calcium citrate malate-vit D3 (OSCAL) 250 mg-2.5 mcg (100 unit) Tab    ciclopirox (LOPROX)  0.77 % Crea    COCONUT OIL ORAL    fluorouraciL (EFUDEX) 5 % cream    glucosamine-chondroitin 500-400 mg tablet    inulin (FIBER GUMMIES ORAL)    Lactobacillus rhamnosus GG (CULTURELLE) 10 billion cell capsule    levocetirizine (XYZAL) 5 MG tablet    magnesium citrate 100 mg Cap    magnesium oxide 500 mg Tab     No current facility-administered medications on file as of 6/8/2023.      Review of Systems   Constitutional:  Positive for weight loss and fatigue. Negative for fever, chills and night sweats.   HENT:  Negative for postnasal drip, rhinorrhea, sinus pressure and congestion.    Eyes:  Negative for redness and itching.   Respiratory:  Positive for cough, shortness of breath, wheezing, orthopnea and dyspnea on extertion.    Cardiovascular:  Positive for leg swelling. Negative for chest pain and palpitations.   Genitourinary:  Negative for difficulty urinating and hematuria.   Endocrine:  Negative for polydipsia, polyphagia and polyuria.    Musculoskeletal:  Negative for gait problem, joint swelling and myalgias.   Skin:  Negative for rash.   Gastrointestinal:  Negative for nausea, vomiting, abdominal pain and acid reflux.   Neurological:  Negative for syncope, weakness and headaches.   Hematological:  Negative for adenopathy. Does not bruise/bleed easily and no excessive bruising.   Psychiatric/Behavioral:  Negative for confusion and sleep disturbance. The patient is not nervous/anxious.    All other systems reviewed and are negative.   Previous Reports Reviewed:   ER records, historical medical records, lab reports, nursing home notes, office notes, operative reports, radiology reports, referral letter/letters and x-ray reports   The following portions of the patient's history were reviewed and updated as appropriate: allergies, current medications, past family history, past medical history, past social history, past surgical history and problem list.    Objective:      /80 (BP Location: Left arm,  Patient Position: Sitting, BP Method: Medium (Manual))   Pulse (!) 42   Wt 123.6 kg (272 lb 6.4 oz)   SpO2 95%   BMI 40.23 kg/m²   Body mass index is 40.23 kg/m².     Physical Exam   Constitutional: He is oriented to person, place, and time. He appears well-developed and well-nourished. No distress.   HENT:   Head: Normocephalic.   Mouth/Throat: Oropharynx is clear and moist. Mallampati Score: III.   Neck: No thyromegaly present.   Cardiovascular: Normal rate, regular rhythm, normal heart sounds and intact distal pulses.   Pulmonary/Chest: Normal expansion, symmetric chest wall expansion and effort normal. He has no wheezes. He has no rhonchi. He has no rales.   Abdominal: Soft. Bowel sounds are normal. He exhibits no distension. There is no abdominal tenderness.   Musculoskeletal:         General: No tenderness, deformity or edema. Normal range of motion.      Cervical back: Normal range of motion and neck supple.   Lymphadenopathy: No supraclavicular adenopathy is present.     He has no cervical adenopathy.     He has no axillary adenopathy.   Neurological: He is alert and oriented to person, place, and time. No cranial nerve deficit.   Skin: Skin is warm and dry. No rash noted.   Psychiatric: He has a normal mood and affect.   Nursing note and vitals reviewed.   Personal Review of Relevant Diagnostic Studies:  I have personally reviewed and interpreted the following labs/studies/images.  6MWT:  10/21/2021:  Distance:  Predicted:  443.2 m  Actual:  426.72 m  SpO2:  Rest:  96% on RA  Exercise:  97% on RA.      PFTs:  10/21/2021:  FVC:  2.37 959%)  FEV1:  1.92 (64%)  FEV1/FVC:  81  T.91 (71%)  DLCO:  16.48 (67%)  My impression.  No obstruction.  Mild restriction.  Mild diffusion impairment.  2023  FVC - 82%  FEV1 - 89%  FEV1/FVC - 82%  TLC - 69%  DLCO - 65%  Moderate restriction, mild decrease DLCO  Nonhypoxemic walk test    CXR:  10/19/2021:  Blunting of the costophrenic angles, right greater than  left are consistent with small pleural effusions    BMP:  7/22/2021:  140 / 3.7 / 104 / 28 / 13 / 0.74 / 134  D-dimer:  <200 (normal)    NT-proBNP:  7/22/2021:  522    CTA chest:  2/19/2021:  My impression:  Small filling defect in a subsegmental artery that looks more like a flow void.  I am unconvinced that it represent a clot.  1.  Small focal peripheral filling defect in a subsegmental artery supplying the lateral segment of the right middle lobe likely reflects a chronic pulmonary embolism.  2.  The thoracic aorta is normal caliber with mild atherosclerosis    Assessment:       1. Shortness of breath    2. Morbid obesity    3. Obstructive sleep apnea syndrome                Plan:     SOB is better  HST showed mild disease and we will follow  RTC 6 month          Orders Placed This Visit:  No orders of the defined types were placed in this encounter.          Jorgito Kent MD  [facility] Pulmonary/Critical Care  06/08/2023

## 2023-12-07 ENCOUNTER — OFFICE VISIT (OUTPATIENT)
Dept: PULMONOLOGY | Facility: CLINIC | Age: 73
End: 2023-12-07
Payer: MEDICARE

## 2023-12-07 VITALS
DIASTOLIC BLOOD PRESSURE: 82 MMHG | OXYGEN SATURATION: 98 % | BODY MASS INDEX: 42.86 KG/M2 | SYSTOLIC BLOOD PRESSURE: 140 MMHG | HEART RATE: 66 BPM | WEIGHT: 290.19 LBS

## 2023-12-07 DIAGNOSIS — R06.02 SHORTNESS OF BREATH: Primary | ICD-10-CM

## 2023-12-07 DIAGNOSIS — G47.33 OBSTRUCTIVE SLEEP APNEA SYNDROME: ICD-10-CM

## 2023-12-07 DIAGNOSIS — E66.01 MORBID OBESITY: ICD-10-CM

## 2023-12-07 PROCEDURE — 99214 OFFICE O/P EST MOD 30 MIN: CPT | Mod: S$GLB,,, | Performed by: INTERNAL MEDICINE

## 2023-12-07 PROCEDURE — 99214 PR OFFICE/OUTPT VISIT, EST, LEVL IV, 30-39 MIN: ICD-10-PCS | Mod: S$GLB,,, | Performed by: INTERNAL MEDICINE

## 2023-12-07 RX ORDER — BLOOD SUGAR DIAGNOSTIC
STRIP MISCELLANEOUS
COMMUNITY
Start: 2023-10-19

## 2023-12-07 RX ORDER — UBIQUINOL 100 MG
CAPSULE ORAL
COMMUNITY
Start: 2023-10-19

## 2023-12-07 RX ORDER — CICLOPIROX 80 MG/ML
SOLUTION TOPICAL
COMMUNITY
Start: 2023-10-26

## 2023-12-07 RX ORDER — BLOOD-GLUCOSE METER
EACH MISCELLANEOUS
COMMUNITY
Start: 2023-10-19

## 2023-12-07 RX ORDER — LANCETS
EACH MISCELLANEOUS
COMMUNITY
Start: 2023-10-19

## 2023-12-07 NOTE — PROGRESS NOTES
[facility]  Pulmonology  Follow-Up Clinic Visit    Subjective   Reason for Referral:    Srinivasa White is a 73 y.o. male referred by Dr. Zamora for evaluation of shortness of breath.    Chief Complaint   Patient presents with    Follow-up     Follow up shortness of breath       10/4/2021:  Mr. Srinivasa White is a 71 year old gentleman with a history of CHF, HTN and prior COVID-19 who was in his usual state of health until several months ago when he developed exertional dyspnea.  3 months ago he was hospitalized with a CHF exacerbation.  His dypnea improved with IV diuretics and he was discharged home on oxygen.  He was referred her by his cardiologist;s NP.  He smoked 2 packs of cigarettes in the 1960's.  He is unaware of any intrinsic pulmonary disease.    He was in his usual state of health until 8 months ago, when he began experiencing dyspnea on exertion.  His symptoms were gradual in onset and stable since.  Symptoms occur while getting dressed.  Associated symptoms include lower extremity edema and orthopnea, but He denies any wheezing, sputum production, or pleurisy.  His symptoms are exacerbated by any exercise and improved with rest, oxygen and diuretics.   His weight has been stable, on diuretics.  His appetite has been unchanged.  The patient is having no constitutional symptoms, denying fever, chills, anorexia, or weight loss..  Work up so far has included CT chest, CXR and Echo.  Prior treatments include diuretics, which has provided some symptom releif.  He is currently prescribed lasix that he takes when he gains weight.  The patient reports adherence to this regimen.    He uses several pillows at night.   He currently is on oxygen at 2 L/min per nasal cannula..  He smoked 2 packs of cigarettes in his life.   He has (difficulty walking 50 yards on flat ground).  He has been hospitalized twice due to his breathing.    11/1/2021:  In pretty well since our last visit from a respiratory  "perspective.  He was seen by his nephrologist who switched him from furosemide to indepamide.  He has been taking this and has noticed that his edema is been better.  He had a 6 minute walk test which she completed without oxygen and did not desaturate.  Overall he is relatively free from respiratory symptoms at this time.  He did recently have a urinary tract infection the presented with some cystitis and flank pain.  He is currently taking ciprofloxacin for a total of 10 days.  His symptoms associated with his urinary tract infection improved over the weekend.    11/1/2022 - Here for follow up, Feels he is doing pretty well overall.  He has weaned himself off of O2.  Prior PFT showed mild restriction and normal DLCO.  He has not been tested for sleep apnea - he does snore but denies observed apneas.  He denies EDS.  He has some chronic nasal congestion.  He had Covid in 2020.      4/27/2023 - Here for follow up, he reports that over the last 3 weeks has noted increased exertional dyspnea and reports drops in sats to the 70's when walking.  Weight is up 13 # since last visit.  He has some cough with thick white secretions and intermittent wheezing.  No f,c,s, or CP.  His home sleep study showed mild STEPHANIE (DAMIEN - 8) and lowest sat 88%.    5/11/2023 - Here for follow up, PFT shows no obstruction, + mild restriction and mild reduction in DLCO but not significantly changed from 10/2021 other than FVC, FEV1 were improved.  Walk test was nonhypoxemic and showed good distance.  He feels the exertional dyspnea is better and wonders if he "had caught something".  No further drops in saturations noted at home.  He was in hospital overnight, CTA was poor quality study with no definite PE to my view.  Dopplers were negative and ECHO was normal with no pulmonary hypertension.    5/11/2023 - PFT (5/4/23)  - No obstruction  - Mild restriction (TLC 69%)  - Mild decreased DLCO  - since 10/2021 FVC and FEV1 are better  - nonhypoxemic " "walk test    6/8/2023 - Here for follow up, overall doing well from a pulmonary standpoint.  Breathing is better and is back to normal.  On 5/23 he had removal of goiter and is recovering.  He has been able to mow his lawn with his wife.    12/7/2023 - Here for follow up, has continued to recover from his thyroid surgery.  He is on thyroid replacement and needs further adjustment.  He does feel "low energy".    We discussed vaccine, he had flu shot, he is up to date on the pneumonia vaccine, he does not want to do the Covid vaccine and we discussed the RSV vaccine.       Past Medical History:   Diagnosis Date    Arthritis     Asthma     Back pain     BPH (benign prostatic hyperplasia)     CHF (congestive heart failure) 2021    Digestive disorder     H/O colonoscopy 01/01/2005    Polyps removed    Hypertension     Thyroid disease      Past Surgical History:   Procedure Laterality Date    APPENDECTOMY  01/01/1974    COLONOSCOPY      LASER OF PROSTATE W/ GREEN LIGHT PVP  10/20/2015    left leg Schrap medat  01/01/1970    SKIN BIOPSY      THYROIDECTOMY N/A 5/23/2023    Procedure: THYROIDECTOMY;  Surgeon: Pat High MD;  Location: [facility];  Service: General;  Laterality: N/A;    TONSILLECTOMY  01/01/1958    WISDOM TOOTH EXTRACTION  01/01/1964     History reviewed. No pertinent family history.   Social History     Tobacco Use    Smoking status: Former     Types: Cigarettes    Smokeless tobacco: Never   Substance and Sexual Activity    Alcohol use: Yes     Alcohol/week: 0.0 standard drinks of alcohol     Comment: occasional beer or wine monthly    Drug use: No    Sexual activity: Not on file      Allergies:   [id number]     Outpatient Medications as of 12/7/2023   Medication    ACCU-CHEK GUIDE GLUCOSE METER Misc    ACCU-CHEK GUIDE TEST STRIPS Strp    ACCU-CHEK SOFTCLIX LANCETS Misc    acetaminophen (TYLENOL) 500 MG tablet    ALCOHOL PREP PADS PadM    aspirin (ECOTRIN) 81 MG EC tablet    atorvastatin " (LIPITOR) 20 MG tablet    b complex vitamins capsule    benzonatate (TESSALON) 100 MG capsule    calcium citrate malate-vit D3 (OSCAL) 250 mg-2.5 mcg (100 unit) Tab    ciclopirox (LOPROX) 0.77 % Crea    ciclopirox (PENLAC) 8 % Soln    COCONUT OIL ORAL    felodipine (PLENDIL) 5 MG 24 hr tablet    fluorouraciL (EFUDEX) 5 % cream    glucosamine lew 2KCl-chondroit 500-400 mg Tab    glucosamine-chondroitin 500-400 mg tablet    indapamide (LOZOL) 2.5 MG Tab    inulin (FIBER GUMMIES ORAL)    Lactobacillus rhamnosus GG (CULTURELLE) 10 billion cell capsule    levocetirizine (XYZAL) 5 MG tablet    magnesium citrate 100 mg Cap    magnesium oxide 500 mg Tab    olmesartan (BENICAR) 40 MG tablet    omeprazole (PRILOSEC) 40 MG capsule    spironolactone (ALDACTONE) 25 MG tablet    SYNTHROID 125 mcg tablet    tamsulosin (FLOMAX) 0.4 mg Cap    azithromycin (Z-KAYLEN) 250 MG tablet     No current facility-administered medications on file as of 12/7/2023.      Review of Systems   Constitutional:  Positive for weight loss and fatigue. Negative for fever, chills and night sweats.   HENT:  Negative for postnasal drip, rhinorrhea, sinus pressure and congestion.    Eyes:  Negative for redness and itching.   Respiratory:  Positive for cough, shortness of breath, wheezing, orthopnea and dyspnea on extertion.    Cardiovascular:  Positive for leg swelling. Negative for chest pain and palpitations.   Genitourinary:  Negative for difficulty urinating and hematuria.   Endocrine:  Negative for polydipsia, polyphagia and polyuria.    Musculoskeletal:  Negative for gait problem, joint swelling and myalgias.   Skin:  Negative for rash.   Gastrointestinal:  Negative for nausea, vomiting, abdominal pain and acid reflux.   Neurological:  Negative for syncope, weakness and headaches.   Hematological:  Negative for adenopathy. Does not bruise/bleed easily and no excessive bruising.   Psychiatric/Behavioral:  Negative for confusion and sleep disturbance. The  patient is not nervous/anxious.    All other systems reviewed and are negative.     Previous Reports Reviewed:   ER records, historical medical records, lab reports, nursing home notes, office notes, operative reports, radiology reports, referral letter/letters and x-ray reports   The following portions of the patient's history were reviewed and updated as appropriate: allergies, current medications, past family history, past medical history, past social history, past surgical history and problem list.    Objective:      BP (!) 140/82 (BP Location: Left arm, Patient Position: Sitting, BP Method: Medium (Manual))   Pulse 66   Wt 131.6 kg (290 lb 3.2 oz)   SpO2 98%   BMI 42.86 kg/m²   Body mass index is 42.86 kg/m².     Physical Exam   Constitutional: He is oriented to person, place, and time. He appears well-developed and well-nourished. No distress.   HENT:   Head: Normocephalic.   Mouth/Throat: Oropharynx is clear and moist. Mallampati Score: III.   Neck: No thyromegaly present.   Cardiovascular: Normal rate, regular rhythm, normal heart sounds and intact distal pulses.   Pulmonary/Chest: Normal expansion, symmetric chest wall expansion and effort normal. He has no wheezes. He has no rhonchi. He has no rales.   Abdominal: Soft. Bowel sounds are normal. He exhibits no distension. There is no abdominal tenderness.   Musculoskeletal:         General: No tenderness, deformity or edema. Normal range of motion.      Cervical back: Normal range of motion and neck supple.   Lymphadenopathy: No supraclavicular adenopathy is present.     He has no cervical adenopathy.     He has no axillary adenopathy.   Neurological: He is alert and oriented to person, place, and time. No cranial nerve deficit.   Skin: Skin is warm and dry. No rash noted.   Psychiatric: He has a normal mood and affect.   Nursing note and vitals reviewed.     Personal Review of Relevant Diagnostic Studies:  I have personally reviewed and interpreted the  following labs/studies/images.  6MWT:  10/21/2021:  Distance:  Predicted:  443.2 m  Actual:  426.72 m  SpO2:  Rest:  96% on RA  Exercise:  97% on RA.      PFTs:  10/21/2021:  FVC:  2.37 959%)  FEV1:  1.92 (64%)  FEV1/FVC:  81  T.91 (71%)  DLCO:  16.48 (67%)  My impression.  No obstruction.  Mild restriction.  Mild diffusion impairment.  2023  FVC - 82%  FEV1 - 89%  FEV1/FVC - 82%  TLC - 69%  DLCO - 65%  Moderate restriction, mild decrease DLCO  Nonhypoxemic walk test    CXR:  10/19/2021:  Blunting of the costophrenic angles, right greater than left are consistent with small pleural effusions    BMP:  2021:  140 / 3.7 / 104 / 28 / 13 / 0.74 / 134  D-dimer:  <200 (normal)    NT-proBNP:  2021:  522    CTA chest:  2021:  My impression:  Small filling defect in a subsegmental artery that looks more like a flow void.  I am unconvinced that it represent a clot.  1.  Small focal peripheral filling defect in a subsegmental artery supplying the lateral segment of the right middle lobe likely reflects a chronic pulmonary embolism.  2.  The thoracic aorta is normal caliber with mild atherosclerosis    Assessment:       1. Shortness of breath    2. Morbid obesity    3. Obstructive sleep apnea syndrome                  Plan:     SOB is better  Continue to work on weight loss and exercise  HST showed mild disease and we will follow clinically  RTC 6 month          Orders Placed This Visit:  No orders of the defined types were placed in this encounter.          Jorgito Kent MD  [facility] Pulmonary/Critical Care  2023

## 2024-06-10 ENCOUNTER — TELEPHONE (OUTPATIENT)
Dept: UROLOGY | Facility: CLINIC | Age: 74
End: 2024-06-10
Payer: MEDICARE

## 2024-06-10 NOTE — TELEPHONE ENCOUNTER
Spoke with patient, states he had blood in his urine and went to [facility], they did CT and advised he follow up with urology. He will contact [facility] to have CT result faxed to the office and appt scheduled with NP, patient verbally understood.

## 2024-06-10 NOTE — TELEPHONE ENCOUNTER
----- Message from Beverly Hernadez sent at 6/10/2024  8:59 AM CDT -----  Type:  Sooner Appointment Request    Caller is requesting a sooner appointment.  Caller declined first available appointment listed below.  Caller will not accept being placed on the waitlist and is requesting a message be sent to doctor.    Name of Caller:  pt   When is the first available appointment?  June 12   Symptoms:  blood in urine / bladder   Would the patient rather a call back or a response via [facility]? Call   Best Call Back Number:  [phone] (home)     Additional Information:  please advise

## 2024-06-11 ENCOUNTER — OFFICE VISIT (OUTPATIENT)
Dept: PULMONOLOGY | Facility: CLINIC | Age: 74
End: 2024-06-11
Payer: MEDICARE

## 2024-06-11 ENCOUNTER — TELEPHONE (OUTPATIENT)
Dept: UROLOGY | Facility: CLINIC | Age: 74
End: 2024-06-11
Payer: MEDICARE

## 2024-06-11 VITALS
OXYGEN SATURATION: 97 % | DIASTOLIC BLOOD PRESSURE: 80 MMHG | SYSTOLIC BLOOD PRESSURE: 138 MMHG | HEART RATE: 88 BPM | BODY MASS INDEX: 39.02 KG/M2 | WEIGHT: 264.19 LBS

## 2024-06-11 DIAGNOSIS — E66.01 MORBID OBESITY: ICD-10-CM

## 2024-06-11 DIAGNOSIS — R06.02 SHORTNESS OF BREATH: Primary | ICD-10-CM

## 2024-06-11 DIAGNOSIS — G47.33 OBSTRUCTIVE SLEEP APNEA SYNDROME: ICD-10-CM

## 2024-06-11 PROCEDURE — 99214 OFFICE O/P EST MOD 30 MIN: CPT | Mod: S$GLB,,, | Performed by: INTERNAL MEDICINE

## 2024-06-11 RX ORDER — SEMAGLUTIDE 1.34 MG/ML
1 INJECTION, SOLUTION SUBCUTANEOUS
COMMUNITY
Start: 2024-04-06

## 2024-06-11 NOTE — TELEPHONE ENCOUNTER
----- Message from Enid Carranza sent at 6/11/2024  4:10 PM CDT -----  Regarding: ct scan imaging  Name of Who is Calling:  Pt called         What is the request in detail:    The pt was told that the drs office needs to request the imaging fax for the records to be sent. Mindy 10 ct scan imaging .Please advise     Fax request too : [phone]  Can the clinic reply by [facility]:    No       What Number to Call Back if not in [facility]:

## 2024-06-11 NOTE — PROGRESS NOTES
[facility]  Pulmonology  Follow-Up Clinic Visit    Subjective   Reason for Referral:    Srinivasa White is a 73 y.o. male referred by Dr. Zamora for evaluation of shortness of breath.    Chief Complaint   Patient presents with    Follow-up     Follow up shortness of breath 6 months       10/4/2021:  Mr. Srinivasa White is a 71 year old gentleman with a history of CHF, HTN and prior COVID-19 who was in his usual state of health until several months ago when he developed exertional dyspnea.  3 months ago he was hospitalized with a CHF exacerbation.  His dypnea improved with IV diuretics and he was discharged home on oxygen.  He was referred her by his cardiologist;s NP.  He smoked 2 packs of cigarettes in the 1960's.  He is unaware of any intrinsic pulmonary disease.    He was in his usual state of health until 8 months ago, when he began experiencing dyspnea on exertion.  His symptoms were gradual in onset and stable since.  Symptoms occur while getting dressed.  Associated symptoms include lower extremity edema and orthopnea, but He denies any wheezing, sputum production, or pleurisy.  His symptoms are exacerbated by any exercise and improved with rest, oxygen and diuretics.   His weight has been stable, on diuretics.  His appetite has been unchanged.  The patient is having no constitutional symptoms, denying fever, chills, anorexia, or weight loss..  Work up so far has included CT chest, CXR and Echo.  Prior treatments include diuretics, which has provided some symptom releif.  He is currently prescribed lasix that he takes when he gains weight.  The patient reports adherence to this regimen.    He uses several pillows at night.   He currently is on oxygen at 2 L/min per nasal cannula..  He smoked 2 packs of cigarettes in his life.   He has (difficulty walking 50 yards on flat ground).  He has been hospitalized twice due to his breathing.    11/1/2021:  In pretty well since our last visit from a  "respiratory perspective.  He was seen by his nephrologist who switched him from furosemide to indepamide.  He has been taking this and has noticed that his edema is been better.  He had a 6 minute walk test which she completed without oxygen and did not desaturate.  Overall he is relatively free from respiratory symptoms at this time.  He did recently have a urinary tract infection the presented with some cystitis and flank pain.  He is currently taking ciprofloxacin for a total of 10 days.  His symptoms associated with his urinary tract infection improved over the weekend.    11/1/2022 - Here for follow up, Feels he is doing pretty well overall.  He has weaned himself off of O2.  Prior PFT showed mild restriction and normal DLCO.  He has not been tested for sleep apnea - he does snore but denies observed apneas.  He denies EDS.  He has some chronic nasal congestion.  He had Covid in 2020.      4/27/2023 - Here for follow up, he reports that over the last 3 weeks has noted increased exertional dyspnea and reports drops in sats to the 70's when walking.  Weight is up 13 # since last visit.  He has some cough with thick white secretions and intermittent wheezing.  No f,c,s, or CP.  His home sleep study showed mild STEPHANIE (DAMIEN - 8) and lowest sat 88%.    5/11/2023 - Here for follow up, PFT shows no obstruction, + mild restriction and mild reduction in DLCO but not significantly changed from 10/2021 other than FVC, FEV1 were improved.  Walk test was nonhypoxemic and showed good distance.  He feels the exertional dyspnea is better and wonders if he "had caught something".  No further drops in saturations noted at home.  He was in hospital overnight, CTA was poor quality study with no definite PE to my view.  Dopplers were negative and ECHO was normal with no pulmonary hypertension.    5/11/2023 - PFT (5/4/23)  - No obstruction  - Mild restriction (TLC 69%)  - Mild decreased DLCO  - since 10/2021 FVC and FEV1 are better  - " "nonhypoxemic walk test    6/8/2023 - Here for follow up, overall doing well from a pulmonary standpoint.  Breathing is better and is back to normal.  On 5/23 he had removal of goiter and is recovering.  He has been able to mow his lawn with his wife.    12/7/2023 - Here for follow up, has continued to recover from his thyroid surgery.  He is on thyroid replacement and needs further adjustment.  He does feel "low energy".    We discussed vaccine, he had flu shot, he is up to date on the pneumonia vaccine, he does not want to do the Covid vaccine and we discussed the RSV vaccine.    6/11/2024 - Here for follow up, no new respiratory issues at this time.  He is passing some blood in his urine and is to see a urologist about this.  SOB is about the same.  No problems with sleep at this time.  Has lost a signifcantamount of weight since his last visit (on OZEMPIC)       Past Medical History:   Diagnosis Date    Arthritis     Asthma     Back pain     BPH (benign prostatic hyperplasia)     CHF (congestive heart failure) 2021    Digestive disorder     H/O colonoscopy 01/01/2005    Polyps removed    Hypertension     Thyroid disease      Past Surgical History:   Procedure Laterality Date    APPENDECTOMY  01/01/1974    COLONOSCOPY      LASER OF PROSTATE W/ GREEN LIGHT PVP  10/20/2015    left leg Schrap medat  01/01/1970    SKIN BIOPSY      THYROIDECTOMY N/A 5/23/2023    Procedure: THYROIDECTOMY;  Surgeon: Pat High MD;  Location: [facility];  Service: General;  Laterality: N/A;    TONSILLECTOMY  01/01/1958    WISDOM TOOTH EXTRACTION  01/01/1964     No family history on file.   Social History     Tobacco Use    Smoking status: Former     Types: Cigarettes    Smokeless tobacco: Never   Substance and Sexual Activity    Alcohol use: Yes     Alcohol/week: 0.0 standard drinks of alcohol     Comment: occasional beer or wine monthly    Drug use: No    Sexual activity: Not on file      Allergies:   [id number] "     Outpatient Medications as of 6/11/2024   Medication    ACCU-CHEK GUIDE GLUCOSE METER Misc    ACCU-CHEK GUIDE TEST STRIPS Strp    ACCU-CHEK SOFTCLIX LANCETS Misc    acetaminophen (TYLENOL) 500 MG tablet    ALCOHOL PREP PADS PadM    aspirin (ECOTRIN) 81 MG EC tablet    atorvastatin (LIPITOR) 20 MG tablet    b complex vitamins capsule    benzonatate (TESSALON) 100 MG capsule    calcium citrate malate-vit D3 (OSCAL) 250 mg-2.5 mcg (100 unit) Tab    ciclopirox (LOPROX) 0.77 % Crea    ciclopirox (PENLAC) 8 % Soln    COCONUT OIL ORAL    felodipine (PLENDIL) 5 MG 24 hr tablet    fluorouraciL (EFUDEX) 5 % cream    glucosamine lew 2KCl-chondroit 500-400 mg Tab    glucosamine-chondroitin 500-400 mg tablet    indapamide (LOZOL) 2.5 MG Tab    inulin (FIBER GUMMIES ORAL)    Lactobacillus rhamnosus GG (CULTURELLE) 10 billion cell capsule    levocetirizine (XYZAL) 5 MG tablet    magnesium citrate 100 mg Cap    magnesium oxide 500 mg Tab    olmesartan (BENICAR) 40 MG tablet    omeprazole (PRILOSEC) 40 MG capsule    OZEMPIC 1 mg/dose (4 mg/3 mL)    spironolactone (ALDACTONE) 25 MG tablet    SYNTHROID 125 mcg tablet    tamsulosin (FLOMAX) 0.4 mg Cap    azithromycin (Z-KAYLEN) 250 MG tablet     No current facility-administered medications on file as of 6/11/2024.      Review of Systems   Constitutional:  Positive for weight loss and fatigue. Negative for fever, chills and night sweats.   HENT:  Negative for postnasal drip, rhinorrhea, sinus pressure and congestion.    Eyes:  Negative for redness and itching.   Respiratory:  Positive for cough, shortness of breath, wheezing, orthopnea and dyspnea on extertion.    Cardiovascular:  Positive for leg swelling. Negative for chest pain and palpitations.   Genitourinary:  Negative for difficulty urinating and hematuria.   Endocrine:  Negative for polydipsia, polyphagia and polyuria.    Musculoskeletal:  Negative for gait problem, joint swelling and myalgias.   Skin:  Negative for rash.    Gastrointestinal:  Negative for nausea, vomiting, abdominal pain and acid reflux.   Neurological:  Negative for syncope, weakness and headaches.   Hematological:  Negative for adenopathy. Does not bruise/bleed easily and no excessive bruising.   Psychiatric/Behavioral:  Negative for confusion and sleep disturbance. The patient is not nervous/anxious.    All other systems reviewed and are negative.     Previous Reports Reviewed:   ER records, historical medical records, lab reports, nursing home notes, office notes, operative reports, radiology reports, referral letter/letters and x-ray reports   The following portions of the patient's history were reviewed and updated as appropriate: allergies, current medications, past family history, past medical history, past social history, past surgical history and problem list.    Objective:      /80 (BP Location: Left arm, Patient Position: Sitting, BP Method: Medium (Manual))   Pulse 88   Wt 119.8 kg (264 lb 3.2 oz)   SpO2 97%   BMI 39.02 kg/m²   Body mass index is 39.02 kg/m².     Physical Exam   Constitutional: He is oriented to person, place, and time. He appears well-developed and well-nourished. No distress.   HENT:   Head: Normocephalic.   Mouth/Throat: Oropharynx is clear and moist. Mallampati Score: III.   Neck: No thyromegaly present.   Cardiovascular: Normal rate, regular rhythm, normal heart sounds and intact distal pulses.   Pulmonary/Chest: Normal expansion, symmetric chest wall expansion and effort normal. He has no wheezes. He has no rhonchi. He has no rales.   Abdominal: Soft. Bowel sounds are normal. He exhibits no distension. There is no abdominal tenderness.   Musculoskeletal:         General: No tenderness, deformity or edema. Normal range of motion.      Cervical back: Normal range of motion and neck supple.   Lymphadenopathy: No supraclavicular adenopathy is present.     He has no cervical adenopathy.     He has no axillary adenopathy.    Neurological: He is alert and oriented to person, place, and time. No cranial nerve deficit.   Skin: Skin is warm and dry. No rash noted.   Psychiatric: He has a normal mood and affect.   Nursing note and vitals reviewed.     Personal Review of Relevant Diagnostic Studies:  I have personally reviewed and interpreted the following labs/studies/images.  6MWT:  10/21/2021:  Distance:  Predicted:  443.2 m  Actual:  426.72 m  SpO2:  Rest:  96% on RA  Exercise:  97% on RA.      PFTs:  10/21/2021:  FVC:  2.37 959%)  FEV1:  1.92 (64%)  FEV1/FVC:  81  T.91 (71%)  DLCO:  16.48 (67%)  My impression.  No obstruction.  Mild restriction.  Mild diffusion impairment.  2023  FVC - 82%  FEV1 - 89%  FEV1/FVC - 82%  TLC - 69%  DLCO - 65%  Moderate restriction, mild decrease DLCO  Nonhypoxemic walk test    CXR:  10/19/2021:  Blunting of the costophrenic angles, right greater than left are consistent with small pleural effusions    BMP:  2021:  140 / 3.7 / 104 / 28 / 13 / 0.74 / 134  D-dimer:  <200 (normal)    NT-proBNP:  2021:  522    CTA chest:  2021:  My impression:  Small filling defect in a subsegmental artery that looks more like a flow void.  I am unconvinced that it represent a clot.  1.  Small focal peripheral filling defect in a subsegmental artery supplying the lateral segment of the right middle lobe likely reflects a chronic pulmonary embolism.  2.  The thoracic aorta is normal caliber with mild atherosclerosis    Assessment:       1. Shortness of breath    2. Obstructive sleep apnea syndrome    3. Morbid obesity                    Plan:     SOB is about the same  Continue to work on weight loss and exercise  HST showed mild disease and we will follow clinically  To see urology regarding his hematuria  RTC 6 month          Orders Placed This Visit:  No orders of the defined types were placed in this encounter.          Jorgito Kent MD  [facility] Pulmonary/Critical Care  2024

## 2024-06-17 ENCOUNTER — OFFICE VISIT (OUTPATIENT)
Dept: UROLOGY | Facility: CLINIC | Age: 74
End: 2024-06-17
Payer: MEDICARE

## 2024-06-17 DIAGNOSIS — R31.0 GROSS HEMATURIA: Primary | ICD-10-CM

## 2024-06-17 LAB
BILIRUBIN, UA POC OHS: NEGATIVE
BLOOD, UA POC OHS: NEGATIVE
CLARITY, UA POC OHS: CLEAR
COLOR, UA POC OHS: YELLOW
GLUCOSE, UA POC OHS: NEGATIVE
KETONES, UA POC OHS: ABNORMAL
LEUKOCYTES, UA POC OHS: ABNORMAL
NITRITE, UA POC OHS: NEGATIVE
PH, UA POC OHS: 5
PROTEIN, UA POC OHS: NEGATIVE
SPECIFIC GRAVITY, UA POC OHS: 1.02
UROBILINOGEN, UA POC OHS: 0.2

## 2024-06-17 PROCEDURE — 99999 PR PBB SHADOW E&M-EST. PATIENT-LVL IV: CPT | Mod: PBBFAC,,, | Performed by: NURSE PRACTITIONER

## 2024-06-17 PROCEDURE — 99999PBSHW POCT URINALYSIS(INSTRUMENT): Mod: PBBFAC,,,

## 2024-06-17 PROCEDURE — 99214 OFFICE O/P EST MOD 30 MIN: CPT | Mod: S$PBB,,, | Performed by: NURSE PRACTITIONER

## 2024-06-17 PROCEDURE — 81003 URINALYSIS AUTO W/O SCOPE: CPT | Mod: PBBFAC,PO | Performed by: NURSE PRACTITIONER

## 2024-06-17 PROCEDURE — 99214 OFFICE O/P EST MOD 30 MIN: CPT | Mod: PBBFAC,PO | Performed by: NURSE PRACTITIONER

## 2024-06-17 PROCEDURE — 87086 URINE CULTURE/COLONY COUNT: CPT | Performed by: NURSE PRACTITIONER

## 2024-06-17 PROCEDURE — 88112 CYTOPATH CELL ENHANCE TECH: CPT | Performed by: PATHOLOGY

## 2024-06-17 RX ORDER — TAMSULOSIN HYDROCHLORIDE 0.4 MG/1
0.4 CAPSULE ORAL NIGHTLY
Qty: 90 CAPSULE | Refills: 3 | Status: SHIPPED | OUTPATIENT
Start: 2024-06-17 | End: 2024-09-15

## 2024-06-17 RX ORDER — CIPROFLOXACIN 500 MG/1
500 TABLET ORAL 2 TIMES DAILY
Qty: 28 TABLET | Refills: 0 | Status: SHIPPED | OUTPATIENT
Start: 2024-06-17 | End: 2024-07-01

## 2024-06-17 NOTE — H&P (VIEW-ONLY)
[facility] Note  Staff: COURTNEY Guevara    PCP: MD Melissa  Urologist:  MD Aida    Chief Complaint: Gross Hematuria with recent abnormal CT imaging    Subjective:        HPI: Srinivasa White is a 73 y.o. male presents today for evaluation of new onset gross hematuria at this time.     HX:  Pt last evaluated by Aida on 7/2022.  Hx of BPH and Gross Hematuria, Kidney Stones in the past.    OV 6/17/24:  Pt was recently evaluated at [facility] beds at the [facility] ED on 6/10/24 for gross hematuria symptoms.  Pt stated at the time he passed some blood clots and urine was bright red.  He currently takes Flomax 0.4 mg daily at this time.  UA today showed trace ketones, trace leuks, all other WNL.  He denies dysuria or problems with urination today.    CT Abdomen and Pelvis without contrast done 6/10/24:  IMPRESSION:  No obstructive urolithiasis or hydronephrosis.   Heterogeneous hyperdense material within the urinary bladder lumen, presumably hemorrhagic products. Underlying urinary bladder mass cannot be excluded. Further evaluation including urology referral for consideration of cystoscopy is recommended.   Enlarged prostate gland.   Colonic diverticulosis.   Several scattered superficial subcutaneous nodular densities in the right paramedian anterior chest wall and in the midline posterior upper back, nonspecific. Correlate clinically and with physical exam.   Additional findings as above.     OV 7/18/22:  Previous cysto showed enlargement of the prostate with bleeding noted. He was started on Finasteride and has continued Flomax.   CT and cytology were negative.      No further gross hematuria.   Nocturia x 2. Sleeps in a recliner.   No bothersome complaints during the day. He feels as though he empties well.      1/10/22  Patient had episode of gross hematuria approximately 6 months ago for which a IVP was ordered. This showed possible bilateral renal pelvis dilation, however it appears this may be extra  renal pelvices and visualization is difficult secondary to his very large stool burden. Kidneys appear to drain well.      He is without complaints today. No recent hematuria. No dysuria.      UA today negative   PVR 0 cc     3/1/21  Previous patient of Dr. Orlando. Underwent laser TURP in 10/2015 and noted improvement in his symptoms following this.   Has some dribbling. Rarely has a weak stream. Feels like he empties well. No significant frequency or urgency. No incontinence.   No hematuria. Mild dysuria with orange juice.   Not on any bladder medications.      Also has hx of diabetes insipida for which he was being treated with Desmopressin by his nephrology however has stopped this. Since he got COVID in October, he has had to sleep in a recliner and since then has not had as much of an issue with nocturia.      Hx of stones many years ago.      UA today: negative for blood, leuks, nitrite   PVR today: 0 cc     Last urine culture: no documented UTIs  Last creatinine: 0.8 (9/21/18)  Last PSA: 0.69 (1/29/20)     Family  hx: no malignancy   Hx of HTN, obesity     Past medical, family, and social history reviewed as documented in chart with pertinent positive medical, family, and social history detailed in HPI.    REVIEW OF SYSTEMS:  A comprehensive 10 system review was performed and is negative except as noted above in HPI    PMHx:  Past Medical History:   Diagnosis Date    Arthritis     Asthma     Back pain     BPH (benign prostatic hyperplasia)     CHF (congestive heart failure) 2021    Digestive disorder     H/O colonoscopy 01/01/2005    Polyps removed    Hypertension     Thyroid disease      PSHx:  Past Surgical History:   Procedure Laterality Date    APPENDECTOMY  01/01/1974    COLONOSCOPY      LASER OF PROSTATE W/ GREEN LIGHT PVP  10/20/2015    left leg Schrap medat  01/01/1970    SKIN BIOPSY      THYROIDECTOMY N/A 5/23/2023    Procedure: THYROIDECTOMY;  Surgeon: Pat High MD;  Location: [facility] OR;   "Service: General;  Laterality: N/A;    TONSILLECTOMY  01/01/1958    WISDOM TOOTH EXTRACTION  01/01/1964       Allergies:  [id number]    Medications: reviewed     Objective:   There were no vitals filed for this visit.    General:WDWN in NAD  Eyes: PERRLA, normal conjunctiva  Respiratory: no increased work on breathing, clear to auscultation  Cardiovascular: regular rate and rhythm. No obvious extremity edema.  GI: palpation of masses. No tenderness. No hepatosplenomegaly to palpation.  Musculoskeletal: normal range of motion of bilateral upper extremities. Normal muscle strength and tone.  Skin: no obvious rashes or lesions. No tightening of skin noted.  Neurologic: CN grossly normal. Normal sensation.   Psychiatric: awake, alert and oriented x 3. Mood and affect normal. Cooperative.    Assessment:       1. Gross hematuria          Plan:     Urine Culture and Cytology to be processed.  In the meantime, will prescribe Cipro 500 mg BID x 14 days.  CT Urogram with and without contrast to be scheduled for further evaluation of recent findings at this time.    I have thoroughly reviewed with pt and family on conclusion of ov today, that Gross Hematuria workup would include but not be limited to a Cystoscopy  procedure for lower tract evaluation by one of our Urologists in the near future in order for workup to be completed.  Instructions regarding Cystoscopy procedure was thoroughly reviewed with pt during office visit today.  All questions answered at this time.      "START TIMED VOIDING/BLADDER TRAINING" ASAP!!:  WHETHER YOU FEEL AN URGE TO URINATE OR NOT, YOU SHOULD BE FREQUENTING THE TOILET AND ATTEMPT TO URINATE EVERY 3 HOURS!!  NEVER POSTPONE URINATING OR HOLD YOUR URINE.    MAKE SURE YOU ARE HAVING REGULAR/NORMAL BOWEL MOVEMENTS AS THIS ALSO WILL HAVE AN EFFECT ON HOW YOUR BLADDER FUNCTIONS.    NOTHING TO EAT OR DRINK BY MOUTH (THIS INCLUDES WATER) AT LEAST 1-2 HOURS PRIOR TO YOUR BEDTIME " ROUTINE.    F/u TBD after we receive and review all labs, urine, imaging results in the near future.  Pt verbalized understanding.      Ct Peoples, CONRADC

## 2024-06-17 NOTE — H&P (VIEW-ONLY)
[facility] Note  Staff: COURTNEY Guevara    PCP: MD Melissa  Urologist:  MD Aida    Chief Complaint: Gross Hematuria with recent abnormal CT imaging    Subjective:        HPI: Srinivasa White is a 73 y.o. male presents today for evaluation of new onset gross hematuria at this time.     HX:  Pt last evaluated by Aida on 7/2022.  Hx of BPH and Gross Hematuria, Kidney Stones in the past.    OV 6/17/24:  [facility].  Pt stated at the time he passed some blood clots and urine was bright red.  He currently takes Flomax 0.4 mg daily at this time.  UA today showed trace ketones, trace leuks, all other WNL.  He denies dysuria or problems with urination today.    CT Abdomen and Pelvis without contrast done 6/10/24:  IMPRESSION:  No obstructive urolithiasis or hydronephrosis.   Heterogeneous hyperdense material within the urinary bladder lumen, presumably hemorrhagic products. Underlying urinary bladder mass cannot be excluded. Further evaluation including urology referral for consideration of cystoscopy is recommended.   Enlarged prostate gland.   Colonic diverticulosis.   Several scattered superficial subcutaneous nodular densities in the right paramedian anterior chest wall and in the midline posterior upper back, nonspecific. Correlate clinically and with physical exam.   Additional findings as above.     OV 7/18/22:  Previous cysto showed enlargement of the prostate with bleeding noted. He was started on Finasteride and has continued Flomax.   CT and cytology were negative.      No further gross hematuria.   Nocturia x 2. Sleeps in a recliner.   No bothersome complaints during the day. He feels as though he empties well.      1/10/22  Patient had episode of gross hematuria approximately 6 months ago for which a IVP was ordered. This showed possible bilateral renal pelvis dilation, however it appears this may be extra  renal pelvices and visualization is difficult secondary to his very large stool burden. Kidneys appear to drain well.      He is without complaints today. No recent hematuria. No dysuria.      UA today negative   PVR 0 cc     3/1/21  Previous patient of Dr. Orlando. Underwent laser TURP in 10/2015 and noted improvement in his symptoms following this.   Has some dribbling. Rarely has a weak stream. Feels like he empties well. No significant frequency or urgency. No incontinence.   No hematuria. Mild dysuria with orange juice.   Not on any bladder medications.      Also has hx of diabetes insipida for which he was being treated with Desmopressin by his nephrology however has stopped this. Since he got COVID in October, he has had to sleep in a recliner and since then has not had as much of an issue with nocturia.      Hx of stones many years ago.      UA today: negative for blood, leuks, nitrite   PVR today: 0 cc     Last urine culture: no documented UTIs  Last creatinine: 0.8 (9/21/18)  Last PSA: 0.69 (1/29/20)     Family  hx: no malignancy   Hx of HTN, obesity     Past medical, family, and social history reviewed as documented in chart with pertinent positive medical, family, and social history detailed in HPI.    REVIEW OF SYSTEMS:  A comprehensive 10 system review was performed and is negative except as noted above in HPI    PMHx:  Past Medical History:   Diagnosis Date    Arthritis     Asthma     Back pain     BPH (benign prostatic hyperplasia)     CHF (congestive heart failure) 2021    Digestive disorder     H/O colonoscopy 01/01/2005    Polyps removed    Hypertension     Thyroid disease      PSHx:  Past Surgical History:   Procedure Laterality Date    APPENDECTOMY  01/01/1974    COLONOSCOPY      LASER OF PROSTATE W/ GREEN LIGHT PVP  10/20/2015    left leg Schrap medat  01/01/1970    SKIN BIOPSY      THYROIDECTOMY N/A 5/23/2023    Procedure: THYROIDECTOMY;  Surgeon: Pat High MD;  Location: [facility] OR;   "Service: General;  Laterality: N/A;    TONSILLECTOMY  01/01/1958    WISDOM TOOTH EXTRACTION  01/01/1964       Allergies:  [id number]    Medications: reviewed     Objective:   There were no vitals filed for this visit.    General:WDWN in NAD  Eyes: PERRLA, normal conjunctiva  Respiratory: no increased work on breathing, clear to auscultation  Cardiovascular: regular rate and rhythm. No obvious extremity edema.  GI: palpation of masses. No tenderness. No hepatosplenomegaly to palpation.  Musculoskeletal: normal range of motion of bilateral upper extremities. Normal muscle strength and tone.  Skin: no obvious rashes or lesions. No tightening of skin noted.  Neurologic: CN grossly normal. Normal sensation.   Psychiatric: awake, alert and oriented x 3. Mood and affect normal. Cooperative.    Assessment:       1. Gross hematuria          Plan:     Urine Culture and Cytology to be processed.  In the meantime, will prescribe Cipro 500 mg BID x 14 days.  CT Urogram with and without contrast to be scheduled for further evaluation of recent findings at this time.    I have thoroughly reviewed with pt and family on conclusion of ov today, that Gross Hematuria workup would include but not be limited to a Cystoscopy  procedure for lower tract evaluation by one of our Urologists in the near future in order for workup to be completed.  Instructions regarding Cystoscopy procedure was thoroughly reviewed with pt during office visit today.  All questions answered at this time.      "START TIMED VOIDING/BLADDER TRAINING" ASAP!!:  WHETHER YOU FEEL AN URGE TO URINATE OR NOT, YOU SHOULD BE FREQUENTING THE TOILET AND ATTEMPT TO URINATE EVERY 3 HOURS!!  NEVER POSTPONE URINATING OR HOLD YOUR URINE.    MAKE SURE YOU ARE HAVING REGULAR/NORMAL BOWEL MOVEMENTS AS THIS ALSO WILL HAVE AN EFFECT ON HOW YOUR BLADDER FUNCTIONS.    NOTHING TO EAT OR DRINK BY MOUTH (THIS INCLUDES WATER) AT LEAST 1-2 HOURS PRIOR TO YOUR BEDTIME " ROUTINE.    F/u TBD after we receive and review all labs, urine, imaging results in the near future.  Pt verbalized understanding.      Ct Peoples, CONRADC

## 2024-06-17 NOTE — PROGRESS NOTES
[facility] Note  Staff: COURTNEY Guevara    PCP: MD Melissa  Urologist:  MD Aida    Chief Complaint: Gross Hematuria with recent abnormal CT imaging    Subjective:        HPI: Srinivasa White is a 73 y.o. male presents today for evaluation of new onset gross hematuria at this time.     HX:  Pt last evaluated by Aida on 7/2022.  Hx of BPH and Gross Hematuria, Kidney Stones in the past.    OV 6/17/24:  Pt was recently evaluated at [facility] ED on 6/10/24 for gross hematuria symptoms.  Pt stated at the time he passed some blood clots and urine was bright red.  He currently takes Flomax 0.4 mg daily at this time.  UA today showed trace ketones, trace leuks, all other WNL.  He denies dysuria or problems with urination today.    CT Abdomen and Pelvis without contrast done 6/10/24:  IMPRESSION:  No obstructive urolithiasis or hydronephrosis.   Heterogeneous hyperdense material within the urinary bladder lumen, presumably hemorrhagic products. Underlying urinary bladder mass cannot be excluded. Further evaluation including urology referral for consideration of cystoscopy is recommended.   Enlarged prostate gland.   Colonic diverticulosis.   Several scattered superficial subcutaneous nodular densities in the right paramedian anterior chest wall and in the midline posterior upper back, nonspecific. Correlate clinically and with physical exam.   Additional findings as above.     OV 7/18/22:  Previous cysto showed enlargement of the prostate with bleeding noted. He was started on Finasteride and has continued Flomax.   CT and cytology were negative.      No further gross hematuria.   Nocturia x 2. Sleeps in a recliner.   No bothersome complaints during the day. He feels as though he empties well.      1/10/22  Patient had episode of gross hematuria approximately 6 months ago for which a IVP was ordered. This showed possible bilateral renal pelvis dilation, however it appears this may be extra  renal pelvices and visualization is difficult secondary to his very large stool burden. Kidneys appear to drain well.      He is without complaints today. No recent hematuria. No dysuria.      UA today negative   PVR 0 cc     3/1/21  Previous patient of Dr. Orlando. Underwent laser TURP in 10/2015 and noted improvement in his symptoms following this.   Has some dribbling. Rarely has a weak stream. Feels like he empties well. No significant frequency or urgency. No incontinence.   No hematuria. Mild dysuria with orange juice.   Not on any bladder medications.      Also has hx of diabetes insipida for which he was being treated with Desmopressin by his nephrology however has stopped this. Since he got COVID in October, he has had to sleep in a recliner and since then has not had as much of an issue with nocturia.      Hx of stones many years ago.      UA today: negative for blood, leuks, nitrite   PVR today: 0 cc     Last urine culture: no documented UTIs  Last creatinine: 0.8 (9/21/18)  Last PSA: 0.69 (1/29/20)     Family  hx: no malignancy   Hx of HTN, obesity     Past medical, family, and social history reviewed as documented in chart with pertinent positive medical, family, and social history detailed in HPI.    REVIEW OF SYSTEMS:  A comprehensive 10 system review was performed and is negative except as noted above in HPI    PMHx:  Past Medical History:   Diagnosis Date    Arthritis     Asthma     Back pain     BPH (benign prostatic hyperplasia)     CHF (congestive heart failure) 2021    Digestive disorder     H/O colonoscopy 01/01/2005    Polyps removed    Hypertension     Thyroid disease      PSHx:  Past Surgical History:   Procedure Laterality Date    APPENDECTOMY  01/01/1974    COLONOSCOPY      LASER OF PROSTATE W/ GREEN LIGHT PVP  10/20/2015    left leg Schrap medat  01/01/1970    SKIN BIOPSY      THYROIDECTOMY N/A 5/23/2023    Procedure: THYROIDECTOMY;  Surgeon: Pat High MD;  Location: [facility] OR;   "Service: General;  Laterality: N/A;    TONSILLECTOMY  01/01/1958    WISDOM TOOTH EXTRACTION  01/01/1964       Allergies:  Lludgyja-yrbqcezdaok-gytoloceg    Medications: reviewed     Objective:   There were no vitals filed for this visit.    General:WDWN in NAD  Eyes: PERRLA, normal conjunctiva  Respiratory: no increased work on breathing, clear to auscultation  Cardiovascular: regular rate and rhythm. No obvious extremity edema.  GI: palpation of masses. No tenderness. No hepatosplenomegaly to palpation.  Musculoskeletal: normal range of motion of bilateral upper extremities. Normal muscle strength and tone.  Skin: no obvious rashes or lesions. No tightening of skin noted.  Neurologic: CN grossly normal. Normal sensation.   Psychiatric: awake, alert and oriented x 3. Mood and affect normal. Cooperative.    Assessment:       1. Gross hematuria          Plan:     Urine Culture and Cytology to be processed.  In the meantime, will prescribe Cipro 500 mg BID x 14 days.  CT Urogram with and without contrast to be scheduled for further evaluation of recent findings at this time.    I have thoroughly reviewed with pt and family on conclusion of ov today, that Gross Hematuria workup would include but not be limited to a Cystoscopy  procedure for lower tract evaluation by one of our Urologists in the near future in order for workup to be completed.  Instructions regarding Cystoscopy procedure was thoroughly reviewed with pt during office visit today.  All questions answered at this time.      "START TIMED VOIDING/BLADDER TRAINING" ASAP!!:  WHETHER YOU FEEL AN URGE TO URINATE OR NOT, YOU SHOULD BE FREQUENTING THE TOILET AND ATTEMPT TO URINATE EVERY 3 HOURS!!  NEVER POSTPONE URINATING OR HOLD YOUR URINE.    MAKE SURE YOU ARE HAVING REGULAR/NORMAL BOWEL MOVEMENTS AS THIS ALSO WILL HAVE AN EFFECT ON HOW YOUR BLADDER FUNCTIONS.    NOTHING TO EAT OR DRINK BY MOUTH (THIS INCLUDES WATER) AT LEAST 1-2 HOURS PRIOR TO YOUR BEDTIME " ROUTINE.    F/u TBD after we receive and review all labs, urine, imaging results in the near future.  Pt verbalized understanding.      Ct Peoples, CONRADC

## 2024-06-18 LAB — BACTERIA UR CULT: NO GROWTH

## 2024-06-19 LAB
FINAL PATHOLOGIC DIAGNOSIS: NORMAL
Lab: NORMAL

## 2024-06-21 ENCOUNTER — HOSPITAL ENCOUNTER (OUTPATIENT)
Dept: RADIOLOGY | Facility: HOSPITAL | Age: 74
Discharge: HOME | End: 2024-06-21
Attending: NURSE PRACTITIONER
Payer: MEDICARE

## 2024-06-21 DIAGNOSIS — R31.0 GROSS HEMATURIA: ICD-10-CM

## 2024-06-21 PROCEDURE — 74178 CT ABD&PLV WO CNTR FLWD CNTR: CPT | Mod: 26,,, | Performed by: RADIOLOGY

## 2024-06-21 PROCEDURE — 74178 CT ABD&PLV WO CNTR FLWD CNTR: CPT | Mod: TC

## 2024-06-21 PROCEDURE — 25500020 PHARM REV CODE 255: Performed by: NURSE PRACTITIONER

## 2024-06-21 RX ADMIN — IOHEXOL 100 ML: 350 INJECTION, SOLUTION INTRAVENOUS at 10:06

## 2024-06-26 ENCOUNTER — TELEPHONE (OUTPATIENT)
Dept: UROLOGY | Facility: CLINIC | Age: 74
End: 2024-06-26
Payer: MEDICARE

## 2024-06-26 DIAGNOSIS — R31.0 GROSS HEMATURIA: Primary | ICD-10-CM

## 2024-06-26 NOTE — TELEPHONE ENCOUNTER
----- Message from Lise Parra MD sent at 6/21/2024  1:27 PM CDT -----  Regarding: RE: Gross Hematuria-Abnormal CTU  Had a scope 2 years ago and like is just clot or from his BPH, but if he keeps having this gross hematuria we should consider doing something.   Can repeat his scope July 10 or later  ----- Message -----  From: Ct Peoples FNP  Sent: 6/21/2024  12:04 PM CDT  To: Lise Parra MD  Subject: Gross Hematuria-Abnormal CTU                     Established pt of yours I started workup this week I believe with new onset Gross Hematuria    CTU received today shows:  Large hyperdense intraluminal bladder mass.  But potentially representing blood clot, intraluminal neoplasm is not excluded and cystoscopic correlation is warranted.     Enlargement of the prostate gland which results in mass effect upon the floor of the urinary bladder.    PLEASE REVIEW AND ADVISE.  THANKS.

## 2024-06-26 NOTE — TELEPHONE ENCOUNTER
----- Message from Rocky Fisher MA sent at 6/26/2024 11:42 AM CDT -----  Contact: Self  Patient is calling back  ----- Message -----  From: Kenny Hernandez  Sent: 6/26/2024  11:34 AM CDT  To: Aida GILBERT Staff    Type: Needs Medical Advice  Who Called:  PT    Best Call Back Number: [phone]   Additional Information: Please call to schedule cytoscope

## 2024-06-26 NOTE — TELEPHONE ENCOUNTER
Urology Telephone Encounter:    Spoke with pt this afternoon.  Informed pt Dr. Parra for further evaluation of Gross Hematuria.  Pt will be scheduled for Cysto on 7/10/24 with Dr. Parra (orders are in Epic)  Should pt have to reschedule then they will need to contact MD's nurse.  Pt confirmed the date of 7/10/24 at this time.  Orders are in.  Preop will contact pt prior to procedure for further information for arrival.

## 2024-06-26 NOTE — PROGRESS NOTES
Procedure Order to Urology [[id number]]    Electronically signed by: Ct Peoples FNP on 06/26/24 0830 Status: Active   Ordering user: Ct Peoples FNP 06/26/24 0830 Authorized by: Ct Peoples FNP   Ordering mode: Standard   Frequency:  06/26/24 -     Diagnoses  Gross hematuria [R31.0]   Questionnaire    Question Answer   Procedure Cystoscopy Comment - 7/10/24 Aida   Facility Name: [facility], Please order Urine POCT without micro . Local sedation (no urine Dr Monroe or Dr johnston)

## 2024-07-08 ENCOUNTER — TELEPHONE (OUTPATIENT)
Dept: UROLOGY | Facility: CLINIC | Age: 74
End: 2024-07-08
Payer: MEDICARE

## 2024-07-08 NOTE — TELEPHONE ENCOUNTER
----- Message from Dodie Cavazos sent at 7/8/2024 10:40 AM CDT -----  Type: Needs Medical Advice  Who Called:  pt  Symptoms (please be specific):    How long has patient had these symptoms:    Pharmacy name and phone #:    Best Call Back Number: [phone]    Additional Information: Pt is calling because he has cysto scheduled for the 10th but has a bloody urine.  Not sure if you will be able to see anything with that .  Please call [facility] [facility]

## 2024-07-10 ENCOUNTER — HOSPITAL ENCOUNTER (OUTPATIENT)
Facility: HOSPITAL | Age: 74
Discharge: HOME | End: 2024-07-10
Attending: STUDENT IN AN ORGANIZED HEALTH CARE EDUCATION/TRAINING PROGRAM | Admitting: STUDENT IN AN ORGANIZED HEALTH CARE EDUCATION/TRAINING PROGRAM
Payer: MEDICARE

## 2024-07-10 DIAGNOSIS — N40.0 BPH WITHOUT OBSTRUCTION/LOWER URINARY TRACT SYMPTOMS: Primary | ICD-10-CM

## 2024-07-10 DIAGNOSIS — R31.0 GROSS HEMATURIA: ICD-10-CM

## 2024-07-10 PROCEDURE — 52000 CYSTOURETHROSCOPY: CPT | Performed by: STUDENT IN AN ORGANIZED HEALTH CARE EDUCATION/TRAINING PROGRAM

## 2024-07-10 PROCEDURE — 52001 CYSTO W/IRRG&EVAC MLT CLOTS: CPT | Mod: ,,, | Performed by: STUDENT IN AN ORGANIZED HEALTH CARE EDUCATION/TRAINING PROGRAM

## 2024-07-10 PROCEDURE — 52001 CYSTO W/IRRG&EVAC MLT CLOTS: CPT | Performed by: STUDENT IN AN ORGANIZED HEALTH CARE EDUCATION/TRAINING PROGRAM

## 2024-07-10 RX ORDER — FINASTERIDE 5 MG/1
5 TABLET, FILM COATED ORAL DAILY
Qty: 30 TABLET | Refills: 11 | Status: SHIPPED | OUTPATIENT
Start: 2024-07-10 | End: 2025-07-10

## 2024-07-10 RX ORDER — CIPROFLOXACIN 2 MG/ML
400 INJECTION, SOLUTION INTRAVENOUS
Status: CANCELLED | OUTPATIENT
Start: 2024-07-10

## 2024-07-10 RX ORDER — SULFAMETHOXAZOLE AND TRIMETHOPRIM 800; 160 MG/1; MG/1
1 TABLET ORAL 2 TIMES DAILY
Qty: 6 TABLET | Refills: 0 | Status: SHIPPED | OUTPATIENT
Start: 2024-07-10 | End: 2024-07-13

## 2024-07-10 NOTE — OR NURSING
PAT done via phone. All preop instructions reviewed with verbalized understanding. Also sent to [email or web address], for review. To  call with any questions.

## 2024-07-10 NOTE — DISCHARGE SUMMARY
[facility]  Discharge Note  Short Stay    Admit Date: 7/10/2024    Discharge Date and Time: 07/10/2024 10:40 AM      Attending Physician: Lise Parra MD     Discharge Provider: Lise Parra    Diagnoses:  Active Hospital Problems    Diagnosis  POA    *BPH without obstruction/lower urinary tract symptoms [N40.0]  Yes      Resolved Hospital Problems   No resolved problems to display.       Discharged Condition: good    Hospital Course: Patient was admitted for outpatient procedure and tolerated the procedure well with no complications. The patient was discharged home in good condition on the same day.       Final Diagnoses: Same as principal problem.    Disposition: Home or Self Care    Follow up/Patient Instructions:    Medications:  Reconciled Home Medications:   Current Discharge Medication List        START taking these medications    Details   sulfamethoxazole-trimethoprim 800-160mg (BACTRIM DS) 800-160 mg Tab Take 1 tablet by mouth 2 (two) times daily. for 3 days  Qty: 6 tablet, Refills: 0           CONTINUE these medications which have NOT CHANGED    Details   ACCU-CHEK GUIDE GLUCOSE METER Misc       ACCU-CHEK GUIDE TEST STRIPS Strp       ACCU-CHEK SOFTCLIX LANCETS Misc Apply topically.      acetaminophen (TYLENOL) 500 MG tablet Take 2 tablets by mouth every 8 (eight) hours as needed.      ALCOHOL PREP PADS PadM SMARTSIG:Each Topical      aspirin (ECOTRIN) 81 MG EC tablet Take 81 mg by mouth once daily.      atorvastatin (LIPITOR) 20 MG tablet Take 20 mg by mouth once daily.      azithromycin (Z-KAYLEN) 250 MG tablet Take 250 mg by mouth.      b complex vitamins capsule Take 1 capsule by mouth once daily.      benzonatate (TESSALON) 100 MG capsule Take 100 mg by mouth 3 (three) times daily as needed.      calcium citrate malate-vit D3 (OSCAL) 250 mg-2.5 mcg (100 unit) Tab Take 1 tablet by mouth.      ciclopirox (LOPROX) 0.77 % Crea Apply 1 application topically daily as needed.       ciclopirox (PENLAC) 8 % Soln Apply topically.      COCONUT OIL ORAL Take 1 capsule by mouth once daily.      felodipine (PLENDIL) 5 MG 24 hr tablet Take 5 mg by mouth once daily.      fluorouraciL (EFUDEX) 5 % cream SMARTSI sparingly Topical Every Night      glucosamine lew 2KCl-chondroit 500-400 mg Tab Take 1 tablet by mouth.      glucosamine-chondroitin 500-400 mg tablet Take 1 tablet by mouth 2 (two) times a day. 1500mg/MSM 1500mg      indapamide (LOZOL) 2.5 MG Tab Take 2.5 mg by mouth once daily.      inulin (FIBER GUMMIES ORAL) Take 1 capsule by mouth once daily.      Lactobacillus rhamnosus GG (CULTURELLE) 10 billion cell capsule Take 1 capsule by mouth once daily.      levocetirizine (XYZAL) 5 MG tablet Take 5 mg by mouth.      magnesium citrate 100 mg Cap Take 1 capsule by mouth.      magnesium oxide 500 mg Tab 1 tablet  twice a day      olmesartan (BENICAR) 40 MG tablet Take 40 mg by mouth once daily.      omeprazole (PRILOSEC) 40 MG capsule Take 40 mg by mouth once daily.      OZEMPIC 1 mg/dose (4 mg/3 mL) Inject 1 mL into the skin.      spironolactone (ALDACTONE) 25 MG tablet Take 25 mg by mouth once daily.      SYNTHROID 125 mcg tablet Take 125 mcg by mouth.      tamsulosin (FLOMAX) 0.4 mg Cap Take 1 capsule (0.4 mg total) by mouth every evening.  Qty: 90 capsule, Refills: 3           Discharge Procedure Orders   Call MD for:  temperature >100.4     No dressing needed     Activity as tolerated     Procedure Order to Urology   Standing Status: Future Standing Exp. Date: 07/10/25   Order Comments: Urgent, with clot evac     Order Specific Question Answer Comments   Procedure TURP    Facility Name: [place]       Follow-up Information       Lise Parra MD Follow up on 2024.    Specialty: Urology  Why: TURP  Contact information:  [address] [place] [address] [address]  [address]  [place] [address]  [phone]                               Lise Parra MD  Urology Department

## 2024-07-10 NOTE — OP NOTE
[facility]  Operative Note    Date: 07/10/2024    Pre-Op Diagnosis:   Gross hematuria   BPH with nocturia     Post-Op Diagnosis: same    Procedure(s) Performed:   1.  Cystoscopy   2.  Irrigation of bladder     Specimen(s): none    Staff Surgeon: Lise Parra MD    Anesthesia: Local anesthesia     Indications: Srinivasa White is a 74 y.o. male with hx of gross hematuria and BPH.    Findings:   - large organized clot present within the bladder  - a stern was placed and a large portion of this was irrigated out, however a small amount of clot remained  - no bladder mucosal abnormalities were noted  - persistent active bleeding was seen from the prostate     Estimated Blood Loss: min    Procedure in Detail:  After risks, benefits and possible complications of cystoscopy were explained, the patient elected to undergo the procedure and informed consent was obtained. All questions were answered in the beto-operative area. The patient was transferred to the cystoscopy suite and placed in the dorsal lithotomy position and prepped and draped in the usual sterile fashion.  Time out was performed.     A flexible cystoscope was introduced into the bladder per urethra. This passed easily.  The entire urethra was visualized which showed no masses or strictures.  There was a large organized clot present within the bladder.  I was unable to visualize the bladder mucosa around the clot.  The scope was removed and a 24 Urdu Stern catheter was placed.  The bladder was then irrigated with normal saline and a significant amount of clot was removed.  The scope was then replaced and at this point we could see a small amount of clot remaining and there were no significant bladder mucosal abnormalities noted.  There was still significant active bleeding coming from the prostatic urethra.    The patient tolerated the procedure well and was transferred to recovery in stable condition.    Disposition:  Given  persistent clot and persistent active bleeding I recommended that we proceed with channel TURP to remove remaining clot and obtain hemostasis. Hopefully this will prevent further episodes of gross hematuria. Scheduled for 7/12/24. Restart Finasteride and will place on Bactrim through procedure.     Lise Parra MD

## 2024-07-10 NOTE — PROGRESS NOTES
Procedure Order to Urology [[id number]]    Electronically signed by: Lise Parra MD on 07/10/24 1040 Status: Active   Ordering user: Lise Parra MD 07/10/24 1040 Ordering provider: Lise Parra MD   Authorized by: Lise Parra MD Ordering mode: Standard   Frequency:  07/10/24 -     Diagnoses  Gross hematuria [R31.0]   Questionnaire    Question Answer   Procedure TURP Comment - 7/12   Facility Name: [facility]   Order comments: Urgent, with clot evac   Please order,outpatient hospital,  CBC, BMP A/A and culture , Ekg if over 40 , IV start, NPO , General anesthesia, Ancef 2 grams, ( alternative for pcn allergy is Cipro 400mg IV ) [id number]

## 2024-07-11 ENCOUNTER — ANESTHESIA EVENT (OUTPATIENT)
Dept: SURGERY | Facility: HOSPITAL | Age: 74
End: 2024-07-11
Payer: MEDICARE

## 2024-07-11 VITALS
BODY MASS INDEX: 39.12 KG/M2 | HEIGHT: 69 IN | SYSTOLIC BLOOD PRESSURE: 133 MMHG | WEIGHT: 264.13 LBS | HEART RATE: 89 BPM | DIASTOLIC BLOOD PRESSURE: 65 MMHG | RESPIRATION RATE: 18 BRPM | OXYGEN SATURATION: 96 % | TEMPERATURE: 98 F

## 2024-07-12 ENCOUNTER — HOSPITAL ENCOUNTER (OUTPATIENT)
Facility: HOSPITAL | Age: 74
Discharge: HOME | End: 2024-07-12
Attending: STUDENT IN AN ORGANIZED HEALTH CARE EDUCATION/TRAINING PROGRAM | Admitting: STUDENT IN AN ORGANIZED HEALTH CARE EDUCATION/TRAINING PROGRAM
Payer: MEDICARE

## 2024-07-12 ENCOUNTER — ANESTHESIA (OUTPATIENT)
Dept: SURGERY | Facility: HOSPITAL | Age: 74
End: 2024-07-12
Payer: MEDICARE

## 2024-07-12 VITALS
TEMPERATURE: 98 F | OXYGEN SATURATION: 96 % | HEART RATE: 75 BPM | HEIGHT: 69 IN | DIASTOLIC BLOOD PRESSURE: 63 MMHG | RESPIRATION RATE: 14 BRPM | SYSTOLIC BLOOD PRESSURE: 126 MMHG | WEIGHT: 260 LBS | BODY MASS INDEX: 38.51 KG/M2

## 2024-07-12 DIAGNOSIS — N40.0 BPH WITHOUT OBSTRUCTION/LOWER URINARY TRACT SYMPTOMS: Primary | ICD-10-CM

## 2024-07-12 DIAGNOSIS — Z01.818 PREOP TESTING: ICD-10-CM

## 2024-07-12 PROCEDURE — 71000015 HC POSTOP RECOV 1ST HR: Performed by: STUDENT IN AN ORGANIZED HEALTH CARE EDUCATION/TRAINING PROGRAM

## 2024-07-12 PROCEDURE — 94799 UNLISTED PULMONARY SVC/PX: CPT

## 2024-07-12 PROCEDURE — 71000033 HC RECOVERY, INTIAL HOUR: Performed by: STUDENT IN AN ORGANIZED HEALTH CARE EDUCATION/TRAINING PROGRAM

## 2024-07-12 PROCEDURE — 63600175 PHARM REV CODE 636 W HCPCS: Performed by: ANESTHESIOLOGY

## 2024-07-12 PROCEDURE — 25000003 PHARM REV CODE 250: Performed by: ANESTHESIOLOGY

## 2024-07-12 PROCEDURE — 88305 TISSUE EXAM BY PATHOLOGIST: CPT | Mod: TC | Performed by: PATHOLOGY

## 2024-07-12 PROCEDURE — 37000008 HC ANESTHESIA 1ST 15 MINUTES: Performed by: STUDENT IN AN ORGANIZED HEALTH CARE EDUCATION/TRAINING PROGRAM

## 2024-07-12 PROCEDURE — 27200651 HC AIRWAY, LMA: Performed by: STUDENT IN AN ORGANIZED HEALTH CARE EDUCATION/TRAINING PROGRAM

## 2024-07-12 PROCEDURE — 63600175 PHARM REV CODE 636 W HCPCS: Performed by: STUDENT IN AN ORGANIZED HEALTH CARE EDUCATION/TRAINING PROGRAM

## 2024-07-12 PROCEDURE — 71000039 HC RECOVERY, EACH ADD'L HOUR: Performed by: STUDENT IN AN ORGANIZED HEALTH CARE EDUCATION/TRAINING PROGRAM

## 2024-07-12 PROCEDURE — 25000003 PHARM REV CODE 250: Performed by: STUDENT IN AN ORGANIZED HEALTH CARE EDUCATION/TRAINING PROGRAM

## 2024-07-12 PROCEDURE — 36000707: Performed by: STUDENT IN AN ORGANIZED HEALTH CARE EDUCATION/TRAINING PROGRAM

## 2024-07-12 PROCEDURE — 93010 ELECTROCARDIOGRAM REPORT: CPT | Mod: ,,, | Performed by: GENERAL PRACTICE

## 2024-07-12 PROCEDURE — 27201423 OPTIME MED/SURG SUP & DEVICES STERILE SUPPLY: Performed by: STUDENT IN AN ORGANIZED HEALTH CARE EDUCATION/TRAINING PROGRAM

## 2024-07-12 PROCEDURE — 71000016 HC POSTOP RECOV ADDL HR: Performed by: STUDENT IN AN ORGANIZED HEALTH CARE EDUCATION/TRAINING PROGRAM

## 2024-07-12 PROCEDURE — 93005 ELECTROCARDIOGRAM TRACING: CPT

## 2024-07-12 PROCEDURE — 36000706: Performed by: STUDENT IN AN ORGANIZED HEALTH CARE EDUCATION/TRAINING PROGRAM

## 2024-07-12 PROCEDURE — 52601 PROSTATECTOMY (TURP): CPT | Mod: ,,, | Performed by: STUDENT IN AN ORGANIZED HEALTH CARE EDUCATION/TRAINING PROGRAM

## 2024-07-12 PROCEDURE — 37000009 HC ANESTHESIA EA ADD 15 MINS: Performed by: STUDENT IN AN ORGANIZED HEALTH CARE EDUCATION/TRAINING PROGRAM

## 2024-07-12 RX ORDER — EPHEDRINE SULFATE 50 MG/ML
INJECTION, SOLUTION INTRAVENOUS
Status: DISCONTINUED | OUTPATIENT
Start: 2024-07-12 | End: 2024-07-12

## 2024-07-12 RX ORDER — ACETAMINOPHEN 10 MG/ML
INJECTION, SOLUTION INTRAVENOUS
Status: DISCONTINUED | OUTPATIENT
Start: 2024-07-12 | End: 2024-07-12

## 2024-07-12 RX ORDER — LIDOCAINE HYDROCHLORIDE 10 MG/ML
1 INJECTION, SOLUTION EPIDURAL; INFILTRATION; INTRACAUDAL; PERINEURAL ONCE
Status: DISCONTINUED | OUTPATIENT
Start: 2024-07-12 | End: 2024-07-12 | Stop reason: HOSPADM

## 2024-07-12 RX ORDER — FENTANYL CITRATE 50 UG/ML
25 INJECTION, SOLUTION INTRAMUSCULAR; INTRAVENOUS EVERY 5 MIN PRN
Status: DISCONTINUED | OUTPATIENT
Start: 2024-07-12 | End: 2024-07-12 | Stop reason: HOSPADM

## 2024-07-12 RX ORDER — OXYCODONE HYDROCHLORIDE 5 MG/1
5 TABLET ORAL
Status: DISCONTINUED | OUTPATIENT
Start: 2024-07-12 | End: 2024-07-12 | Stop reason: HOSPADM

## 2024-07-12 RX ORDER — PROPOFOL 10 MG/ML
VIAL (ML) INTRAVENOUS
Status: DISCONTINUED | OUTPATIENT
Start: 2024-07-12 | End: 2024-07-12

## 2024-07-12 RX ORDER — GLUCAGON 1 MG
1 KIT INJECTION
Status: DISCONTINUED | OUTPATIENT
Start: 2024-07-12 | End: 2024-07-12 | Stop reason: HOSPADM

## 2024-07-12 RX ORDER — CIPROFLOXACIN 2 MG/ML
400 INJECTION, SOLUTION INTRAVENOUS
Status: COMPLETED | OUTPATIENT
Start: 2024-07-12 | End: 2024-07-12

## 2024-07-12 RX ORDER — SODIUM CHLORIDE 0.9 % (FLUSH) 0.9 %
3 SYRINGE (ML) INJECTION
Status: DISCONTINUED | OUTPATIENT
Start: 2024-07-12 | End: 2024-07-12 | Stop reason: HOSPADM

## 2024-07-12 RX ORDER — HYDROCODONE BITARTRATE AND ACETAMINOPHEN 5; 325 MG/1; MG/1
1 TABLET ORAL EVERY 4 HOURS PRN
Status: DISCONTINUED | OUTPATIENT
Start: 2024-07-12 | End: 2024-07-12 | Stop reason: HOSPADM

## 2024-07-12 RX ORDER — ONDANSETRON HYDROCHLORIDE 2 MG/ML
4 INJECTION, SOLUTION INTRAVENOUS DAILY PRN
Status: DISCONTINUED | OUTPATIENT
Start: 2024-07-12 | End: 2024-07-12 | Stop reason: HOSPADM

## 2024-07-12 RX ORDER — OXYCODONE AND ACETAMINOPHEN 5; 325 MG/1; MG/1
1 TABLET ORAL EVERY 4 HOURS PRN
Qty: 10 TABLET | Refills: 0 | Status: SHIPPED | OUTPATIENT
Start: 2024-07-12

## 2024-07-12 RX ORDER — ONDANSETRON 4 MG/1
8 TABLET, ORALLY DISINTEGRATING ORAL EVERY 8 HOURS PRN
Status: DISCONTINUED | OUTPATIENT
Start: 2024-07-12 | End: 2024-07-12 | Stop reason: HOSPADM

## 2024-07-12 RX ORDER — PHENAZOPYRIDINE HYDROCHLORIDE 100 MG/1
100 TABLET, FILM COATED ORAL 3 TIMES DAILY PRN
Qty: 30 TABLET | Refills: 0 | Status: SHIPPED | OUTPATIENT
Start: 2024-07-12 | End: 2024-07-22

## 2024-07-12 RX ORDER — FENTANYL CITRATE 50 UG/ML
INJECTION, SOLUTION INTRAMUSCULAR; INTRAVENOUS
Status: DISCONTINUED | OUTPATIENT
Start: 2024-07-12 | End: 2024-07-12

## 2024-07-12 RX ORDER — LIDOCAINE HYDROCHLORIDE 20 MG/ML
INJECTION INTRAVENOUS
Status: DISCONTINUED | OUTPATIENT
Start: 2024-07-12 | End: 2024-07-12

## 2024-07-12 RX ORDER — HYOSCYAMINE SULFATE 0.125 MG
125 TABLET ORAL EVERY 4 HOURS PRN
Qty: 30 TABLET | Refills: 0 | Status: SHIPPED | OUTPATIENT
Start: 2024-07-12 | End: 2024-08-11

## 2024-07-12 RX ORDER — ONDANSETRON HYDROCHLORIDE 2 MG/ML
INJECTION, SOLUTION INTRAVENOUS
Status: DISCONTINUED | OUTPATIENT
Start: 2024-07-12 | End: 2024-07-12

## 2024-07-12 RX ORDER — DEXAMETHASONE SODIUM PHOSPHATE 4 MG/ML
INJECTION, SOLUTION INTRA-ARTICULAR; INTRALESIONAL; INTRAMUSCULAR; INTRAVENOUS; SOFT TISSUE
Status: DISCONTINUED | OUTPATIENT
Start: 2024-07-12 | End: 2024-07-12

## 2024-07-12 RX ADMIN — FENTANYL CITRATE 25 MCG: 50 INJECTION, SOLUTION INTRAMUSCULAR; INTRAVENOUS at 12:07

## 2024-07-12 RX ADMIN — EPHEDRINE SULFATE 10 MG: 50 INJECTION, SOLUTION INTRAMUSCULAR; INTRAVENOUS; SUBCUTANEOUS at 11:07

## 2024-07-12 RX ADMIN — SODIUM CHLORIDE, SODIUM GLUCONATE, SODIUM ACETATE, POTASSIUM CHLORIDE AND MAGNESIUM CHLORIDE: 526; 502; 368; 37; 30 INJECTION, SOLUTION INTRAVENOUS at 09:07

## 2024-07-12 RX ADMIN — GLYCOPYRROLATE 0.2 MG: 0.2 INJECTION, SOLUTION INTRAMUSCULAR; INTRAVITREAL at 11:07

## 2024-07-12 RX ADMIN — OXYCODONE 5 MG: 5 TABLET ORAL at 12:07

## 2024-07-12 RX ADMIN — FENTANYL CITRATE 25 MCG: 50 INJECTION, SOLUTION INTRAMUSCULAR; INTRAVENOUS at 11:07

## 2024-07-12 RX ADMIN — CIPROFLOXACIN 400 MG: 2 INJECTION INTRAVENOUS at 11:07

## 2024-07-12 RX ADMIN — FENTANYL CITRATE 50 MCG: 50 INJECTION, SOLUTION INTRAMUSCULAR; INTRAVENOUS at 11:07

## 2024-07-12 RX ADMIN — ACETAMINOPHEN 1000 MG: 10 INJECTION, SOLUTION INTRAVENOUS at 11:07

## 2024-07-12 RX ADMIN — PROPOFOL 200 MG: 10 INJECTION, EMULSION INTRAVENOUS at 10:07

## 2024-07-12 RX ADMIN — SODIUM CHLORIDE, SODIUM GLUCONATE, SODIUM ACETATE, POTASSIUM CHLORIDE AND MAGNESIUM CHLORIDE: 526; 502; 368; 37; 30 INJECTION, SOLUTION INTRAVENOUS at 11:07

## 2024-07-12 RX ADMIN — ONDANSETRON 4 MG: 2 INJECTION INTRAMUSCULAR; INTRAVENOUS at 10:07

## 2024-07-12 RX ADMIN — LIDOCAINE HYDROCHLORIDE 100 MG: 20 INJECTION, SOLUTION INTRAVENOUS at 10:07

## 2024-07-12 RX ADMIN — DEXAMETHASONE SODIUM PHOSPHATE 4 MG: 4 INJECTION, SOLUTION INTRA-ARTICULAR; INTRALESIONAL; INTRAMUSCULAR; INTRAVENOUS; SOFT TISSUE at 10:07

## 2024-07-12 NOTE — PLAN OF CARE
Patient and spouse given discharge instructions. Educated on post-anesthesia precautions, catheter care and when to notify MD. Instructed when to follow up. Peripheral IV discontinued with catheter intact. Transferred to car via wheelchair and left with spouse to home.

## 2024-07-12 NOTE — INTERVAL H&P NOTE
The patient has been examined and the H&P has been reviewed:    I concur with the findings and no changes have occurred since H&P was written.    Surgery risks, benefits and alternative options discussed and understood by patient/family.    To OR for clot evac and TURP to control hematuria.       There are no hospital problems to display for this patient.

## 2024-07-12 NOTE — ANESTHESIA PROCEDURE NOTES
Intubation    Date/Time: 7/12/2024 10:56 AM    Performed by: Aparna Girard CRNA  Authorized by: Azeem Calix MD    Intubation:     Induction:  Intravenous    Mask Ventilation:  Not attempted    Attempts:  1    Attempted By:  CRNA    Difficult Airway Encountered?: No      Complications:  None    Airway Device:  Supraglottic airway/LMA    Airway Device Size:  4.0    Secured at:  The lips    Placement Verified By:  Capnometry    Complicating Factors:  None    Findings Post-Intubation:  BS equal bilateral and atraumatic/condition of teeth unchanged  Notes:      Large beard

## 2024-07-12 NOTE — OP NOTE
[facility]  Operative Note    Date: 07/12/2024    Pre-Op Diagnosis:   - Gross hematuria   - BPH with bladder outlet obstruction    Post-Op Diagnosis: same    Procedure(s) Performed:   TURP  Clot evacuation     Specimen(s): prostate chips    Staff Surgeon: Lise Parra MD    Anesthesia: General LMA anesthesia    Indications: Srinivasa White is a 74 y.o. male with recurrent gross hematuria with hx of BPH.     Findings:   - organized clot removed from bladder with elik evacuator   - multiple areas of friable bleeding tissue noted mainly around the bladder neck and anteriorly  - Channel turp performed to remove friable tissue and obtain hemostasis     Estimated Blood Loss: min    Drains: 24 Fr stern catheter    Procedure in detail:  After the risks and benefits of the procedure were explained, consent was obtained, and the patient was taken to the operating suite and placed in the supine position.  Anesthesia was administered.  When adequately sedated, the patient was placed in dorsal lithotomy position and prepped and draped in normal sterile fashion.  Timeout was performed and preoperative ABx were confirmed.      A 28-Burundian sheath resectoscope was placed into the bladder using a visual obturator. There was a large organized clot seen. This was able to be removed with the Ellik evacuator. The visual obturator was exchanged for the resecting mechanism. The ureteral orifices were identified. There was friable bleeding tissue noted at the bladder neck and anteriorly. A channel turp was performed in order to remove this tissue and obtain hemostasis. Hemostasis was then confirmed.    The ureteral orifices, verumontanum and sphincter were intact. The Ellik evacuator was used to remove all prostate chips and again hemostasis was obtained.     Once the bladder was drained, we could see that he had an open channel and the scope was removed.  A 24 Fr catheter was placed in the bladder with 50 mL of  water in the balloon. The patient was placed on traction. The patient was transferred to recovery in stable condition.     Disposition: The patient will remain on traction for 30 mins. If urine is clear he will be discharged home. Follow up on 7/16 for stern removal.    Lise Parra MD

## 2024-07-12 NOTE — TRANSFER OF CARE
"Anesthesia Transfer of Care Note    Patient: Srinivasa White    Procedure(s) Performed: Procedure(s) (LRB):  TURP (TRANSURETHRAL RESECTION OF PROSTATE) (N/A)    Patient location: PACU    Anesthesia Type: general    Transport from OR: Transported from OR on 2-3 L/min O2 by NC with adequate spontaneous ventilation    Post pain: adequate analgesia    Post assessment: no apparent anesthetic complications    Post vital signs: stable    Level of consciousness: responds to stimulation and lethargic    Nausea/Vomiting: no nausea/vomiting    Complications: none    Transfer of care protocol was followed      Last vitals: Visit Vitals  BP (!) 116/57 (BP Location: Left arm, Patient Position: Lying)   Pulse 68   Temp 36.7 °C (98.1 °F) (Skin)   Resp 16   Ht 5' 9" (1.753 m)   Wt 117.9 kg (260 lb)   SpO2 98%   BMI 38.40 kg/m²     "

## 2024-07-12 NOTE — DISCHARGE INSTRUCTIONS
Post Cystoscopy and Transurethral resection of Prostate Instructions  Do not strain to have a bowel movement  No strenuous exercise x 7 days  No driving while you are on narcotic pain medications or if your stern  catheter is in place    You can expect:  To see blood in your urine.    Go to the ER if:  Your catheter stops draining  You are having severe abdominal pain  Inability to void if you do not have a catheter        Using an Incentive Spirometer    An incentive spirometer is a device that helps you do deep breathing exercises. These exercises expand your lungs, aid in circulation, and help prevent pneumonia. Deep breathing exercises also help you breathe better and improve the function of your lungs by:  Keeping your lungs clear  Strengthening your breathing muscles  Helping prevent respiratory complications or problems  The incentive spirometer gives you a way to take an active part in recover. A nurse or therapist will teach you breathing exercises. To do these exercises, you will breathe in through your mouth and not your nose. The incentive spirometer only works correctly if you breathe in through your mouth.  Steps to clear lungs  Step 1. Exhale normally. Then, inhale normally.  Relax and breathe out.  Step 2. Place your lips tightly around the mouthpiece.  Make sure the device is upright and not tilted.  Step 3. Inhale as much air as you can through the mouthpiece (don't breath through your nose).  Inhale slowly and deeply.  Hold your breath long enough to keep the balls or disk raised for at least 3 to 5 seconds, or as instructed by your healthcare provider.  Some spirometers have an indicator to let you know that you are breathing in too fast. If the indicator goes off, breathe in more slowly.  Step 4. Repeat the exercise regularly.  Do this exercise every hour while you're awake, or as instructed by your healthcare provider.  If you were taught deep breathing and coughing exercises, do them regularly  as instructed by your healthcare provider.             Post op instructions for prevention of DVT  What is deep vein thrombosis?  Deep vein thrombosis (DVT) is the medical term for blood clots in the deep veins of the leg.  These blood clots can be dangerous.  A DVT can block a blood vessel and keep blood from getting where it needs to go.  Another problem is that the clot can travel to other parts of the body such as the lungs.  A clot that travels to the lungs is called a pulmonary embolus (PE) and can cause serious problems with breathing which can lead to death.  Am I at risk for DVT/PE?  If you are not very active, you are at risk of DVT.  Anyone confined to bed, sitting for long periods of time, recovering from surgery, etc. increases the risk of DVT.  Other risk factors are cancer diagnosis, certain medications, estrogen replacement in any form,older age, obesity, pregnancy, smoking, history of clotting disorders, and dehydration.  How will I know if I have a DVT?  Swelling in the lower leg  Pain  Warmth, redness, hardness or bulging of the vein  If you have any of these symptoms, call your doctors office right away.  Some people will not have any symptoms until the clot moves to the lungs.  What are the symptoms of a PE?  Panting, shortness of breath, or trouble breathing  Sharp, knife-like chest pain when you breathe  Coughing or coughing up blood  Rapid heartbeat  If you have any of these symptoms or get worse quickly, call 911 for emergency treatment.  How can I prevent a DVT?  Avoid long periods of inactivity and dont cross your legs--get up and walk around every hour or so.  Stay active--walking after surgery is highly encouraged.  This means you should get out of the house and walk in the neighborhood.  Going up and down stairs will not impair healing (unless advised against such activity by your doctor).    Drink plenty of noncaffeinated, nonalcoholic fluids each day to prevent dehydration.  Wear  special support stockings, if they have been advised by your doctor.  If you travel, stop at least once an hour and walk around.  Avoid smoking (assistance with stopping is available through your healthcare provider)  Always notify your doctor if you are not able to follow the post operative instructions that are given to you at the time of discharge.  It may be necessary to prescribe one of the medications available to prevent DVT.    Call the doctor if:  Temperature is greater than 101 F  Persistent vomiting and inability to keep food down

## 2024-07-12 NOTE — DISCHARGE SUMMARY
[facility]  Discharge Note  Short Stay    Admit Date: 7/12/2024    Discharge Date and Time: 07/12/2024 11:56 AM      Attending Physician: Lise Parra MD     Discharge Provider: Lise Parra    Diagnoses:  Active Hospital Problems    Diagnosis  POA    *Gross hematuria [R31.0]  Yes      Resolved Hospital Problems   No resolved problems to display.       Discharged Condition: good    Hospital Course: Patient was admitted for outpatient procedure and tolerated the procedure well with no complications. The patient was discharged home in good condition on the same day.       Final Diagnoses: Same as principal problem.    Disposition: Home or Self Care    Follow up/Patient Instructions:    Medications:  Reconciled Home Medications:   Current Discharge Medication List        START taking these medications    Details   hyoscyamine (ANASPAZ,LEVSIN) 0.125 mg Tab Take 1 tablet (125 mcg total) by mouth every 4 (four) hours as needed.  Qty: 30 tablet, Refills: 0      oxyCODONE-acetaminophen (PERCOCET) 5-325 mg per tablet Take 1 tablet by mouth every 4 (four) hours as needed for Pain.  Qty: 10 tablet, Refills: 0    Comments: n/a       phenazopyridine (PYRIDIUM) 100 MG tablet Take 1 tablet (100 mg total) by mouth 3 (three) times daily as needed.  Qty: 30 tablet, Refills: 0           CONTINUE these medications which have NOT CHANGED    Details   atorvastatin (LIPITOR) 20 MG tablet Take 20 mg by mouth once daily.      felodipine (PLENDIL) 5 MG 24 hr tablet Take 5 mg by mouth once daily.      finasteride (PROSCAR) 5 mg tablet Take 1 tablet (5 mg total) by mouth once daily.  Qty: 30 tablet, Refills: 11      glucosamine lew 2KCl-chondroit 500-400 mg Tab Take 1 tablet by mouth.      glucosamine-chondroitin 500-400 mg tablet Take 1 tablet by mouth 2 (two) times a day. 1500mg/MSM 1500mg      indapamide (LOZOL) 2.5 MG Tab Take 2.5 mg by mouth once daily.      Lactobacillus rhamnosus GG (CULTURELLE) 10 billion cell  capsule Take 1 capsule by mouth once daily.      levocetirizine (XYZAL) 5 MG tablet Take 5 mg by mouth.      olmesartan (BENICAR) 40 MG tablet Take 40 mg by mouth once daily.      omeprazole (PRILOSEC) 40 MG capsule Take 40 mg by mouth once daily.      spironolactone (ALDACTONE) 25 MG tablet Take 25 mg by mouth once daily.      sulfamethoxazole-trimethoprim 800-160mg (BACTRIM DS) 800-160 mg Tab Take 1 tablet by mouth 2 (two) times daily. for 3 days  Qty: 6 tablet, Refills: 0      SYNTHROID 125 mcg tablet Take 125 mcg by mouth.      tamsulosin (FLOMAX) 0.4 mg Cap Take 1 capsule (0.4 mg total) by mouth every evening.  Qty: 90 capsule, Refills: 3      ACCU-CHEK GUIDE GLUCOSE METER Misc       ACCU-CHEK GUIDE TEST STRIPS Strp       ACCU-CHEK SOFTCLIX LANCETS Misc Apply topically.      acetaminophen (TYLENOL) 500 MG tablet Take 2 tablets by mouth every 8 (eight) hours as needed.      ALCOHOL PREP PADS PadM SMARTSIG:Each Topical      aspirin (ECOTRIN) 81 MG EC tablet Take 81 mg by mouth once daily.      azithromycin (Z-KAYLEN) 250 MG tablet Take 250 mg by mouth.      b complex vitamins capsule Take 1 capsule by mouth once daily.      benzonatate (TESSALON) 100 MG capsule Take 100 mg by mouth 3 (three) times daily as needed.      calcium citrate malate-vit D3 (OSCAL) 250 mg-2.5 mcg (100 unit) Tab Take 1 tablet by mouth.      ciclopirox (LOPROX) 0.77 % Crea Apply 1 application topically daily as needed.      ciclopirox (PENLAC) 8 % Soln Apply topically.      COCONUT OIL ORAL Take 1 capsule by mouth once daily.      fluorouraciL (EFUDEX) 5 % cream SMARTSI sparingly Topical Every Night      inulin (FIBER GUMMIES ORAL) Take 1 capsule by mouth once daily.      magnesium citrate 100 mg Cap Take 1 capsule by mouth.      magnesium oxide 500 mg Tab 1 tablet  twice a day      OZEMPIC 1 mg/dose (4 mg/3 mL) Inject 1 mL into the skin.           Discharge Procedure Orders   Diet general     Call MD for:  temperature >100.4     Call MD  for:  persistent nausea and vomiting     Call MD for:  severe uncontrolled pain     No dressing needed     Activity as tolerated      Follow-up Information       Lise Parra MD Follow up on 8/26/2024.    Specialty: Urology  Why: assess symptoms  Contact information:  [address] [place] [address] [address]IVE  [address]  [facility] [id number] [phone]                               Lise Parra MD  Urology Department

## 2024-07-12 NOTE — ANESTHESIA POSTPROCEDURE EVALUATION
Anesthesia Post Evaluation    Patient: Srinivasa White    Procedure(s) Performed: Procedure(s) (LRB):  TURP (TRANSURETHRAL RESECTION OF PROSTATE) (N/A)    Final Anesthesia Type: general      Patient location during evaluation: PACU  Patient participation: Yes- Able to Participate  Level of consciousness: awake and alert  Post-procedure vital signs: reviewed and stable  Pain management: adequate  Airway patency: patent    PONV status at discharge: No PONV  Anesthetic complications: no      Cardiovascular status: hemodynamically stable  Respiratory status: unassisted and room air  Hydration status: euvolemic  Follow-up not needed.              Vitals Value Taken Time   /63 07/12/24 1301   Temp 36.4 °C (97.5 °F) 07/12/24 1255   Pulse 75 07/12/24 1301   Resp 14 07/12/24 1301   SpO2 96 % 07/12/24 1301         Event Time   Out of Recovery 13:01:00         Pain/Miguelina Score: Pain Rating Prior to Med Admin: 6 (7/12/2024 12:45 PM)  Miguelina Score: 10 (7/12/2024  1:01 PM)

## 2024-07-16 ENCOUNTER — TELEPHONE (OUTPATIENT)
Dept: UROLOGY | Facility: CLINIC | Age: 74
End: 2024-07-16

## 2024-07-16 ENCOUNTER — CLINICAL SUPPORT (OUTPATIENT)
Dept: UROLOGY | Facility: CLINIC | Age: 74
End: 2024-07-16
Payer: MEDICARE

## 2024-07-16 DIAGNOSIS — N40.0 BPH WITHOUT OBSTRUCTION/LOWER URINARY TRACT SYMPTOMS: Primary | ICD-10-CM

## 2024-07-16 PROCEDURE — 99999 PR PBB SHADOW E&M-EST. PATIENT-LVL I: CPT | Mod: PBBFAC,,,

## 2024-07-16 PROCEDURE — 99211 OFF/OP EST MAY X REQ PHY/QHP: CPT | Mod: PBBFAC,PO

## 2024-07-16 PROCEDURE — 99499 UNLISTED E&M SERVICE: CPT | Mod: S$PBB,,, | Performed by: STUDENT IN AN ORGANIZED HEALTH CARE EDUCATION/TRAINING PROGRAM

## 2024-07-16 NOTE — PROGRESS NOTES
Per Dr. Parra patient arrived in office today for catheter removal.  30ml of sterile water removed from catheter.  24F stern catheter removed with no difficulty.  Catheter bag draining 100ml of yellowish orange urine.  STAT lock removed from left leg.  Patient left office in satisfactory condition.

## 2024-07-16 NOTE — TELEPHONE ENCOUNTER
Spoke with patient, advisement given to start back taking the aspirin, patient verbally understood.

## 2024-07-19 LAB
OHS QRS DURATION: 80 MS
OHS QTC CALCULATION: 440 MS

## 2024-08-22 NOTE — PROGRESS NOTES
[facility] Note    PCP: Yusuf Torres IV, MD    Chief Complaint: BPH    SUBJECTIVE:       History of Present Illness:  Srinivasa White is a 74 y.o. male who presents to clinic for BPH. He is Established  to our clinic.     S/P TURP on 7/12/24 for recurrent gross hematuria.   Pathology: 9 g, benign    No further gross hematuria.   Urinary symptoms are stable.     PVR 0 cc      7/20/22  Previous cysto showed enlargement of the prostate with bleeding noted. He was started on Finasteride and has continued Flomax.   CT and cytology were negative.     No further gross hematuria.   Nocturia x 2. Sleeps in a recliner.   No bothersome complaints during the day. He feels as though he empties well.     1/10/22  Patient had episode of gross hematuria approximately 6 months ago for which a IVP was ordered. This showed possible bilateral renal pelvis dilation, however it appears this may be extra renal pelvices and visualization is difficult secondary to his very large stool burden. Kidneys appear to drain well.     He is without complaints today. No recent hematuria. No dysuria.     UA today negative   PVR 0 cc    3/1/21  Previous patient of Dr. Orlando. Underwent laser TURP in 10/2015 and noted improvement in his symptoms following this.   Has some dribbling. Rarely has a weak stream. Feels like he empties well. No significant frequency or urgency. No incontinence.   No hematuria. Mild dysuria with orange juice.   Not on any bladder medications.     Also has hx of diabetes insipida for which he was being treated with Desmopressin by his nephrology however has stopped this. Since he got COVID in October, he has had to sleep in a recliner and since then has not had as much of an issue with nocturia.     Hx of stones many years ago.     UA today: negative for blood, leuks, nitrite   PVR today: 0 cc    Last urine culture: no documented UTIs  Last creatinine: 0.8 (9/21/18)  Last PSA: 0.69  "(1/29/20)    Family  hx: no malignancy   Hx of HTN, obesity    Past medical, family, and social history reviewed as documented in chart with pertinent positive medical, family, and social history detailed in HPI.    Review of patient's allergies indicates:   Allergen Reactions    [id number]      Per pt, causes blood in urine.        Past Medical History:   Diagnosis Date    Arthritis     Asthma     Back pain     BPH (benign prostatic hyperplasia)     CHF (congestive heart failure) 2021    Digestive disorder     H/O colonoscopy 01/01/2005    Polyps removed    Hypertension     Thyroid disease      Past Surgical History:   Procedure Laterality Date    APPENDECTOMY  01/01/1974    COLONOSCOPY      CYSTOSCOPY N/A 7/10/2024    Procedure: CYSTOSCOPY;  Surgeon: Lise Parra MD;  Location: [facility] OR;  Service: Urology;  Laterality: N/A;    LASER OF PROSTATE W/ GREEN LIGHT PVP  10/20/2015    left leg Schrap medat  01/01/1970    SKIN BIOPSY      THYROIDECTOMY N/A 5/23/2023    Procedure: THYROIDECTOMY;  Surgeon: Pat High MD;  Location: [facility] OR;  Service: General;  Laterality: N/A;    TONSILLECTOMY  01/01/1958    TRANSURETHRAL RESECTION OF PROSTATE N/A 7/12/2024    Procedure: TURP (TRANSURETHRAL RESECTION OF PROSTATE);  Surgeon: Lise Parra MD;  Location: [facility] OR;  Service: Urology;  Laterality: N/A;  CLOT EVAC    WISDOM TOOTH EXTRACTION  01/01/1964     No family history on file.  Social History     Tobacco Use    Smoking status: Former     Types: Cigarettes    Smokeless tobacco: Never   Substance Use Topics    Alcohol use: Yes     Alcohol/week: 0.0 standard drinks of alcohol     Comment: occasional beer or wine monthly    Drug use: No        Review of Systems    OBJECTIVE:     Anticoagulation: aspirin 81 mg    Estimated body mass index is 38.4 kg/m² as calculated from the following:    Height as of 7/12/24: 5' 9" (1.753 m).    Weight as of 7/12/24: 117.9 kg (260 lb).    Vital Signs " (Most Recent)       Physical Exam  Constitutional:       General: He is not in acute distress.     Appearance: Normal appearance. He is obese. He is not ill-appearing.   HENT:      Head: Normocephalic and atraumatic.   Eyes:      General: No scleral icterus.  Cardiovascular:      Rate and Rhythm: Normal rate and regular rhythm.   Pulmonary:      Effort: Pulmonary effort is normal. No respiratory distress.   Abdominal:      General: There is no distension.   Skin:     Coloration: Skin is not jaundiced.   Neurological:      General: No focal deficit present.      Mental Status: He is alert and oriented to person, place, and time.   Psychiatric:         Mood and Affect: Mood normal.         Behavior: Behavior normal.         Thought Content: Thought content normal.         BMP  Lab Results   Component Value Date     05/24/2023    K 4.3 05/24/2023     05/24/2023    CO2 26 05/24/2023    BUN 20 05/24/2023    CREATININE 1.1 06/21/2024    CALCIUM 9.1 06/01/2023    ANIONGAP 8 05/24/2023    ESTGFRAFRICA >60 01/13/2022    EGFRNONAA >60 01/13/2022       Lab Results   Component Value Date    WBC 11.42 04/29/2023    HGB 13.0 (L) 04/29/2023    HCT 39.9 (L) 04/29/2023    MCV 87 04/29/2023     04/29/2023       Lab Results   Component Value Date    PSADIAG 0.69 01/29/2020    PSATOTAL 0.62 06/04/2018    PSAFREE 0.41 06/04/2018       Imaging:  CT abd/pelvis 10/5/18:  1. Left adrenal myelolipoma.  2. Splenic hypodense lesion, possibly a hemangioma but overall indeterminate.  3. Prostatomegaly.  Correlate with PSA.  4. Coronary artery disease.    ASSESSMENT     1. BPH without obstruction/lower urinary tract symptoms      PLAN:     - Continue Flomax and Finasteride   - Doing well with no complaints   - Will let me know if gross hematuria returns  - Follow up in 6 months     Lise Parra MD

## 2024-08-26 ENCOUNTER — OFFICE VISIT (OUTPATIENT)
Dept: UROLOGY | Facility: CLINIC | Age: 74
End: 2024-08-26
Payer: MEDICARE

## 2024-08-26 DIAGNOSIS — N40.0 BPH WITHOUT OBSTRUCTION/LOWER URINARY TRACT SYMPTOMS: Primary | ICD-10-CM

## 2024-08-26 PROCEDURE — 99999 PR PBB SHADOW E&M-EST. PATIENT-LVL III: CPT | Mod: PBBFAC,,, | Performed by: STUDENT IN AN ORGANIZED HEALTH CARE EDUCATION/TRAINING PROGRAM

## 2024-08-26 PROCEDURE — 99024 POSTOP FOLLOW-UP VISIT: CPT | Mod: POP,,, | Performed by: STUDENT IN AN ORGANIZED HEALTH CARE EDUCATION/TRAINING PROGRAM

## 2024-08-26 PROCEDURE — 99213 OFFICE O/P EST LOW 20 MIN: CPT | Mod: PBBFAC,PO | Performed by: STUDENT IN AN ORGANIZED HEALTH CARE EDUCATION/TRAINING PROGRAM

## 2024-12-12 ENCOUNTER — OFFICE VISIT (OUTPATIENT)
Dept: PULMONOLOGY | Facility: CLINIC | Age: 74
End: 2024-12-12
Payer: MEDICARE

## 2024-12-12 VITALS — WEIGHT: 278 LBS | BODY MASS INDEX: 41.05 KG/M2 | OXYGEN SATURATION: 97 % | HEART RATE: 73 BPM

## 2024-12-12 DIAGNOSIS — G47.33 OBSTRUCTIVE SLEEP APNEA SYNDROME: ICD-10-CM

## 2024-12-12 DIAGNOSIS — R06.02 SHORTNESS OF BREATH: Primary | ICD-10-CM

## 2024-12-12 DIAGNOSIS — E66.01 MORBID OBESITY: ICD-10-CM

## 2024-12-12 RX ORDER — EFINACONAZOLE 100 MG/ML
SOLUTION TOPICAL
COMMUNITY
Start: 2024-09-15

## 2024-12-12 NOTE — PROGRESS NOTES
[facility]  Pulmonology  Follow-Up Clinic Visit    Subjective   Reason for Referral:    Srinivasa White is a 74 y.o. male referred by Dr. Zamora for evaluation of shortness of breath.    Chief Complaint   Patient presents with    Follow-up     Follow up shortness of breath       10/4/2021:  Mr. Srinivasa White is a 71 year old gentleman with a history of CHF, HTN and prior COVID-19 who was in his usual state of health until several months ago when he developed exertional dyspnea.  3 months ago he was hospitalized with a CHF exacerbation.  His dypnea improved with IV diuretics and he was discharged home on oxygen.  He was referred her by his cardiologist;s NP.  He smoked 2 packs of cigarettes in the 1960's.  He is unaware of any intrinsic pulmonary disease.    He was in his usual state of health until 8 months ago, when he began experiencing dyspnea on exertion.  His symptoms were gradual in onset and stable since.  Symptoms occur while getting dressed.  Associated symptoms include lower extremity edema and orthopnea, but He denies any wheezing, sputum production, or pleurisy.  His symptoms are exacerbated by any exercise and improved with rest, oxygen and diuretics.   His weight has been stable, on diuretics.  His appetite has been unchanged.  The patient is having no constitutional symptoms, denying fever, chills, anorexia, or weight loss..  Work up so far has included CT chest, CXR and Echo.  Prior treatments include diuretics, which has provided some symptom releif.  He is currently prescribed lasix that he takes when he gains weight.  The patient reports adherence to this regimen.    He uses several pillows at night.   He currently is on oxygen at 2 L/min per nasal cannula..  He smoked 2 packs of cigarettes in his life.   He has (difficulty walking 50 yards on flat ground).  He has been hospitalized twice due to his breathing.    11/1/2021:  In pretty well since our last visit from a respiratory  "perspective.  He was seen by his nephrologist who switched him from furosemide to indepamide.  He has been taking this and has noticed that his edema is been better.  He had a 6 minute walk test which she completed without oxygen and did not desaturate.  Overall he is relatively free from respiratory symptoms at this time.  He did recently have a urinary tract infection the presented with some cystitis and flank pain.  He is currently taking ciprofloxacin for a total of 10 days.  His symptoms associated with his urinary tract infection improved over the weekend.    11/1/2022 - Here for follow up, Feels he is doing pretty well overall.  He has weaned himself off of O2.  Prior PFT showed mild restriction and normal DLCO.  He has not been tested for sleep apnea - he does snore but denies observed apneas.  He denies EDS.  He has some chronic nasal congestion.  He had Covid in 2020.      4/27/2023 - Here for follow up, he reports that over the last 3 weeks has noted increased exertional dyspnea and reports drops in sats to the 70's when walking.  Weight is up 13 # since last visit.  He has some cough with thick white secretions and intermittent wheezing.  No f,c,s, or CP.  His home sleep study showed mild STEPHANIE (DAMIEN - 8) and lowest sat 88%.    5/11/2023 - Here for follow up, PFT shows no obstruction, + mild restriction and mild reduction in DLCO but not significantly changed from 10/2021 other than FVC, FEV1 were improved.  Walk test was nonhypoxemic and showed good distance.  He feels the exertional dyspnea is better and wonders if he "had caught something".  No further drops in saturations noted at home.  He was in hospital overnight, CTA was poor quality study with no definite PE to my view.  Dopplers were negative and ECHO was normal with no pulmonary hypertension.    5/11/2023 - PFT (5/4/23)  - No obstruction  - Mild restriction (TLC 69%)  - Mild decreased DLCO  - since 10/2021 FVC and FEV1 are better  - nonhypoxemic " "walk test    6/8/2023 - Here for follow up, overall doing well from a pulmonary standpoint.  Breathing is better and is back to normal.  On 5/23 he had removal of goiter and is recovering.  He has been able to mow his lawn with his wife.    12/7/2023 - Here for follow up, has continued to recover from his thyroid surgery.  He is on thyroid replacement and needs further adjustment.  He does feel "low energy".    We discussed vaccine, he had flu shot, he is up to date on the pneumonia vaccine, he does not want to do the Covid vaccine and we discussed the RSV vaccine.    6/11/2024 - Here for follow up, no new respiratory issues at this time.  He is passing some blood in his urine and is to see a urologist about this.  SOB is about the same.  No problems with sleep at this time.  Has lost a signifcantamount of weight since his last visit (on OZEMPIC)    12/12/2024 - Here for follow, no breathing or respiratory issues reported. His urinary issues have been resolved (due to prostate).  His OZEMPIC was stopped due to GI issues and he has regained about 14# and is working on this.  His wife has been diagnosed with stage 4 appendiceal cancer and she is being treated.  He does not report any sleep issues at this time. He does sleep in a recliner.  There has been a lot of stress in his life right now and we discussed this.  We did discuss the need to care for himself and work on weight loss.  Hi SOb is about the same.       Past Medical History:   Diagnosis Date    Arthritis     Asthma     Back pain     BPH (benign prostatic hyperplasia)     CHF (congestive heart failure) 2021    Digestive disorder     H/O colonoscopy 01/01/2005    Polyps removed    Hypertension     Thyroid disease      Past Surgical History:   Procedure Laterality Date    APPENDECTOMY  01/01/1974    COLONOSCOPY      CYSTOSCOPY N/A 7/10/2024    Procedure: CYSTOSCOPY;  Surgeon: Lise Parra MD;  Location: [facility] OR;  Service: Urology;  Laterality: " N/A;    LASER OF PROSTATE W/ GREEN LIGHT PVP  10/20/2015    left leg Schrap medat  01/01/1970    SKIN BIOPSY      THYROIDECTOMY N/A 5/23/2023    Procedure: THYROIDECTOMY;  Surgeon: Pat High MD;  Location: [facility] OR;  Service: General;  Laterality: N/A;    TONSILLECTOMY  01/01/1958    TRANSURETHRAL RESECTION OF PROSTATE N/A 7/12/2024    Procedure: TURP (TRANSURETHRAL RESECTION OF PROSTATE);  Surgeon: Lise Parra MD;  Location: [facility] OR;  Service: Urology;  Laterality: N/A;  CLOT EVAC    WISDOM TOOTH EXTRACTION  01/01/1964     No family history on file.   Social History     Tobacco Use    Smoking status: Former     Types: Cigarettes    Smokeless tobacco: Never   Substance and Sexual Activity    Alcohol use: Yes     Alcohol/week: 0.0 standard drinks of alcohol     Comment: occasional beer or wine monthly    Drug use: No    Sexual activity: Not on file      Allergies:   [id number]     Outpatient Medications as of 12/12/2024   Medication    ACCU-CHEK GUIDE GLUCOSE METER Misc    ACCU-CHEK GUIDE TEST STRIPS Strp    ACCU-CHEK SOFTCLIX LANCETS Misc    acetaminophen (TYLENOL) 500 MG tablet    ALCOHOL PREP PADS PadM    aspirin (ECOTRIN) 81 MG EC tablet    atorvastatin (LIPITOR) 20 MG tablet    b complex vitamins capsule    benzonatate (TESSALON) 100 MG capsule    calcium citrate malate-vit D3 (OSCAL) 250 mg-2.5 mcg (100 unit) Tab    ciclopirox (LOPROX) 0.77 % Crea    ciclopirox (PENLAC) 8 % Soln    COCONUT OIL ORAL    felodipine (PLENDIL) 5 MG 24 hr tablet    fluorouraciL (EFUDEX) 5 % cream    indapamide (LOZOL) 2.5 MG Tab    inulin (FIBER GUMMIES ORAL)    JUBLIA 10 % Preeti    Lactobacillus rhamnosus GG (CULTURELLE) 10 billion cell capsule    levocetirizine (XYZAL) 5 MG tablet    magnesium citrate 100 mg Cap    magnesium oxide 500 mg Tab    olmesartan (BENICAR) 40 MG tablet    spironolactone (ALDACTONE) 25 MG tablet    SYNTHROID 125 mcg tablet    azithromycin (Z-KAYLEN) 250 MG tablet    finasteride  (PROSCAR) 5 mg tablet    glucosamine lew 2KCl-chondroit 500-400 mg Tab    glucosamine-chondroitin 500-400 mg tablet    hyoscyamine (ANASPAZ,LEVSIN) 0.125 mg Tab    omeprazole (PRILOSEC) 40 MG capsule    oxyCODONE-acetaminophen (PERCOCET) 5-325 mg per tablet    OZEMPIC 1 mg/dose (4 mg/3 mL)    tamsulosin (FLOMAX) 0.4 mg Cap     No current facility-administered medications on file as of 12/12/2024.      Review of Systems   Constitutional:  Positive for weight loss and fatigue. Negative for fever, chills and night sweats.   HENT:  Negative for postnasal drip, rhinorrhea, sinus pressure and congestion.    Eyes:  Negative for redness and itching.   Respiratory:  Positive for cough, shortness of breath, wheezing, orthopnea and dyspnea on extertion.    Cardiovascular:  Positive for leg swelling. Negative for chest pain and palpitations.   Genitourinary:  Negative for difficulty urinating and hematuria.   Endocrine:  Negative for polydipsia, polyphagia and polyuria.    Musculoskeletal:  Negative for gait problem, joint swelling and myalgias.   Skin:  Negative for rash.   Gastrointestinal:  Negative for nausea, vomiting, abdominal pain and acid reflux.   Neurological:  Negative for syncope, weakness and headaches.   Hematological:  Negative for adenopathy. Does not bruise/bleed easily and no excessive bruising.   Psychiatric/Behavioral:  Negative for confusion and sleep disturbance. The patient is not nervous/anxious.    All other systems reviewed and are negative.     Previous Reports Reviewed:   ER records, historical medical records, lab reports, nursing home notes, office notes, operative reports, radiology reports, referral letter/letters and x-ray reports   The following portions of the patient's history were reviewed and updated as appropriate: allergies, current medications, past family history, past medical history, past social history, past surgical history and problem list.    Objective:      BP (P) 130/78 (BP  Location: Left arm, Patient Position: Sitting)   Pulse 73   Wt 126.1 kg (278 lb)   SpO2 97%   BMI 41.05 kg/m²   Body mass index is 41.05 kg/m².     Physical Exam   Constitutional: He is oriented to person, place, and time. He appears well-developed and well-nourished. No distress.   HENT:   Head: Normocephalic.   Mouth/Throat: Oropharynx is clear and moist. Mallampati Score: III.   Neck: No thyromegaly present.   Cardiovascular: Normal rate, regular rhythm, normal heart sounds and intact distal pulses.   Pulmonary/Chest: Normal expansion, symmetric chest wall expansion and effort normal. He has no wheezes. He has no rhonchi. He has no rales.   Abdominal: Soft. Bowel sounds are normal. He exhibits no distension. There is no abdominal tenderness.   Musculoskeletal:         General: No tenderness, deformity or edema. Normal range of motion.      Cervical back: Normal range of motion and neck supple.   Lymphadenopathy: No supraclavicular adenopathy is present.     He has no cervical adenopathy.     He has no axillary adenopathy.   Neurological: He is alert and oriented to person, place, and time. No cranial nerve deficit.   Skin: Skin is warm and dry. No rash noted.   Psychiatric: He has a normal mood and affect.   Nursing note and vitals reviewed.     Personal Review of Relevant Diagnostic Studies:  I have personally reviewed and interpreted the following labs/studies/images.  6MWT:  10/21/2021:  Distance:  Predicted:  443.2 m  Actual:  426.72 m  SpO2:  Rest:  96% on RA  Exercise:  97% on RA.      PFTs:  10/21/2021:  FVC:  2.37 959%)  FEV1:  1.92 (64%)  FEV1/FVC:  81  T.91 (71%)  DLCO:  16.48 (67%)  My impression.  No obstruction.  Mild restriction.  Mild diffusion impairment.  2023  FVC - 82%  FEV1 - 89%  FEV1/FVC - 82%  TLC - 69%  DLCO - 65%  Moderate restriction, mild decrease DLCO  Nonhypoxemic walk test    CXR:  10/19/2021:  Blunting of the costophrenic angles, right greater than left are consistent  with small pleural effusions    BMP:  7/22/2021:  140 / 3.7 / 104 / 28 / 13 / 0.74 / 134  D-dimer:  <200 (normal)    NT-proBNP:  7/22/2021:  522    CTA chest:  2/19/2021:  My impression:  Small filling defect in a subsegmental artery that looks more like a flow void.  I am unconvinced that it represent a clot.  1.  Small focal peripheral filling defect in a subsegmental artery supplying the lateral segment of the right middle lobe likely reflects a chronic pulmonary embolism.  2.  The thoracic aorta is normal caliber with mild atherosclerosis    Assessment:       1. Shortness of breath    2. Obstructive sleep apnea syndrome    3. Morbid obesity                      Plan:     SOB is about the same  Continue to work on weight loss and exercise  HST showed mild disease and we will follow clinically  RTC 6 month          Orders Placed This Visit:  No orders of the defined types were placed in this encounter.          Jorgito Kent MD  [facility] Pulmonary/Critical Care  12/12/2024

## 2025-02-24 ENCOUNTER — OFFICE VISIT (OUTPATIENT)
Dept: UROLOGY | Facility: CLINIC | Age: 75
End: 2025-02-24
Payer: MEDICARE

## 2025-02-24 DIAGNOSIS — N40.0 BPH WITHOUT OBSTRUCTION/LOWER URINARY TRACT SYMPTOMS: Primary | ICD-10-CM

## 2025-02-24 LAB
BILIRUBIN, UA POC OHS: NEGATIVE
BLOOD, UA POC OHS: NEGATIVE
CLARITY, UA POC OHS: CLEAR
COLOR, UA POC OHS: YELLOW
GLUCOSE, UA POC OHS: NEGATIVE
KETONES, UA POC OHS: NEGATIVE
LEUKOCYTES, UA POC OHS: NEGATIVE
NITRITE, UA POC OHS: NEGATIVE
PH, UA POC OHS: 6
POC RESIDUAL URINE VOLUME: 0 ML (ref 0–100)
PROTEIN, UA POC OHS: NEGATIVE
SPECIFIC GRAVITY, UA POC OHS: 1.02
UROBILINOGEN, UA POC OHS: 0.2

## 2025-02-24 PROCEDURE — 99999PBSHW POCT BLADDER SCAN: Mod: PBBFAC,,,

## 2025-02-24 PROCEDURE — 99999 PR PBB SHADOW E&M-EST. PATIENT-LVL IV: CPT | Mod: PBBFAC,,, | Performed by: NURSE PRACTITIONER

## 2025-02-24 PROCEDURE — 99214 OFFICE O/P EST MOD 30 MIN: CPT | Mod: PBBFAC,PO | Performed by: NURSE PRACTITIONER

## 2025-02-24 PROCEDURE — 99214 OFFICE O/P EST MOD 30 MIN: CPT | Mod: S$PBB,,, | Performed by: NURSE PRACTITIONER

## 2025-02-24 PROCEDURE — 51798 US URINE CAPACITY MEASURE: CPT | Mod: PBBFAC,PO | Performed by: NURSE PRACTITIONER

## 2025-02-24 PROCEDURE — 81003 URINALYSIS AUTO W/O SCOPE: CPT | Mod: PBBFAC,PO | Performed by: NURSE PRACTITIONER

## 2025-02-24 PROCEDURE — 99999PBSHW POCT URINALYSIS(INSTRUMENT): Mod: PBBFAC,,,

## 2025-02-24 PROCEDURE — G2211 COMPLEX E/M VISIT ADD ON: HCPCS | Mod: S$PBB,,, | Performed by: NURSE PRACTITIONER

## 2025-02-24 RX ORDER — FINASTERIDE 5 MG/1
5 TABLET, FILM COATED ORAL DAILY
Qty: 90 TABLET | Refills: 2 | Status: SHIPPED | OUTPATIENT
Start: 2025-02-24 | End: 2025-05-25

## 2025-02-24 RX ORDER — TAMSULOSIN HYDROCHLORIDE 0.4 MG/1
0.4 CAPSULE ORAL NIGHTLY
Qty: 90 CAPSULE | Refills: 3 | Status: SHIPPED | OUTPATIENT
Start: 2025-02-24 | End: 2025-05-25

## 2025-02-24 NOTE — PROGRESS NOTES
[facility] Clinic Note  Staff: RADHA Guevara-C    PCP: STONE Torres  :  STONE Parra    Chief Complaint: F/UP-BPH with LUTS    Subjective:        HPI: Srinivasa White is a 74 y.o. male who presents to clinic for BPH. He is Established  to our clinic.       2/24/25  S/P TURP on 7/12/24 with DR. Parra due to recurrent GH  Pt overall doing well with no new  complaints voiced.  He denies dysuria and gross hematuria today.  UA today is WNL  PVR is 0 mL  Current  meds:  Finasteride 5 mg po daily and Flomax 0.4 mg po daily and tolerates meds with no problems noted.  No problems with urination at this time.  Good urine flow.    8/26/24  S/P TURP on 7/12/24 for recurrent gross hematuria.   Pathology: 9 g, benign  No further gross hematuria.   Urinary symptoms are stable.   PVR 0 cc     7/20/22  Previous cysto showed enlargement of the prostate with bleeding noted. He was started on Finasteride and has continued Flomax.   CT and cytology were negative.      No further gross hematuria.   Nocturia x 2. Sleeps in a recliner.   No bothersome complaints during the day. He feels as though he empties well.      1/10/22  Patient had episode of gross hematuria approximately 6 months ago for which a IVP was ordered. This showed possible bilateral renal pelvis dilation, however it appears this may be extra renal pelvices and visualization is difficult secondary to his very large stool burden. Kidneys appear to drain well.      He is without complaints today. No recent hematuria. No dysuria.      UA today negative   PVR 0 cc     3/1/21  Previous patient of Dr. Orlando. Underwent laser TURP in 10/2015 and noted improvement in his symptoms following this.   Has some dribbling. Rarely has a weak stream. Feels like he empties well. No significant frequency or urgency. No incontinence.   No hematuria. Mild dysuria with orange juice.   Not on any bladder medications.      Also has hx of diabetes insipida for  which he was being treated with Desmopressin by his nephrology however has stopped this. Since he got COVID in October, he has had to sleep in a recliner and since then has not had as much of an issue with nocturia.      Hx of stones many years ago.      UA today: negative for blood, leuks, nitrite   PVR today: 0 cc     Last urine culture: no documented UTIs  Last creatinine: 0.8 (9/21/18)  Last PSA: 0.69 (1/29/20)     Family  hx: no malignancy   Hx of HTN, obesity     Past medical, family, and social history reviewed as documented in chart with pertinent positive medical, family, and social history detailed in HPI.      REVIEW OF SYSTEMS:  A comprehensive 10 system review was performed and is negative except as noted above in HPI      PMHx:  Past Medical History:   Diagnosis Date    Arthritis     Asthma     Back pain     BPH (benign prostatic hyperplasia)     CHF (congestive heart failure) 2021    Digestive disorder     H/O colonoscopy 01/01/2005    Polyps removed    Hypertension     Thyroid disease        PSHx:  Past Surgical History:   Procedure Laterality Date    APPENDECTOMY  01/01/1974    COLONOSCOPY      CYSTOSCOPY N/A 7/10/2024    Procedure: CYSTOSCOPY;  Surgeon: Lise Parra MD;  Location: [facility] OR;  Service: Urology;  Laterality: N/A;    LASER OF PROSTATE W/ GREEN LIGHT PVP  10/20/2015    left leg Schrap medat  01/01/1970    SKIN BIOPSY      THYROIDECTOMY N/A 5/23/2023    Procedure: THYROIDECTOMY;  Surgeon: Pat High MD;  Location: [facility] OR;  Service: General;  Laterality: N/A;    TONSILLECTOMY  01/01/1958    TRANSURETHRAL RESECTION OF PROSTATE N/A 7/12/2024    Procedure: TURP (TRANSURETHRAL RESECTION OF PROSTATE);  Surgeon: Lise Parra MD;  Location: [facility] OR;  Service: Urology;  Laterality: N/A;  CLOT EVAC    WISDOM TOOTH EXTRACTION  01/01/1964       Allergies:  [id number]    Medications: reviewed     Objective:   There were no vitals filed for this  visit.    General:WDWN in NAD  Eyes: PERRLA, normal conjunctiva  Respiratory: no increased work on breathing, clear to auscultation  Cardiovascular: regular rate and rhythm. No obvious extremity edema.  GI: palpation of masses. No tenderness. No hepatosplenomegaly to palpation.  Musculoskeletal: normal range of motion of bilateral upper extremities. Normal muscle strength and tone.  Skin: no obvious rashes or lesions. No tightening of skin noted.  Neurologic: CN grossly normal. Normal sensation.   Psychiatric: awake, alert and oriented x 3. Mood and affect normal. Cooperative.    Assessment:       1. BPH without obstruction/lower urinary tract symptoms          Plan:       - Continue Flomax and Finasteride   - Doing well with no complaints   - Will let office know in future should gross hematuria return  - Follow up in 6 months with MD tC Peoples, FNP-C  Visit today is associated with current or anticipated ongoing medical care related to this patient's single serious condition/complex condition.

## 2025-06-11 RX ORDER — LEVOTHYROXINE SODIUM 150 UG/1
TABLET ORAL
COMMUNITY
Start: 2024-12-12

## 2025-06-11 RX ORDER — METFORMIN HYDROCHLORIDE 500 MG/1
500 TABLET, EXTENDED RELEASE ORAL
COMMUNITY
Start: 2025-04-16

## 2025-06-12 ENCOUNTER — OFFICE VISIT (OUTPATIENT)
Dept: PULMONOLOGY | Facility: CLINIC | Age: 75
End: 2025-06-12
Payer: MEDICARE

## 2025-06-12 VITALS — BODY MASS INDEX: 42.09 KG/M2 | OXYGEN SATURATION: 94 % | HEART RATE: 62 BPM | WEIGHT: 285 LBS

## 2025-06-12 DIAGNOSIS — R06.02 SHORTNESS OF BREATH: Primary | ICD-10-CM

## 2025-06-12 DIAGNOSIS — G47.33 OBSTRUCTIVE SLEEP APNEA SYNDROME: ICD-10-CM

## 2025-06-12 PROCEDURE — 99213 OFFICE O/P EST LOW 20 MIN: CPT | Mod: S$GLB,,, | Performed by: INTERNAL MEDICINE

## 2025-06-12 NOTE — PROGRESS NOTES
[facility]  Pulmonology  Follow-Up Clinic Visit    Subjective   Reason for Referral:    Srinivasa White is a 74 y.o. male referred by Dr. Zamora for evaluation of shortness of breath.    Chief Complaint   Patient presents with    Follow-up     Follow up shortness of breath ; Laast ov 12/12/2024      10/4/2021:  Mr. Srinivasa White is a 71 year old gentleman with a history of CHF, HTN and prior COVID-19 who was in his usual state of health until several months ago when he developed exertional dyspnea.  3 months ago he was hospitalized with a CHF exacerbation.  His dypnea improved with IV diuretics and he was discharged home on oxygen.  He was referred her by his cardiologist;s NP.  He smoked 2 packs of cigarettes in the 1960's.  He is unaware of any intrinsic pulmonary disease.    He was in his usual state of health until 8 months ago, when he began experiencing dyspnea on exertion.  His symptoms were gradual in onset and stable since.  Symptoms occur while getting dressed.  Associated symptoms include lower extremity edema and orthopnea, but He denies any wheezing, sputum production, or pleurisy.  His symptoms are exacerbated by any exercise and improved with rest, oxygen and diuretics.   His weight has been stable, on diuretics.  His appetite has been unchanged.  The patient is having no constitutional symptoms, denying fever, chills, anorexia, or weight loss..  Work up so far has included CT chest, CXR and Echo.  Prior treatments include diuretics, which has provided some symptom releif.  He is currently prescribed lasix that he takes when he gains weight.  The patient reports adherence to this regimen.    He uses several pillows at night.   He currently is on oxygen at 2 L/min per nasal cannula..  He smoked 2 packs of cigarettes in his life.   He has (difficulty walking 50 yards on flat ground).  He has been hospitalized twice due to his breathing.    11/1/2021:  In pretty well since our last visit  "from a respiratory perspective.  He was seen by his nephrologist who switched him from furosemide to indepamide.  He has been taking this and has noticed that his edema is been better.  He had a 6 minute walk test which she completed without oxygen and did not desaturate.  Overall he is relatively free from respiratory symptoms at this time.  He did recently have a urinary tract infection the presented with some cystitis and flank pain.  He is currently taking ciprofloxacin for a total of 10 days.  His symptoms associated with his urinary tract infection improved over the weekend.    11/1/2022 - Here for follow up, Feels he is doing pretty well overall.  He has weaned himself off of O2.  Prior PFT showed mild restriction and normal DLCO.  He has not been tested for sleep apnea - he does snore but denies observed apneas.  He denies EDS.  He has some chronic nasal congestion.  He had Covid in 2020.      4/27/2023 - Here for follow up, he reports that over the last 3 weeks has noted increased exertional dyspnea and reports drops in sats to the 70's when walking.  Weight is up 13 # since last visit.  He has some cough with thick white secretions and intermittent wheezing.  No f,c,s, or CP.  His home sleep study showed mild STEPHANIE (DAMIEN - 8) and lowest sat 88%.    5/11/2023 - Here for follow up, PFT shows no obstruction, + mild restriction and mild reduction in DLCO but not significantly changed from 10/2021 other than FVC, FEV1 were improved.  Walk test was nonhypoxemic and showed good distance.  He feels the exertional dyspnea is better and wonders if he "had caught something".  No further drops in saturations noted at home.  He was in hospital overnight, CTA was poor quality study with no definite PE to my view.  Dopplers were negative and ECHO was normal with no pulmonary hypertension.    5/11/2023 - PFT (5/4/23)  - No obstruction  - Mild restriction (TLC 69%)  - Mild decreased DLCO  - since 10/2021 FVC and FEV1 are " "better  - nonhypoxemic walk test    6/8/2023 - Here for follow up, overall doing well from a pulmonary standpoint.  Breathing is better and is back to normal.  On 5/23 he had removal of goiter and is recovering.  He has been able to mow his lawn with his wife.    12/7/2023 - Here for follow up, has continued to recover from his thyroid surgery.  He is on thyroid replacement and needs further adjustment.  He does feel "low energy".    We discussed vaccine, he had flu shot, he is up to date on the pneumonia vaccine, he does not want to do the Covid vaccine and we discussed the RSV vaccine.    6/11/2024 - Here for follow up, no new respiratory issues at this time.  He is passing some blood in his urine and is to see a urologist about this.  SOB is about the same.  No problems with sleep at this time.  Has lost a signifcantamount of weight since his last visit (on OZEMPIC)    12/12/2024 - Here for follow, no breathing or respiratory issues reported. His urinary issues have been resolved (due to prostate).  His OZEMPIC was stopped due to GI issues and he has regained about 14# and is working on this.  His wife has been diagnosed with stage 4 appendiceal cancer and she is being treated.  He does not report any sleep issues at this time. He does sleep in a recliner.  There has been a lot of stress in his life right now and we discussed this.  We did discuss the need to care for himself and work on weight loss.  Hi SOb is about the same.    6/12/2025 - Here for follow up, breathing has been stable and no new issues reported, he has not been hospitalized or in the ER and has no new diagnoses.  His wife is dealing with stage IV cancer.  No new sleep issues reported.       Past Medical History:   Diagnosis Date    Arthritis     Asthma     Back pain     BPH (benign prostatic hyperplasia)     CHF (congestive heart failure) 2021    Digestive disorder     H/O colonoscopy 01/01/2005    Polyps removed    Hypertension     Thyroid " disease      Past Surgical History:   Procedure Laterality Date    APPENDECTOMY  01/01/1974    COLONOSCOPY      CYSTOSCOPY N/A 7/10/2024    Procedure: CYSTOSCOPY;  Surgeon: Lise Parra MD;  Location: [facility] OR;  Service: Urology;  Laterality: N/A;    LASER OF PROSTATE W/ GREEN LIGHT PVP  10/20/2015    left leg Schrap medat  01/01/1970    SKIN BIOPSY      THYROIDECTOMY N/A 5/23/2023    Procedure: THYROIDECTOMY;  Surgeon: Pat High MD;  Location: [facility] OR;  Service: General;  Laterality: N/A;    TONSILLECTOMY  01/01/1958    TRANSURETHRAL RESECTION OF PROSTATE N/A 7/12/2024    Procedure: TURP (TRANSURETHRAL RESECTION OF PROSTATE);  Surgeon: Lise Parra MD;  Location: [facility] OR;  Service: Urology;  Laterality: N/A;  CLOT EVAC    WISDOM TOOTH EXTRACTION  01/01/1964     No family history on file.   Social History     Tobacco Use    Smoking status: Former     Types: Cigarettes    Smokeless tobacco: Never   Substance and Sexual Activity    Alcohol use: Yes     Alcohol/week: 0.0 standard drinks of alcohol     Comment: occasional beer or wine monthly    Drug use: No    Sexual activity: Not on file      Allergies:   [id number]     Outpatient Medications as of 6/12/2025   Medication    ACCU-CHEK GUIDE GLUCOSE METER Misc    ACCU-CHEK GUIDE TEST STRIPS Strp    ACCU-CHEK SOFTCLIX LANCETS Misc    acetaminophen (TYLENOL) 500 MG tablet    ALCOHOL PREP PADS PadM    aspirin (ECOTRIN) 81 MG EC tablet    atorvastatin (LIPITOR) 20 MG tablet    b complex vitamins capsule    benzonatate (TESSALON) 100 MG capsule    calcium citrate malate-vit D3 (OSCAL) 250 mg-2.5 mcg (100 unit) Tab    ciclopirox (LOPROX) 0.77 % Crea    ciclopirox (PENLAC) 8 % Soln    COCONUT OIL ORAL    felodipine (PLENDIL) 5 MG 24 hr tablet    fluorouraciL (EFUDEX) 5 % cream    indapamide (LOZOL) 2.5 MG Tab    inulin (FIBER GUMMIES ORAL)    JUBLIA 10 % Preeti    Lactobacillus rhamnosus GG (CULTURELLE) 10 billion cell capsule     levocetirizine (XYZAL) 5 MG tablet    levothyroxine (SYNTHROID) 150 MCG tablet    magnesium citrate 100 mg Cap    metFORMIN (GLUCOPHAGE-XR) 500 MG ER 24hr tablet    olmesartan (BENICAR) 40 MG tablet    spironolactone (ALDACTONE) 25 MG tablet    azithromycin (Z-KAYLEN) 250 MG tablet    finasteride (PROSCAR) 5 mg tablet    glucosamine lew 2KCl-chondroit 500-400 mg Tab    glucosamine-chondroitin 500-400 mg tablet    hyoscyamine (ANASPAZ,LEVSIN) 0.125 mg Tab    magnesium oxide 500 mg Tab    tamsulosin (FLOMAX) 0.4 mg Cap     No current facility-administered medications on file as of 6/12/2025.      Review of Systems   Constitutional:  Positive for weight loss and fatigue. Negative for fever, chills and night sweats.   HENT:  Negative for postnasal drip, rhinorrhea, sinus pressure and congestion.    Eyes:  Negative for redness and itching.   Respiratory:  Positive for cough, shortness of breath, wheezing, orthopnea and dyspnea on extertion.    Cardiovascular:  Positive for leg swelling. Negative for chest pain and palpitations.   Genitourinary:  Negative for difficulty urinating and hematuria.   Endocrine:  Negative for polydipsia, polyphagia and polyuria.    Musculoskeletal:  Negative for gait problem, joint swelling and myalgias.   Skin:  Negative for rash.   Gastrointestinal:  Negative for nausea, vomiting, abdominal pain and acid reflux.   Neurological:  Negative for syncope, weakness and headaches.   Hematological:  Negative for adenopathy. Does not bruise/bleed easily and no excessive bruising.   Psychiatric/Behavioral:  Negative for confusion and sleep disturbance. The patient is not nervous/anxious.    All other systems reviewed and are negative.     Previous Reports Reviewed:   ER records, historical medical records, lab reports, nursing home notes, office notes, operative reports, radiology reports, referral letter/letters and x-ray reports   The following portions of the patient's history were reviewed and updated  as appropriate: allergies, current medications, past family history, past medical history, past social history, past surgical history and problem list.    Objective:      BP (P) 134/80 (BP Location: Left arm, Patient Position: Sitting)   Pulse 62   Wt 129.3 kg (285 lb)   SpO2 (!) 94%   BMI 42.09 kg/m²   Body mass index is 42.09 kg/m².     Physical Exam   Constitutional: He is oriented to person, place, and time. He appears well-developed and well-nourished. No distress.   HENT:   Head: Normocephalic.   Mouth/Throat: Oropharynx is clear and moist. Mallampati Score: III.   Neck: No thyromegaly present.   Cardiovascular: Normal rate, regular rhythm, normal heart sounds and intact distal pulses.   Pulmonary/Chest: Normal expansion, symmetric chest wall expansion and effort normal. He has no wheezes. He has no rhonchi. He has no rales.   Abdominal: Soft. Bowel sounds are normal. He exhibits no distension. There is no abdominal tenderness.   Musculoskeletal:         General: No tenderness, deformity or edema. Normal range of motion.      Cervical back: Normal range of motion and neck supple.   Lymphadenopathy: No supraclavicular adenopathy is present.     He has no cervical adenopathy.     He has no axillary adenopathy.   Neurological: He is alert and oriented to person, place, and time. No cranial nerve deficit.   Skin: Skin is warm and dry. No rash noted.   Psychiatric: He has a normal mood and affect.   Nursing note and vitals reviewed.     Personal Review of Relevant Diagnostic Studies:  I have personally reviewed and interpreted the following labs/studies/images.  6MWT:  10/21/2021:  Distance:  Predicted:  443.2 m  Actual:  426.72 m  SpO2:  Rest:  96% on RA  Exercise:  97% on RA.      PFTs:  10/21/2021:  FVC:  2.37 959%)  FEV1:  1.92 (64%)  FEV1/FVC:  81  T.91 (71%)  DLCO:  16.48 (67%)  My impression.  No obstruction.  Mild restriction.  Mild diffusion impairment.  2023  FVC - 82%  FEV1 - 89%  FEV1/FVC  - 82%  TLC - 69%  DLCO - 65%  Moderate restriction, mild decrease DLCO  Nonhypoxemic walk test    CXR:  10/19/2021:  Blunting of the costophrenic angles, right greater than left are consistent with small pleural effusions    BMP:  7/22/2021:  140 / 3.7 / 104 / 28 / 13 / 0.74 / 134  D-dimer:  <200 (normal)    NT-proBNP:  7/22/2021:  522    CTA chest:  2/19/2021:  My impression:  Small filling defect in a subsegmental artery that looks more like a flow void.  I am unconvinced that it represent a clot.  1.  Small focal peripheral filling defect in a subsegmental artery supplying the lateral segment of the right middle lobe likely reflects a chronic pulmonary embolism.  2.  The thoracic aorta is normal caliber with mild atherosclerosis    Assessment:       1. Shortness of breath    2. Obstructive sleep apnea syndrome                        Plan:     SOB is about the same  Continue to work on weight loss and exercise  HST showed mild disease and we will follow clinically  RTC 6 month          Orders Placed This Visit:  No orders of the defined types were placed in this encounter.          Jorgito Kent MD  [facility] Pulmonary/Critical Care  06/12/2025

## 2025-07-29 DIAGNOSIS — N40.0 BPH WITHOUT OBSTRUCTION/LOWER URINARY TRACT SYMPTOMS: ICD-10-CM

## 2025-07-30 RX ORDER — TAMSULOSIN HYDROCHLORIDE 0.4 MG/1
0.4 CAPSULE ORAL
Qty: 90 CAPSULE | Refills: 3 | Status: SHIPPED | OUTPATIENT
Start: 2025-07-30

## 2025-08-20 ENCOUNTER — TELEPHONE (OUTPATIENT)
Dept: UROLOGY | Facility: CLINIC | Age: 75
End: 2025-08-20
Payer: MEDICARE

## 2025-08-25 ENCOUNTER — OFFICE VISIT (OUTPATIENT)
Dept: UROLOGY | Facility: CLINIC | Age: 75
End: 2025-08-25
Payer: MEDICARE

## 2025-08-25 DIAGNOSIS — R31.0 GROSS HEMATURIA: Primary | ICD-10-CM

## 2025-08-25 DIAGNOSIS — R31.0 GROSS HEMATURIA: ICD-10-CM

## 2025-08-25 DIAGNOSIS — N40.0 BPH WITHOUT OBSTRUCTION/LOWER URINARY TRACT SYMPTOMS: Primary | ICD-10-CM

## 2025-08-25 LAB
BACTERIA #/AREA URNS AUTO: NORMAL /HPF
MICROSCOPIC COMMENT: NORMAL
RBC #/AREA URNS AUTO: 2 /HPF (ref 0–4)
SQUAMOUS #/AREA URNS AUTO: <1 /HPF
WBC #/AREA URNS AUTO: 1 /HPF (ref 0–5)

## 2025-08-25 PROCEDURE — 99213 OFFICE O/P EST LOW 20 MIN: CPT | Mod: PBBFAC,PO | Performed by: STUDENT IN AN ORGANIZED HEALTH CARE EDUCATION/TRAINING PROGRAM

## 2025-08-25 PROCEDURE — 99999 PR PBB SHADOW E&M-EST. PATIENT-LVL III: CPT | Mod: PBBFAC,,, | Performed by: STUDENT IN AN ORGANIZED HEALTH CARE EDUCATION/TRAINING PROGRAM

## 2025-08-25 PROCEDURE — 81001 URINALYSIS AUTO W/SCOPE: CPT | Performed by: STUDENT IN AN ORGANIZED HEALTH CARE EDUCATION/TRAINING PROGRAM

## 2025-08-25 PROCEDURE — G2211 COMPLEX E/M VISIT ADD ON: HCPCS | Mod: ,,, | Performed by: STUDENT IN AN ORGANIZED HEALTH CARE EDUCATION/TRAINING PROGRAM

## 2025-08-25 PROCEDURE — 99214 OFFICE O/P EST MOD 30 MIN: CPT | Mod: S$PBB,,, | Performed by: STUDENT IN AN ORGANIZED HEALTH CARE EDUCATION/TRAINING PROGRAM

## 2025-08-25 PROCEDURE — 87086 URINE CULTURE/COLONY COUNT: CPT | Performed by: STUDENT IN AN ORGANIZED HEALTH CARE EDUCATION/TRAINING PROGRAM

## 2025-08-25 PROCEDURE — 88112 CYTOPATH CELL ENHANCE TECH: CPT | Mod: TC | Performed by: STUDENT IN AN ORGANIZED HEALTH CARE EDUCATION/TRAINING PROGRAM

## 2025-08-25 RX ORDER — FINASTERIDE 5 MG/1
5 TABLET, FILM COATED ORAL DAILY
Qty: 90 TABLET | Refills: 3 | Status: SHIPPED | OUTPATIENT
Start: 2025-08-25 | End: 2026-08-25

## 2025-08-26 LAB — BACTERIA UR CULT: NO GROWTH

## 2025-08-27 LAB
ESTROGEN SERPL-MCNC: NORMAL PG/ML
INSULIN SERPL-ACNC: NORMAL U[IU]/ML
LAB AP GROSS DESCRIPTION: NORMAL
LAB AP PERFORMING LOCATION(S): NORMAL
LAB AP URINE CYTOLOGY INTERPRETATION SPECIMEN 1: NORMAL

## 2025-09-04 ENCOUNTER — TELEPHONE (OUTPATIENT)
Dept: UROLOGY | Facility: CLINIC | Age: 75
End: 2025-09-04
Payer: MEDICARE

## (undated) DEVICE — STAPLER SKIN NON ROTATE PXW35

## (undated) DEVICE — Device

## (undated) DEVICE — SYRINGE HYPODERMC LL 22G 1.5 ECLIPSE 30

## (undated) DEVICE — SOL NACL IRR 1000ML BTL

## (undated) DEVICE — ELECTRODE RESECTION LOOP LRGE

## (undated) DEVICE — DRAIN FLAT SILICONE 7MM 70360

## (undated) DEVICE — POUCH INSTRUMENT 1018

## (undated) DEVICE — COVER TABLE 44X90 STERILE

## (undated) DEVICE — SOLUTION IRRI NS BOTTLE 1000ML R5200-01

## (undated) DEVICE — JELLY SURGILUBE LUBE TUBE 2OZ

## (undated) DEVICE — SUTURE VICRYL 4-0 SH 27 J415H

## (undated) DEVICE — HEMOSTAT FIBRILLAR 2X4 1962

## (undated) DEVICE — SOL NACL IRR 3000ML

## (undated) DEVICE — TIP BOVIE ENT 2.75      E1455

## (undated) DEVICE — CONNECTOR SUCTION FRAZIER 0031000

## (undated) DEVICE — TUBING ARTHRO IRR 4-LEAD

## (undated) DEVICE — SYR 50CC LL

## (undated) DEVICE — GLOVE SENSICARE PI ALOE 6.5

## (undated) DEVICE — CONTAINER SPECIMEN OR STER 4OZ

## (undated) DEVICE — SEE ITEM #152308

## (undated) DEVICE — DRAPE THYROID 89255

## (undated) DEVICE — LEGGING CLEAR POLY 2/PACK

## (undated) DEVICE — BAG DRAIN ANTI REFLUX 2000ML

## (undated) DEVICE — SET CYSTO IRRIGATION UNIV SPIK

## (undated) DEVICE — SPONGE XRAY DETECTABLE 4X4

## (undated) DEVICE — TIP BOVIE 4 0014A

## (undated) DEVICE — SUTURE VICRYL 4-0 18 VCP773D

## (undated) DEVICE — SPONGE PEANUT 3/8 7103

## (undated) DEVICE — DRAPE CYSTOSCOPY LARGE

## (undated) DEVICE — SLEEVE SCD EXPRESS KNEE MEDIUM

## (undated) DEVICE — SUTURE VICRYL 2-0 SH 18 VCP775D

## (undated) DEVICE — GLOVE SURGEONS ULTRA TOUCH 6.5

## (undated) DEVICE — DISSECTOR EXACT LIGASURE 20.6MM-21CM

## (undated) DEVICE — SYR 30CC LUER LOCK

## (undated) DEVICE — SUTURE CHROMIC 3-0 SH 27 G122H

## (undated) DEVICE — BAG LINGEMAN DRAIN UROLOGY

## (undated) DEVICE — SCRUB DYNA-HEX LIQ 4% CHG 4OZ

## (undated) DEVICE — ADHESIVE TISSUE EXOFIN

## (undated) DEVICE — SPONGE LAP 18X18

## (undated) DEVICE — GOWN POLY REINF BRTH SLV LG

## (undated) DEVICE — CYLINDER 1000 ML GRADUATED

## (undated) DEVICE — SYRINGE BULB ASEPTO 60CC 2OZ

## (undated) DEVICE — BLADE ELECTRODE EDGE INSULATED 4  INCH

## (undated) DEVICE — LUBRICANT SURGILUBE 2 OZ

## (undated) DEVICE — SPONGE BULKEE II ABSRB 6X6.75

## (undated) DEVICE — PAD BOVIE ADULT

## (undated) DEVICE — URINAL MALE WITH LID DISP

## (undated) DEVICE — BOWL STERILE LARGE 32OZ

## (undated) DEVICE — SYR 10CC LUER LOCK

## (undated) DEVICE — SET CYSTO IRR DRP CHMBR 84IN

## (undated) DEVICE — SPONGE GAUZE 10S 4X4  442214

## (undated) DEVICE — BULB DRAIN 100CC 70740

## (undated) DEVICE — SUTURE SILK 3-0 SH 18 C013D

## (undated) DEVICE — SYR 50ML CATH TIP

## (undated) DEVICE — SPONGE DRAIN 4X4 441407

## (undated) DEVICE — GLOVE BIOGEL MICRO SURGEON PINK SZ 7

## (undated) DEVICE — DRESSING TEGADERM 4X4 3/4 TD1004